# Patient Record
Sex: FEMALE | Race: OTHER | NOT HISPANIC OR LATINO | Employment: FULL TIME | URBAN - METROPOLITAN AREA
[De-identification: names, ages, dates, MRNs, and addresses within clinical notes are randomized per-mention and may not be internally consistent; named-entity substitution may affect disease eponyms.]

---

## 2023-03-03 LAB
ALLERGEN FOOD: ALMOND (AMYGDALUS COMMUNIS) IGE (KU/L): <0.1 KU/L
ALLERGEN FOOD: CASHEW NUT (ANACARDIUM OCCIDENTALE) IGE (KU/L): <0.1 KU/L
ALLERGEN FOOD: HAZELNUT IGE: 1.74 KU/L
ALLERGEN FOOD: PEANUT (ARACHIS HYPOGAEA) IGE (KU/L): <0.1 KU/L
ALLERGEN FOOD: PECAN NUT (CARYA ILLINOENSIS) IGE (KU/L): <0.1 KU/L
ALLERGEN FOOD: PISTACHIO (PISTACIA VERA) IGE (KU/L): <0.1 KU/L
ALLERGEN FOOD: WALNUT (JUGLANS SPP.) IGE (KU/L): <0.1 KU/L
IMMUNOCAP INTERPRETATION: NORMAL

## 2023-03-08 LAB
CLASS ARA H1, VIRC: 0
CLASS ARA H2, VIRC: 0
CLASS ARA H3, VIRC: 0
CLASS ARA H8, VIRC: 2
CLASS ARA H9, VIRC: 0
CLASS HAZELNUT RCORA1, VIRC: 2
CLASS HAZELNUT RCORA14, VIRC: 0
CLASS HAZELNUT RCORA8, VIRC: 0
CLASS HAZELNUT RCORA9, VIRC: 0
HAZELNUT COMP. RCORA1, VIRC: 2.62 KU/L
HAZELNUT COMP. RCORA14, VIRC: <0.1 KU/L
HAZELNUT COMP. RCORA8, VIRC: <0.1 KU/L
HAZELNUT COMP. RCORA9, VIRC: <0.1 KU/L
PEANUT COMP. ARA H1, VIRC: <0.1 KU/L
PEANUT COMP. ARA H2, VIRC: <0.1 KU/L
PEANUT COMP. ARA H3, VIRC: <0.1 KU/L
PEANUT COMP. ARA H8, VIRC: 0.97 KU/L
PEANUT COMP. ARA H9, VIRC: <0.1 KU/L

## 2023-03-17 LAB
ALANINE AMINOTRANSFERASE (SGPT) (U/L) IN SER/PLAS: 17 U/L (ref 7–45)
ALBUMIN (G/DL) IN SER/PLAS: 4.3 G/DL (ref 3.4–5)
ALKALINE PHOSPHATASE (U/L) IN SER/PLAS: 56 U/L (ref 33–110)
ANION GAP IN SER/PLAS: 13 MMOL/L (ref 10–20)
ASPARTATE AMINOTRANSFERASE (SGOT) (U/L) IN SER/PLAS: 15 U/L (ref 9–39)
BILIRUBIN TOTAL (MG/DL) IN SER/PLAS: 0.4 MG/DL (ref 0–1.2)
CALCIUM (MG/DL) IN SER/PLAS: 9.8 MG/DL (ref 8.6–10.6)
CARBON DIOXIDE, TOTAL (MMOL/L) IN SER/PLAS: 28 MMOL/L (ref 21–32)
CHLORIDE (MMOL/L) IN SER/PLAS: 102 MMOL/L (ref 98–107)
CREATININE (MG/DL) IN SER/PLAS: 0.67 MG/DL (ref 0.5–1.05)
ERYTHROCYTE DISTRIBUTION WIDTH (RATIO) BY AUTOMATED COUNT: 14.9 % (ref 11.5–14.5)
ERYTHROCYTE MEAN CORPUSCULAR HEMOGLOBIN CONCENTRATION (G/DL) BY AUTOMATED: 31.8 G/DL (ref 32–36)
ERYTHROCYTE MEAN CORPUSCULAR VOLUME (FL) BY AUTOMATED COUNT: 86 FL (ref 80–100)
ERYTHROCYTES (10*6/UL) IN BLOOD BY AUTOMATED COUNT: 4.73 X10E12/L (ref 4–5.2)
FERRITIN (UG/LL) IN SER/PLAS: 27 UG/L (ref 8–150)
GFR FEMALE: >90 ML/MIN/1.73M2
GLUCOSE (MG/DL) IN SER/PLAS: 107 MG/DL (ref 74–99)
HEMATOCRIT (%) IN BLOOD BY AUTOMATED COUNT: 40.6 % (ref 36–46)
HEMOGLOBIN (G/DL) IN BLOOD: 12.9 G/DL (ref 12–16)
IRON (UG/DL) IN SER/PLAS: 57 UG/DL (ref 35–150)
IRON BINDING CAPACITY (UG/DL) IN SER/PLAS: 385 UG/DL (ref 240–445)
IRON SATURATION (%) IN SER/PLAS: 15 % (ref 25–45)
LEUKOCYTES (10*3/UL) IN BLOOD BY AUTOMATED COUNT: 7.2 X10E9/L (ref 4.4–11.3)
LIPASE (U/L) IN SER/PLAS: 31 U/L (ref 9–82)
NRBC (PER 100 WBCS) BY AUTOMATED COUNT: 0 /100 WBC (ref 0–0)
PLATELETS (10*3/UL) IN BLOOD AUTOMATED COUNT: 341 X10E9/L (ref 150–450)
POTASSIUM (MMOL/L) IN SER/PLAS: 4.2 MMOL/L (ref 3.5–5.3)
PROTEIN TOTAL: 7.1 G/DL (ref 6.4–8.2)
SODIUM (MMOL/L) IN SER/PLAS: 139 MMOL/L (ref 136–145)
UREA NITROGEN (MG/DL) IN SER/PLAS: 13 MG/DL (ref 6–23)

## 2023-03-22 PROBLEM — R06.00 DYSPNEA: Status: ACTIVE | Noted: 2023-03-22

## 2023-03-22 PROBLEM — R00.2 PALPITATIONS: Status: ACTIVE | Noted: 2023-03-22

## 2023-03-22 PROBLEM — G47.10 HYPERSOMNOLENCE: Status: ACTIVE | Noted: 2023-03-22

## 2023-03-22 PROBLEM — K58.9 IBS (IRRITABLE BOWEL SYNDROME): Status: ACTIVE | Noted: 2023-03-22

## 2023-03-22 PROBLEM — I82.532 CHRONIC DEEP VEIN THROMBOSIS (DVT) OF POPLITEAL VEIN OF LEFT LOWER EXTREMITY (MULTI): Status: ACTIVE | Noted: 2023-03-22

## 2023-03-22 PROBLEM — Z86.718 HISTORY OF DVT (DEEP VEIN THROMBOSIS): Status: ACTIVE | Noted: 2023-03-22

## 2023-03-22 PROBLEM — I10 HYPERTENSION: Status: ACTIVE | Noted: 2023-03-22

## 2023-03-22 PROBLEM — N73.9 PID (PELVIC INFLAMMATORY DISEASE): Status: ACTIVE | Noted: 2023-03-22

## 2023-03-22 PROBLEM — N94.3 PMS (PREMENSTRUAL SYNDROME): Status: ACTIVE | Noted: 2023-03-22

## 2023-03-22 PROBLEM — E11.9: Status: ACTIVE | Noted: 2023-03-22

## 2023-03-22 PROBLEM — M54.9 UPPER BACK PAIN ON RIGHT SIDE: Status: ACTIVE | Noted: 2023-03-22

## 2023-03-22 PROBLEM — R10.2 PELVIC PAIN IN FEMALE: Status: ACTIVE | Noted: 2023-03-22

## 2023-03-22 PROBLEM — F43.12 NIGHTMARES ASSOCIATED WITH CHRONIC POST-TRAUMATIC STRESS DISORDER: Status: ACTIVE | Noted: 2023-03-22

## 2023-03-22 PROBLEM — E03.9 HYPOTHYROIDISM: Status: ACTIVE | Noted: 2023-03-22

## 2023-03-22 PROBLEM — R79.89 LOW VITAMIN D LEVEL: Status: ACTIVE | Noted: 2023-03-22

## 2023-03-22 PROBLEM — R53.83 FATIGUE: Status: ACTIVE | Noted: 2023-03-22

## 2023-03-22 PROBLEM — F51.5 NIGHTMARES ASSOCIATED WITH CHRONIC POST-TRAUMATIC STRESS DISORDER: Status: ACTIVE | Noted: 2023-03-22

## 2023-03-22 PROBLEM — J30.1 SEASONAL ALLERGIC RHINITIS DUE TO POLLEN: Status: ACTIVE | Noted: 2023-03-22

## 2023-03-22 PROBLEM — R53.81 MALAISE AND FATIGUE: Status: ACTIVE | Noted: 2023-03-22

## 2023-03-22 PROBLEM — E55.9 VITAMIN D INSUFFICIENCY: Status: ACTIVE | Noted: 2023-03-22

## 2023-03-22 PROBLEM — N20.0 NEPHROLITHIASIS: Status: ACTIVE | Noted: 2023-03-22

## 2023-03-22 PROBLEM — F32.A ANXIETY AND DEPRESSION: Status: ACTIVE | Noted: 2023-03-22

## 2023-03-22 PROBLEM — Z91.010 PEANUT ALLERGY: Status: ACTIVE | Noted: 2023-03-22

## 2023-03-22 PROBLEM — N81.9 PROLAPSE OF FEMALE PELVIC ORGANS: Status: ACTIVE | Noted: 2023-03-22

## 2023-03-22 PROBLEM — F41.9 ANXIETY AND DEPRESSION: Status: ACTIVE | Noted: 2023-03-22

## 2023-03-22 PROBLEM — N92.6 IRREGULAR PERIODS: Status: ACTIVE | Noted: 2023-03-22

## 2023-03-22 PROBLEM — D50.9 IRON DEFICIENCY ANEMIA: Status: ACTIVE | Noted: 2023-03-22

## 2023-03-22 PROBLEM — E78.5 DYSLIPIDEMIA: Status: ACTIVE | Noted: 2023-03-22

## 2023-03-22 PROBLEM — R51.9 FACIAL PAIN: Status: ACTIVE | Noted: 2023-03-22

## 2023-03-22 PROBLEM — J30.81 ALLERGIC RHINITIS DUE TO ANIMAL HAIR AND DANDER: Status: ACTIVE | Noted: 2023-03-22

## 2023-03-22 PROBLEM — M79.605 LEFT LEG PAIN: Status: ACTIVE | Noted: 2023-03-22

## 2023-03-22 PROBLEM — R31.29 OTHER MICROSCOPIC HEMATURIA: Status: ACTIVE | Noted: 2023-03-22

## 2023-03-22 PROBLEM — E11.9 TYPE 2 DIABETES MELLITUS WITHOUT COMPLICATIONS (MULTI): Status: ACTIVE | Noted: 2023-03-22

## 2023-03-22 PROBLEM — R06.02 SHORTNESS OF BREATH: Status: ACTIVE | Noted: 2023-03-22

## 2023-03-22 PROBLEM — N83.02 FOLLICULAR CYST OF LEFT OVARY: Status: ACTIVE | Noted: 2023-03-22

## 2023-03-22 PROBLEM — N93.9 ABNORMAL UTERINE BLEEDING (AUB): Status: ACTIVE | Noted: 2023-03-22

## 2023-03-22 PROBLEM — I26.99 PULMONARY EMBOLUS (MULTI): Status: ACTIVE | Noted: 2023-03-22

## 2023-03-22 PROBLEM — J30.9 ALLERGIC RHINITIS: Status: ACTIVE | Noted: 2023-03-22

## 2023-03-22 PROBLEM — Z91.018 TREE NUT ALLERGY: Status: ACTIVE | Noted: 2023-03-22

## 2023-03-22 PROBLEM — R44.8 FACIAL PRESSURE: Status: ACTIVE | Noted: 2023-03-22

## 2023-03-22 PROBLEM — N92.0 MENORRHAGIA WITH REGULAR CYCLE: Status: ACTIVE | Noted: 2023-03-22

## 2023-03-22 PROBLEM — R07.89 ATYPICAL CHEST PAIN: Status: ACTIVE | Noted: 2023-03-22

## 2023-03-22 PROBLEM — M62.89 PELVIC FLOOR DYSFUNCTION: Status: ACTIVE | Noted: 2023-03-22

## 2023-03-22 PROBLEM — R52 PAIN OF RIGHT SIDE OF BODY: Status: ACTIVE | Noted: 2023-03-22

## 2023-03-22 PROBLEM — R10.9 ABDOMINAL PAIN: Status: ACTIVE | Noted: 2023-03-22

## 2023-03-22 PROBLEM — G43.809 VESTIBULAR MIGRAINE: Status: ACTIVE | Noted: 2023-03-22

## 2023-03-22 PROBLEM — R53.83 MALAISE AND FATIGUE: Status: ACTIVE | Noted: 2023-03-22

## 2023-03-22 RX ORDER — GUAIFENESIN 1200 MG
650 TABLET, EXTENDED RELEASE 12 HR ORAL 3 TIMES DAILY PRN
COMMUNITY
Start: 2020-11-20 | End: 2023-11-06 | Stop reason: ALTCHOICE

## 2023-03-22 RX ORDER — AZELASTINE 1 MG/ML
2 SPRAY, METERED NASAL 2 TIMES DAILY
COMMUNITY
Start: 2023-03-15 | End: 2023-03-23 | Stop reason: ALTCHOICE

## 2023-03-22 RX ORDER — FLUTICASONE PROPIONATE 50 MCG
2 SPRAY, SUSPENSION (ML) NASAL DAILY
COMMUNITY
Start: 2023-03-02 | End: 2023-03-23 | Stop reason: ALTCHOICE

## 2023-03-22 RX ORDER — CHOLECALCIFEROL (VITAMIN D3) 50 MCG
1 TABLET ORAL DAILY
COMMUNITY
Start: 2020-11-30 | End: 2023-10-13

## 2023-03-22 RX ORDER — LEVOTHYROXINE SODIUM 112 UG/1
1 TABLET ORAL DAILY
COMMUNITY
Start: 2015-07-07

## 2023-03-22 RX ORDER — EPINEPHRINE 0.3 MG/.3ML
1 INJECTION SUBCUTANEOUS ONCE
COMMUNITY
Start: 2023-03-02

## 2023-03-23 ENCOUNTER — OFFICE VISIT (OUTPATIENT)
Dept: PRIMARY CARE | Facility: CLINIC | Age: 43
End: 2023-03-23
Payer: COMMERCIAL

## 2023-03-23 VITALS
TEMPERATURE: 95.4 F | DIASTOLIC BLOOD PRESSURE: 90 MMHG | WEIGHT: 195 LBS | SYSTOLIC BLOOD PRESSURE: 130 MMHG | BODY MASS INDEX: 34.55 KG/M2 | HEIGHT: 63 IN

## 2023-03-23 DIAGNOSIS — S06.0X0D CONCUSSION WITHOUT LOSS OF CONSCIOUSNESS, SUBSEQUENT ENCOUNTER: ICD-10-CM

## 2023-03-23 DIAGNOSIS — E66.9 OBESITY (BMI 30.0-34.9): Primary | ICD-10-CM

## 2023-03-23 DIAGNOSIS — E11.9 TYPE 2 DIABETES MELLITUS IN REMISSION (MULTI): ICD-10-CM

## 2023-03-23 PROBLEM — R10.9 ABDOMINAL PAIN: Status: RESOLVED | Noted: 2023-03-22 | Resolved: 2023-03-23

## 2023-03-23 PROBLEM — E66.811 OBESITY (BMI 30.0-34.9): Status: ACTIVE | Noted: 2023-03-23

## 2023-03-23 PROBLEM — R51.9 FACIAL PAIN: Status: RESOLVED | Noted: 2023-03-22 | Resolved: 2023-03-23

## 2023-03-23 PROCEDURE — 3080F DIAST BP >= 90 MM HG: CPT

## 2023-03-23 PROCEDURE — 3075F SYST BP GE 130 - 139MM HG: CPT

## 2023-03-23 PROCEDURE — 1036F TOBACCO NON-USER: CPT

## 2023-03-23 PROCEDURE — 99213 OFFICE O/P EST LOW 20 MIN: CPT

## 2023-03-23 RX ORDER — MINERAL OIL
180 ENEMA (ML) RECTAL DAILY
COMMUNITY
Start: 2023-03-02 | End: 2023-03-23 | Stop reason: ALTCHOICE

## 2023-03-23 RX ORDER — FERROUS GLUCONATE 240(27)MG
27 TABLET ORAL
COMMUNITY
Start: 2023-01-04 | End: 2023-10-04 | Stop reason: ALTCHOICE

## 2023-03-23 RX ORDER — ALBUTEROL SULFATE 90 UG/1
2 AEROSOL, METERED RESPIRATORY (INHALATION) EVERY 4 HOURS PRN
COMMUNITY
Start: 2022-07-26 | End: 2023-03-23 | Stop reason: ALTCHOICE

## 2023-03-23 RX ORDER — FLUOXETINE HYDROCHLORIDE 20 MG/1
20 CAPSULE ORAL DAILY
COMMUNITY
Start: 2023-01-18 | End: 2023-03-23 | Stop reason: ALTCHOICE

## 2023-03-23 RX ORDER — GABAPENTIN 100 MG/1
100 CAPSULE ORAL NIGHTLY
COMMUNITY
Start: 2023-02-17 | End: 2023-03-23
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
## 2023-03-24 ENCOUNTER — APPOINTMENT (OUTPATIENT)
Dept: PRIMARY CARE | Facility: CLINIC | Age: 43
End: 2023-03-24
Payer: COMMERCIAL

## 2023-05-15 LAB
CLUE CELLS: NORMAL
NUGENT SCORE: 1
YEAST: NORMAL

## 2023-05-16 ENCOUNTER — OFFICE VISIT (OUTPATIENT)
Dept: PRIMARY CARE | Facility: CLINIC | Age: 43
End: 2023-05-16
Payer: COMMERCIAL

## 2023-05-16 VITALS
BODY MASS INDEX: 34.91 KG/M2 | DIASTOLIC BLOOD PRESSURE: 80 MMHG | SYSTOLIC BLOOD PRESSURE: 130 MMHG | HEIGHT: 63 IN | WEIGHT: 197 LBS

## 2023-05-16 DIAGNOSIS — M77.52 LEFT ANKLE TENDINITIS: Primary | ICD-10-CM

## 2023-05-16 PROBLEM — J30.1 SEASONAL ALLERGIC RHINITIS DUE TO POLLEN: Status: RESOLVED | Noted: 2023-03-22 | Resolved: 2023-05-16

## 2023-05-16 PROBLEM — R44.8 FACIAL PRESSURE: Status: RESOLVED | Noted: 2023-03-22 | Resolved: 2023-05-16

## 2023-05-16 PROBLEM — R10.2 PELVIC PAIN IN FEMALE: Status: RESOLVED | Noted: 2023-03-22 | Resolved: 2023-05-16

## 2023-05-16 PROBLEM — R31.29 OTHER MICROSCOPIC HEMATURIA: Status: RESOLVED | Noted: 2023-03-22 | Resolved: 2023-05-16

## 2023-05-16 PROBLEM — R53.83 FATIGUE: Status: RESOLVED | Noted: 2023-03-22 | Resolved: 2023-05-16

## 2023-05-16 PROBLEM — M79.605 LEFT LEG PAIN: Status: RESOLVED | Noted: 2023-03-22 | Resolved: 2023-05-16

## 2023-05-16 PROBLEM — N20.0 NEPHROLITHIASIS: Status: RESOLVED | Noted: 2023-03-22 | Resolved: 2023-05-16

## 2023-05-16 PROBLEM — N94.3 PMS (PREMENSTRUAL SYNDROME): Status: RESOLVED | Noted: 2023-03-22 | Resolved: 2023-05-16

## 2023-05-16 PROBLEM — R52 PAIN OF RIGHT SIDE OF BODY: Status: RESOLVED | Noted: 2023-03-22 | Resolved: 2023-05-16

## 2023-05-16 PROBLEM — N81.9 PROLAPSE OF FEMALE PELVIC ORGANS: Status: RESOLVED | Noted: 2023-03-22 | Resolved: 2023-05-16

## 2023-05-16 PROBLEM — M62.89 PELVIC FLOOR DYSFUNCTION: Status: RESOLVED | Noted: 2023-03-22 | Resolved: 2023-05-16

## 2023-05-16 PROBLEM — R06.00 DYSPNEA: Status: RESOLVED | Noted: 2023-03-22 | Resolved: 2023-05-16

## 2023-05-16 PROBLEM — I26.99 PULMONARY EMBOLUS (MULTI): Status: RESOLVED | Noted: 2023-03-22 | Resolved: 2023-05-16

## 2023-05-16 PROBLEM — N93.9 ABNORMAL UTERINE BLEEDING (AUB): Status: RESOLVED | Noted: 2023-03-22 | Resolved: 2023-05-16

## 2023-05-16 PROBLEM — R00.2 PALPITATIONS: Status: RESOLVED | Noted: 2023-03-22 | Resolved: 2023-05-16

## 2023-05-16 PROBLEM — R53.83 MALAISE AND FATIGUE: Status: RESOLVED | Noted: 2023-03-22 | Resolved: 2023-05-16

## 2023-05-16 PROBLEM — N92.0 MENORRHAGIA WITH REGULAR CYCLE: Status: RESOLVED | Noted: 2023-03-22 | Resolved: 2023-05-16

## 2023-05-16 PROBLEM — M54.9 UPPER BACK PAIN ON RIGHT SIDE: Status: RESOLVED | Noted: 2023-03-22 | Resolved: 2023-05-16

## 2023-05-16 PROBLEM — R53.81 MALAISE AND FATIGUE: Status: RESOLVED | Noted: 2023-03-22 | Resolved: 2023-05-16

## 2023-05-16 PROBLEM — J30.81 ALLERGIC RHINITIS DUE TO ANIMAL HAIR AND DANDER: Status: RESOLVED | Noted: 2023-03-22 | Resolved: 2023-05-16

## 2023-05-16 PROBLEM — Z91.010 PEANUT ALLERGY: Status: RESOLVED | Noted: 2023-03-22 | Resolved: 2023-05-16

## 2023-05-16 PROBLEM — R06.02 SHORTNESS OF BREATH: Status: RESOLVED | Noted: 2023-03-22 | Resolved: 2023-05-16

## 2023-05-16 PROBLEM — N92.6 IRREGULAR PERIODS: Status: RESOLVED | Noted: 2023-03-22 | Resolved: 2023-05-16

## 2023-05-16 PROBLEM — N73.9 PID (PELVIC INFLAMMATORY DISEASE): Status: RESOLVED | Noted: 2023-03-22 | Resolved: 2023-05-16

## 2023-05-16 PROBLEM — R07.89 ATYPICAL CHEST PAIN: Status: RESOLVED | Noted: 2023-03-22 | Resolved: 2023-05-16

## 2023-05-16 LAB — URINE CULTURE: NO GROWTH

## 2023-05-16 PROCEDURE — 3079F DIAST BP 80-89 MM HG: CPT

## 2023-05-16 PROCEDURE — 3075F SYST BP GE 130 - 139MM HG: CPT

## 2023-05-16 PROCEDURE — 99213 OFFICE O/P EST LOW 20 MIN: CPT

## 2023-05-16 PROCEDURE — 1036F TOBACCO NON-USER: CPT

## 2023-05-16 RX ORDER — NAPROXEN 500 MG/1
500 TABLET ORAL 2 TIMES DAILY PRN
Qty: 30 TABLET | Refills: 0 | Status: SHIPPED | OUTPATIENT
Start: 2023-05-16 | End: 2023-08-14

## 2023-05-16 RX ORDER — PAROXETINE 10 MG/1
10 TABLET, FILM COATED ORAL EVERY MORNING
COMMUNITY
Start: 2023-05-15 | End: 2023-08-23

## 2023-05-16 ASSESSMENT — PATIENT HEALTH QUESTIONNAIRE - PHQ9
1. LITTLE INTEREST OR PLEASURE IN DOING THINGS: SEVERAL DAYS
2. FEELING DOWN, DEPRESSED OR HOPELESS: SEVERAL DAYS
SUM OF ALL RESPONSES TO PHQ9 QUESTIONS 1 & 2: 2
1. LITTLE INTEREST OR PLEASURE IN DOING THINGS: SEVERAL DAYS
2. FEELING DOWN, DEPRESSED OR HOPELESS: SEVERAL DAYS
SUM OF ALL RESPONSES TO PHQ9 QUESTIONS 1 & 2: 2

## 2023-05-16 ASSESSMENT — LIFESTYLE VARIABLES
SKIP TO QUESTIONS 9-10: 1
HOW OFTEN DO YOU HAVE A DRINK CONTAINING ALCOHOL: NEVER
HOW MANY STANDARD DRINKS CONTAINING ALCOHOL DO YOU HAVE ON A TYPICAL DAY: PATIENT DOES NOT DRINK
HOW OFTEN DO YOU HAVE SIX OR MORE DRINKS ON ONE OCCASION: NEVER
AUDIT-C TOTAL SCORE: 0

## 2023-05-16 NOTE — PROGRESS NOTES
"Subjective   Patient ID: Tsering Carranza is a 42 y.o. female who presents for L anlkle pain.  HPI  40 yo female Pmhx prior DVT, Hypothyroidism (sees endo), COVID x 2, Vestibular migraine, Pre-DM/DM, Hypothyroidism, Vitamin D Deficiency and Iron Deficiency, Nephrolithiasis presents for ankle pain and cramping.     Left ankle pain x1 week. Initially started hurting, then improved, then worsened. No known injury. Did start walking for exercise just prior to pain starting, has been walking in shoes that she feels are old and not as supportive as they could be. Describes it as a 3/10 constant aching on the medial aspect of ankle. Worse with sitting and resting. No warmth, swelling, or erythema. Does have pain to palpation over tibialis anterior.   Has not taken any medication for it.     Hypothyroidism: 112mcg levothyroxine  MERYL: Iron supplement daily    All systems have been reviewed and are negative for complaint other than those mentioned in the HPI.     Objective   /80 (BP Location: Left arm, Patient Position: Sitting, BP Cuff Size: Large adult)   Ht 1.6 m (5' 3\")   Wt 89.4 kg (197 lb)   BMI 34.90 kg/m²    Physical Exam  Constitutional:       General: She is awake.      Appearance: Normal appearance.   HENT:      Head: Normocephalic and atraumatic.   Eyes:      Extraocular Movements: Extraocular movements intact.      Pupils: Pupils are equal, round, and reactive to light.   Cardiovascular:      Rate and Rhythm: Normal rate and regular rhythm.      Heart sounds: S1 normal and S2 normal. No murmur heard.  Pulmonary:      Effort: Pulmonary effort is normal.      Breath sounds: Normal breath sounds.   Musculoskeletal:      Cervical back: Normal range of motion and neck supple.      Right lower leg: No edema.      Left lower leg: No edema.      Right ankle: No swelling, deformity or ecchymosis. No tenderness. Normal range of motion. Normal pulse.      Left ankle: No swelling, deformity or ecchymosis. " Tenderness present over the posterior TF ligament. Normal range of motion. Normal pulse.      Right foot: Normal.      Left foot: Normal.   Skin:     General: Skin is warm and dry.   Neurological:      General: No focal deficit present.      Mental Status: She is alert and oriented to person, place, and time.   Psychiatric:         Mood and Affect: Mood and affect normal.         Behavior: Behavior normal. Behavior is cooperative.         Thought Content: Thought content normal.         Judgment: Judgment normal.     Tsering was seen today for l anlkle pain.  Diagnoses and all orders for this visit:  Left ankle tendinitis (Primary)  -     Patient recently began walking for exercise in shoes that she states are old and not supporting  - Tender to palpation over posterior TFL, likely tendonitis  - Rest, NSAID, compression, ice recommended to patient. Recommend using supportive shoes when she resumes exercise and starting an exercise routine slowly.   - naproxen (Naprosyn) 500 mg tablet; Take 1 tablet (500 mg) by mouth 2 times a day as needed for mild pain (1 - 3) (pain).  - Patient does have remote history of provoked DVT (post surgery and while pregnant), no longer on anticoagulation, but pulses are intact, no discoloration, no swelling or temperature changes. No recent long periods of immobility or prolonged sitting.     Follow up in 2 weeks if symptoms are not improved.

## 2023-07-03 LAB
ALANINE AMINOTRANSFERASE (SGPT) (U/L) IN SER/PLAS: 15 U/L (ref 7–45)
ALBUMIN (G/DL) IN SER/PLAS: 4.2 G/DL (ref 3.4–5)
ALBUMIN (MG/L) IN URINE: <7 MG/L
ALBUMIN/CREATININE (UG/MG) IN URINE: NORMAL UG/MG CRT (ref 0–30)
ALKALINE PHOSPHATASE (U/L) IN SER/PLAS: 60 U/L (ref 33–110)
ANION GAP IN SER/PLAS: 10 MMOL/L (ref 10–20)
ASPARTATE AMINOTRANSFERASE (SGOT) (U/L) IN SER/PLAS: 14 U/L (ref 9–39)
BASOPHILS (10*3/UL) IN BLOOD BY AUTOMATED COUNT: 0.05 X10E9/L (ref 0–0.1)
BASOPHILS/100 LEUKOCYTES IN BLOOD BY AUTOMATED COUNT: 0.8 % (ref 0–2)
BILIRUBIN TOTAL (MG/DL) IN SER/PLAS: 0.3 MG/DL (ref 0–1.2)
CALCIDIOL (25 OH VITAMIN D3) (NG/ML) IN SER/PLAS: 26 NG/ML
CALCIUM (MG/DL) IN SER/PLAS: 9.3 MG/DL (ref 8.6–10.6)
CARBON DIOXIDE, TOTAL (MMOL/L) IN SER/PLAS: 28 MMOL/L (ref 21–32)
CHLORIDE (MMOL/L) IN SER/PLAS: 106 MMOL/L (ref 98–107)
CHOLESTEROL (MG/DL) IN SER/PLAS: 200 MG/DL (ref 0–199)
CHOLESTEROL IN HDL (MG/DL) IN SER/PLAS: 57.7 MG/DL
CHOLESTEROL/HDL RATIO: 3.5
CORTISOL (UG/DL) IN SERUM - AM: 22 UG/DL (ref 5–20)
CREATININE (MG/DL) IN SER/PLAS: 0.75 MG/DL (ref 0.5–1.05)
CREATININE (MG/DL) IN URINE: 145 MG/DL (ref 20–320)
EOSINOPHILS (10*3/UL) IN BLOOD BY AUTOMATED COUNT: 0.12 X10E9/L (ref 0–0.7)
EOSINOPHILS/100 LEUKOCYTES IN BLOOD BY AUTOMATED COUNT: 1.9 % (ref 0–6)
ERYTHROCYTE DISTRIBUTION WIDTH (RATIO) BY AUTOMATED COUNT: 13.1 % (ref 11.5–14.5)
ERYTHROCYTE MEAN CORPUSCULAR HEMOGLOBIN CONCENTRATION (G/DL) BY AUTOMATED: 31.7 G/DL (ref 32–36)
ERYTHROCYTE MEAN CORPUSCULAR VOLUME (FL) BY AUTOMATED COUNT: 90 FL (ref 80–100)
ERYTHROCYTES (10*6/UL) IN BLOOD BY AUTOMATED COUNT: 4.49 X10E12/L (ref 4–5.2)
ESTIMATED AVERAGE GLUCOSE FOR HBA1C: 108 MG/DL
FERRITIN (UG/LL) IN SER/PLAS: 26 UG/L (ref 8–150)
GFR FEMALE: >90 ML/MIN/1.73M2
GLUCOSE (MG/DL) IN SER/PLAS: 106 MG/DL (ref 74–99)
HEMATOCRIT (%) IN BLOOD BY AUTOMATED COUNT: 40.4 % (ref 36–46)
HEMOGLOBIN (G/DL) IN BLOOD: 12.8 G/DL (ref 12–16)
HEMOGLOBIN A1C/HEMOGLOBIN TOTAL IN BLOOD: 5.4 %
IMMATURE GRANULOCYTES/100 LEUKOCYTES IN BLOOD BY AUTOMATED COUNT: 0.2 % (ref 0–0.9)
INSULIN: 16 UIU/ML (ref 3–25)
IRON (UG/DL) IN SER/PLAS: 62 UG/DL (ref 35–150)
IRON BINDING CAPACITY (UG/DL) IN SER/PLAS: 388 UG/DL (ref 240–445)
IRON SATURATION (%) IN SER/PLAS: 16 % (ref 25–45)
LDL: 125 MG/DL (ref 0–99)
LEUKOCYTES (10*3/UL) IN BLOOD BY AUTOMATED COUNT: 6.5 X10E9/L (ref 4.4–11.3)
LYMPHOCYTES (10*3/UL) IN BLOOD BY AUTOMATED COUNT: 2.65 X10E9/L (ref 1.2–4.8)
LYMPHOCYTES/100 LEUKOCYTES IN BLOOD BY AUTOMATED COUNT: 40.9 % (ref 13–44)
MONOCYTES (10*3/UL) IN BLOOD BY AUTOMATED COUNT: 0.32 X10E9/L (ref 0.1–1)
MONOCYTES/100 LEUKOCYTES IN BLOOD BY AUTOMATED COUNT: 4.9 % (ref 2–10)
NEUTROPHILS (10*3/UL) IN BLOOD BY AUTOMATED COUNT: 3.33 X10E9/L (ref 1.2–7.7)
NEUTROPHILS/100 LEUKOCYTES IN BLOOD BY AUTOMATED COUNT: 51.3 % (ref 40–80)
NRBC (PER 100 WBCS) BY AUTOMATED COUNT: 0 /100 WBC (ref 0–0)
PLATELETS (10*3/UL) IN BLOOD AUTOMATED COUNT: 342 X10E9/L (ref 150–450)
POTASSIUM (MMOL/L) IN SER/PLAS: 4.6 MMOL/L (ref 3.5–5.3)
PROTEIN TOTAL: 6.9 G/DL (ref 6.4–8.2)
SODIUM (MMOL/L) IN SER/PLAS: 139 MMOL/L (ref 136–145)
THYROTROPIN (MIU/L) IN SER/PLAS BY DETECTION LIMIT <= 0.05 MIU/L: 2.58 MIU/L (ref 0.44–3.98)
TRIGLYCERIDE (MG/DL) IN SER/PLAS: 89 MG/DL (ref 0–149)
UREA NITROGEN (MG/DL) IN SER/PLAS: 13 MG/DL (ref 6–23)
VLDL: 18 MG/DL (ref 0–40)

## 2023-07-05 LAB — PROTEIN S FREE AG IN PPP BY IMMUNOLOGIC METHOD: 80 % (ref 55–124)

## 2023-07-06 LAB — FRUCTOSAMINE SER/PLAS: 233 UMOL/L (ref 205–285)

## 2023-07-07 LAB — ADRENOCORTICOTROPIC HORMONE: 38.4 PG/ML (ref 7.2–63.3)

## 2023-07-11 LAB
CLUE CELLS: NORMAL
NUGENT SCORE: 2
URINE CULTURE: NO GROWTH
YEAST: NORMAL

## 2023-08-23 ENCOUNTER — OFFICE VISIT (OUTPATIENT)
Dept: PRIMARY CARE | Facility: CLINIC | Age: 43
End: 2023-08-23
Payer: COMMERCIAL

## 2023-08-23 VITALS
HEIGHT: 63 IN | BODY MASS INDEX: 36.5 KG/M2 | WEIGHT: 206 LBS | DIASTOLIC BLOOD PRESSURE: 85 MMHG | SYSTOLIC BLOOD PRESSURE: 155 MMHG

## 2023-08-23 DIAGNOSIS — I10 PRIMARY HYPERTENSION: Primary | ICD-10-CM

## 2023-08-23 DIAGNOSIS — R11.0 NAUSEA: ICD-10-CM

## 2023-08-23 DIAGNOSIS — F41.9 ANXIETY AND DEPRESSION: ICD-10-CM

## 2023-08-23 DIAGNOSIS — F32.A ANXIETY AND DEPRESSION: ICD-10-CM

## 2023-08-23 DIAGNOSIS — F07.81 POST-CONCUSSION SYNDROME: ICD-10-CM

## 2023-08-23 PROBLEM — F41.1 ANXIETY STATE: Status: RESOLVED | Noted: 2020-11-05 | Resolved: 2023-08-23

## 2023-08-23 PROBLEM — E78.2 MIXED HYPERLIPIDEMIA: Status: ACTIVE | Noted: 2020-11-05

## 2023-08-23 PROBLEM — F41.1 ANXIETY STATE: Status: ACTIVE | Noted: 2020-11-05

## 2023-08-23 PROBLEM — E78.5 DYSLIPIDEMIA: Status: RESOLVED | Noted: 2023-03-22 | Resolved: 2023-08-23

## 2023-08-23 LAB
APPEARANCE, URINE: NORMAL
BILIRUBIN, URINE: NEGATIVE
BLOOD, URINE: NEGATIVE
COLOR, URINE: YELLOW
GLUCOSE, URINE: NEGATIVE MG/DL
KETONES, URINE: NEGATIVE MG/DL
LEUKOCYTE ESTERASE, URINE: NEGATIVE
NITRITE, URINE: NEGATIVE
PH, URINE: 7 (ref 5–8)
PROTEIN, URINE: NEGATIVE MG/DL
SPECIFIC GRAVITY, URINE: 1.02 (ref 1–1.03)
UROBILINOGEN, URINE: <2 MG/DL (ref 0–1.9)

## 2023-08-23 PROCEDURE — 99214 OFFICE O/P EST MOD 30 MIN: CPT

## 2023-08-23 PROCEDURE — 3044F HG A1C LEVEL LT 7.0%: CPT

## 2023-08-23 PROCEDURE — 3008F BODY MASS INDEX DOCD: CPT

## 2023-08-23 PROCEDURE — 3079F DIAST BP 80-89 MM HG: CPT

## 2023-08-23 PROCEDURE — 1036F TOBACCO NON-USER: CPT

## 2023-08-23 PROCEDURE — 3077F SYST BP >= 140 MM HG: CPT

## 2023-08-23 RX ORDER — ONDANSETRON 4 MG/1
4 TABLET, ORALLY DISINTEGRATING ORAL EVERY 8 HOURS PRN
Qty: 20 TABLET | Refills: 0 | Status: SHIPPED | OUTPATIENT
Start: 2023-08-23 | End: 2023-08-30

## 2023-08-23 RX ORDER — FLUOXETINE 10 MG/1
10 CAPSULE ORAL DAILY
COMMUNITY
End: 2023-10-04

## 2023-08-23 ASSESSMENT — LIFESTYLE VARIABLES
HOW OFTEN DO YOU HAVE A DRINK CONTAINING ALCOHOL: NEVER
SKIP TO QUESTIONS 9-10: 1
HOW OFTEN DO YOU HAVE SIX OR MORE DRINKS ON ONE OCCASION: NEVER
HOW MANY STANDARD DRINKS CONTAINING ALCOHOL DO YOU HAVE ON A TYPICAL DAY: PATIENT DOES NOT DRINK
AUDIT-C TOTAL SCORE: 0

## 2023-08-23 ASSESSMENT — PATIENT HEALTH QUESTIONNAIRE - PHQ9
SUM OF ALL RESPONSES TO PHQ9 QUESTIONS 1 AND 2: 0
2. FEELING DOWN, DEPRESSED OR HOPELESS: NOT AT ALL
1. LITTLE INTEREST OR PLEASURE IN DOING THINGS: NOT AT ALL
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
## 2023-08-24 LAB — URINE CULTURE: NO GROWTH

## 2023-09-01 ENCOUNTER — OFFICE VISIT (OUTPATIENT)
Dept: PRIMARY CARE | Facility: CLINIC | Age: 43
End: 2023-09-01
Payer: COMMERCIAL

## 2023-09-01 ENCOUNTER — APPOINTMENT (OUTPATIENT)
Dept: PRIMARY CARE | Facility: CLINIC | Age: 43
End: 2023-09-01
Payer: COMMERCIAL

## 2023-09-01 VITALS
SYSTOLIC BLOOD PRESSURE: 148 MMHG | WEIGHT: 205 LBS | BODY MASS INDEX: 36.32 KG/M2 | DIASTOLIC BLOOD PRESSURE: 99 MMHG | HEIGHT: 63 IN

## 2023-09-01 DIAGNOSIS — R10.2 PELVIC PAIN: Primary | ICD-10-CM

## 2023-09-01 DIAGNOSIS — I10 PRIMARY HYPERTENSION: ICD-10-CM

## 2023-09-01 PROCEDURE — 3008F BODY MASS INDEX DOCD: CPT

## 2023-09-01 PROCEDURE — 99213 OFFICE O/P EST LOW 20 MIN: CPT

## 2023-09-01 PROCEDURE — 3080F DIAST BP >= 90 MM HG: CPT

## 2023-09-01 PROCEDURE — 3044F HG A1C LEVEL LT 7.0%: CPT

## 2023-09-01 PROCEDURE — 3077F SYST BP >= 140 MM HG: CPT

## 2023-09-01 PROCEDURE — 1036F TOBACCO NON-USER: CPT

## 2023-09-01 RX ORDER — NAPROXEN 500 MG/1
500 TABLET ORAL 2 TIMES DAILY PRN
Qty: 60 TABLET | Refills: 0 | Status: SHIPPED | OUTPATIENT
Start: 2023-09-01 | End: 2023-10-04

## 2023-09-01 ASSESSMENT — PATIENT HEALTH QUESTIONNAIRE - PHQ9
10. IF YOU CHECKED OFF ANY PROBLEMS, HOW DIFFICULT HAVE THESE PROBLEMS MADE IT FOR YOU TO DO YOUR WORK, TAKE CARE OF THINGS AT HOME, OR GET ALONG WITH OTHER PEOPLE: NOT DIFFICULT AT ALL
1. LITTLE INTEREST OR PLEASURE IN DOING THINGS: NOT AT ALL
SUM OF ALL RESPONSES TO PHQ9 QUESTIONS 1 AND 2: 1
2. FEELING DOWN, DEPRESSED OR HOPELESS: SEVERAL DAYS
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
## 2023-09-30 DIAGNOSIS — R79.89 LOW VITAMIN D LEVEL: Primary | ICD-10-CM

## 2023-10-02 PROBLEM — S06.0X0D CONCUSSION WITH NO LOSS OF CONSCIOUSNESS, SUBSEQUENT ENCOUNTER: Status: ACTIVE | Noted: 2023-10-02

## 2023-10-02 PROBLEM — M54.2 CERVICALGIA: Status: ACTIVE | Noted: 2023-10-02

## 2023-10-02 PROBLEM — R42 DIZZINESS: Status: ACTIVE | Noted: 2023-10-02

## 2023-10-02 ASSESSMENT — ENCOUNTER SYMPTOMS
OCCASIONAL FEELINGS OF UNSTEADINESS: 0
DEPRESSION: 0
LOSS OF SENSATION IN FEET: 0

## 2023-10-02 NOTE — PROGRESS NOTES
Physical Therapy      Physical Therapy Treatment    Patient Name: Tsering Carranza  MRN: [id number]  Today's Date: 10/3/23  Visit: 3/8  Auth date: 9/7/23-11/26/23    *NOTE: Documentation may appear different than previous visits. [facility] of [facility] prior to transition to [facility]. Any omitted documentation may be located within previous documentation within [employer] [facility] system.    SUBJECTIVE:  Patient reports had a lot of head pressure and brain fog.  Better today somewhat.  Has been able to walk 15 min which increased sx somewhat.  Feels may be due to has to [place] the park.    OBJECTIVE:  PCS pre= 75    TREATMENT:  Therex:  Rosendo Treadmill Training:  -able to walk up to 1.9 mph  -stopped at 13 min, no increased sx    Manual Therapy:  Passive cervical stretching in SB, rotation and suboccipital release and manual work to UTs.      ASSESSMENT:  Tighter in R vs L UT.  Discussed gradual progression of exercises  PLAN:    Continue to address neck pain, activity tolerance.

## 2023-10-03 ENCOUNTER — TREATMENT (OUTPATIENT)
Dept: PHYSICAL THERAPY | Facility: CLINIC | Age: 43
End: 2023-10-03
Payer: COMMERCIAL

## 2023-10-03 DIAGNOSIS — S06.0X0D CONCUSSION WITH NO LOSS OF CONSCIOUSNESS, SUBSEQUENT ENCOUNTER: ICD-10-CM

## 2023-10-03 DIAGNOSIS — M54.2 CERVICALGIA: Primary | ICD-10-CM

## 2023-10-03 DIAGNOSIS — R39.9 UNSPECIFIED SYMPTOMS AND SIGNS INVOLVING THE GENITOURINARY SYSTEM: ICD-10-CM

## 2023-10-03 DIAGNOSIS — R42 DIZZINESS: ICD-10-CM

## 2023-10-03 PROCEDURE — 97140 MANUAL THERAPY 1/> REGIONS: CPT | Mod: 59,GP

## 2023-10-03 PROCEDURE — 97110 THERAPEUTIC EXERCISES: CPT | Mod: GP

## 2023-10-03 PROCEDURE — 97140 MANUAL THERAPY 1/> REGIONS: CPT | Mod: GP

## 2023-10-03 NOTE — PROGRESS NOTES
Physical Therapy/Discharge    PELVIC FLOOR    Name: Tsering Carranza  MRN: [id number]  : 1980  Today's Date: 10/03/23          Assessment:   Anticipate patient's presenting problem is not from pelvic floor etiology. Pt has no appreciable trigger points either vaginally or abdominally. Pain levels are reducing on their own.      Plan:   All patient's questions were answered and will discharge and follow up if needed.          Interventions:   Supine and figure 4 vaginal release  Supine and figure 4 abdominal and visceral mobilization     Current Problem:  1. Unspecified symptoms and signs involving the genitourinary system  Referral to Physical Therapy        Goals:  Pt will be independent in HEP to maximize PT POC - MET    Pt will improve NIH CPSI by >50% raw score     Pt will reduce pain levels to <1/10 at worst to indicate significant reduction in pain - MET     Pt will be able to resume all activities of daily living without pain     Pt will ultimately be able to tolerate medical exams and/or intercourse appropriately without discomfort - MET     Subjective   General  Pain has been getting better. Rated 1/10. She felt some pain in her leg and knee after vaginal exam. Pain and pressure in lower groin more on right than left.        Remedios Yeh, PT

## 2023-10-04 ENCOUNTER — OFFICE VISIT (OUTPATIENT)
Dept: PRIMARY CARE | Facility: CLINIC | Age: 43
End: 2023-10-04
Payer: COMMERCIAL

## 2023-10-04 VITALS — WEIGHT: 209.8 LBS | BODY MASS INDEX: 37.17 KG/M2 | HEIGHT: 63 IN

## 2023-10-04 DIAGNOSIS — S06.0X0D CONCUSSION WITH NO LOSS OF CONSCIOUSNESS, SUBSEQUENT ENCOUNTER: Primary | ICD-10-CM

## 2023-10-04 PROCEDURE — 1036F TOBACCO NON-USER: CPT | Performed by: FAMILY MEDICINE

## 2023-10-04 PROCEDURE — 3044F HG A1C LEVEL LT 7.0%: CPT | Performed by: FAMILY MEDICINE

## 2023-10-04 PROCEDURE — 3008F BODY MASS INDEX DOCD: CPT | Performed by: FAMILY MEDICINE

## 2023-10-04 PROCEDURE — 99214 OFFICE O/P EST MOD 30 MIN: CPT | Performed by: FAMILY MEDICINE

## 2023-10-04 NOTE — LETTER
October 4, 2023     Patient: Tsering Carranza   YOB: 1980   Date of Visit: 10/4/2023       To Whom It May Concern:    Tsering Cararnza was seen in my clinic on 10/4/2023 at 9:30 am. Please excuse Tsering for her absence from work on this day to make the appointment.  I recommend 2 days per week, 1 hour per day. She will follow up in 3-4 week.     If you have any questions or concerns, please don't hesitate to call.         Sincerely,         Fernando Carolina, DO        CC: No Recipients
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
## 2023-10-10 ENCOUNTER — TREATMENT (OUTPATIENT)
Dept: PHYSICAL THERAPY | Facility: CLINIC | Age: 43
End: 2023-10-10
Payer: COMMERCIAL

## 2023-10-10 DIAGNOSIS — S06.0X0D CONCUSSION WITH NO LOSS OF CONSCIOUSNESS, SUBSEQUENT ENCOUNTER: ICD-10-CM

## 2023-10-10 DIAGNOSIS — R42 DIZZINESS: ICD-10-CM

## 2023-10-10 DIAGNOSIS — M54.2 CERVICALGIA: Primary | ICD-10-CM

## 2023-10-10 PROCEDURE — 97035 APP MDLTY 1+ULTRASOUND EA 15: CPT | Mod: GP

## 2023-10-10 PROCEDURE — 97140 MANUAL THERAPY 1/> REGIONS: CPT | Mod: GP

## 2023-10-10 PROCEDURE — 97110 THERAPEUTIC EXERCISES: CPT | Mod: GP

## 2023-10-13 RX ORDER — CHOLECALCIFEROL (VITAMIN D3) 50 MCG
50 TABLET ORAL DAILY
Qty: 30 TABLET | Refills: 0 | Status: SHIPPED | OUTPATIENT
Start: 2023-10-13

## 2023-10-17 ENCOUNTER — OFFICE VISIT (OUTPATIENT)
Dept: OBSTETRICS AND GYNECOLOGY | Facility: CLINIC | Age: 43
End: 2023-10-17
Payer: COMMERCIAL

## 2023-10-17 VITALS
HEIGHT: 64 IN | SYSTOLIC BLOOD PRESSURE: 132 MMHG | DIASTOLIC BLOOD PRESSURE: 78 MMHG | BODY MASS INDEX: 35.34 KG/M2 | WEIGHT: 207 LBS

## 2023-10-17 DIAGNOSIS — N89.8 VAGINAL DISCHARGE: ICD-10-CM

## 2023-10-17 DIAGNOSIS — R10.2 PELVIC PRESSURE IN FEMALE: Primary | ICD-10-CM

## 2023-10-17 PROCEDURE — 3075F SYST BP GE 130 - 139MM HG: CPT | Performed by: NURSE PRACTITIONER

## 2023-10-17 PROCEDURE — 87205 SMEAR GRAM STAIN: CPT

## 2023-10-17 PROCEDURE — 3044F HG A1C LEVEL LT 7.0%: CPT | Performed by: NURSE PRACTITIONER

## 2023-10-17 PROCEDURE — 3008F BODY MASS INDEX DOCD: CPT | Performed by: NURSE PRACTITIONER

## 2023-10-17 PROCEDURE — 1036F TOBACCO NON-USER: CPT | Performed by: NURSE PRACTITIONER

## 2023-10-17 PROCEDURE — 3078F DIAST BP <80 MM HG: CPT | Performed by: NURSE PRACTITIONER

## 2023-10-17 PROCEDURE — 99214 OFFICE O/P EST MOD 30 MIN: CPT | Performed by: NURSE PRACTITIONER

## 2023-10-17 RX ORDER — CLINDAMYCIN PHOSPHATE 20 MG/G
CREAM VAGINAL
Qty: 40 G | Refills: 3 | Status: SHIPPED | OUTPATIENT
Start: 2023-10-17 | End: 2023-11-06 | Stop reason: HOSPADM

## 2023-10-17 ASSESSMENT — ENCOUNTER SYMPTOMS
CONSTITUTIONAL NEGATIVE: 0
SORE THROAT: 0
HEADACHES: 1
ANOREXIA: 0
NAUSEA: 1
HEMATOLOGIC/LYMPHATIC NEGATIVE: 0
FREQUENCY: 1
ABDOMINAL PAIN: 0
PSYCHIATRIC NEGATIVE: 0
DYSURIA: 0
HEMATURIA: 0
BACK PAIN: 0
ENDOCRINE NEGATIVE: 0
FLANK PAIN: 0
MUSCULOSKELETAL NEGATIVE: 0
CARDIOVASCULAR NEGATIVE: 0
DIARRHEA: 1
NEUROLOGICAL NEGATIVE: 0
CHILLS: 0
VOMITING: 0
GASTROINTESTINAL NEGATIVE: 0
EYES NEGATIVE: 0
CONSTIPATION: 0
ALLERGIC/IMMUNOLOGIC NEGATIVE: 0
RESPIRATORY NEGATIVE: 0
FEVER: 0

## 2023-10-17 ASSESSMENT — PAIN SCALES - GENERAL: PAINLEVEL: 1

## 2023-10-17 NOTE — PROGRESS NOTES
Subjective   Patient ID: Tsering Carranza is a 43 y.o. female who presents for Urinary Symptom, Vaginitis/Bacterial Vaginosis, and vaginal irritation .  Female  Problem  The patient's primary symptoms include pelvic pain and vaginal discharge. The patient's pertinent negatives include no genital itching, genital lesions, genital odor, genital rash, missed menses or vaginal bleeding. This is a chronic problem. The current episode started more than 1 month ago. The problem occurs every several days. The problem has been waxing and waning. The pain is mild. The problem affects both sides. She is not pregnant. Associated symptoms include diarrhea, frequency, headaches and nausea. Pertinent negatives include no abdominal pain, anorexia, back pain, chills, constipation, discolored urine, dysuria, fever, flank pain, hematuria, joint pain, joint swelling, painful intercourse, rash, sore throat, urgency or vomiting. The vaginal discharge was clear and watery. She has not been passing clots. She has not been passing tissue. Nothing aggravates the symptoms. She is not sexually active. No, her partner does not have an STD. She uses abstinence for contraception. Her menstrual history has been regular.     Several months ago symptoms started  Pelvic pressure and tight feeling; intermittne, no precipitating factor  Pelvic US showed a fibroid and a left sided ovarian cyst  PFPT cleared her, didn't feel her symptoms were related to the pelvic floor muscles  An increase in vaginal discharge  No change in odor  Vaginal hygiene: dove unscented soap  No concern for STI  Review of Systems   Constitutional:  Negative for chills and fever.   HENT:  Negative for sore throat.    Gastrointestinal:  Positive for diarrhea and nausea. Negative for abdominal pain, anorexia, constipation and vomiting.   Genitourinary:  Positive for frequency, pelvic pain and vaginal discharge. Negative for dysuria, flank pain, hematuria, missed menses and  urgency.   Musculoskeletal:  Negative for back pain and joint pain.   Skin:  Negative for rash.   Neurological:  Positive for headaches.       Objective   Physical Exam  Genitourinary:     Vagina: Vaginal discharge present. No tenderness or lesions.      Cervix: Discharge present.      Uterus: Enlarged and tender.           Comments: Erythema and irritation around the vestibule  White discharge, thick  I reviewed the specimen and these are my findings: wet mount: -yeast, -clue cells, -whiff test; an increase in WBC's   Vaginal ph: 5        Assessment/Plan   Diagnoses and all orders for this visit:  Pelvic pressure in female  Vaginal discharge    Vaginitis panel sent; but DIV suspected  Will also consider PID  Follow up in 5-6 weels

## 2023-10-18 LAB
CLUE CELLS VAG LPF-#/AREA: NORMAL /[LPF]
NUGENT SCORE: 1
YEAST VAG WET PREP-#/AREA: NORMAL

## 2023-10-20 ENCOUNTER — TREATMENT (OUTPATIENT)
Dept: PHYSICAL THERAPY | Facility: CLINIC | Age: 43
End: 2023-10-20
Payer: COMMERCIAL

## 2023-10-20 ENCOUNTER — TELEPHONE (OUTPATIENT)
Dept: OBSTETRICS AND GYNECOLOGY | Facility: CLINIC | Age: 43
End: 2023-10-20
Payer: COMMERCIAL

## 2023-10-20 DIAGNOSIS — R42 DIZZINESS: ICD-10-CM

## 2023-10-20 DIAGNOSIS — S06.0X0D CONCUSSION WITH NO LOSS OF CONSCIOUSNESS, SUBSEQUENT ENCOUNTER: ICD-10-CM

## 2023-10-20 DIAGNOSIS — M54.2 CERVICALGIA: Primary | ICD-10-CM

## 2023-10-20 PROCEDURE — 97140 MANUAL THERAPY 1/> REGIONS: CPT | Mod: GP

## 2023-10-20 PROCEDURE — 97035 APP MDLTY 1+ULTRASOUND EA 15: CPT | Mod: GP

## 2023-10-20 PROCEDURE — 97110 THERAPEUTIC EXERCISES: CPT | Mod: GP

## 2023-10-20 NOTE — PROGRESS NOTES
Physical Therapy      Physical Therapy Treatment    Patient Name: Tsering Carranza  MRN: [id number]  Today's Date: 10/10/23  Visit: 5/8  Auth date: 9/7/23-11/26/23      SUBJECTIVE:  Patient reports increased computer time (2 hours vs 1) caused increased brain fog.  Also, increased activity causes increased sx.    OBJECTIVE:  PCS pre= 66    TREATMENT:  Therex:  Rosendo Treadmill Training:  -able to walk up to 1.7 mph  -stopped at 10 min    Manual Therapy:  Ana Paula BERRY  Modalities:  US to R UT x 8 min at 3 MHz cont, 1.5 w/cm2    ASSESSMENT:  Patient making slow gains in sx resolution.  Main sx are brain fog, cognitive fatigue    PLAN:    Continue to address neck pain, activity tolerance.

## 2023-10-20 NOTE — TELEPHONE ENCOUNTER
Pt verified by name and .  Pt is aware of Presley Ku's message:  Presley Ku, APRN-CNP  Iraida Swain, RN  Can you please let the patient know that her vaginitis panel is negative, I suspect she has DIV which I treated at the time of her appointment with vaginal cleocin; can you verify that she picked it up  Pt states she did  the cleoscin.  Pt has no questions or concerns at this time.

## 2023-10-25 ENCOUNTER — OFFICE VISIT (OUTPATIENT)
Dept: PRIMARY CARE | Facility: CLINIC | Age: 43
End: 2023-10-25
Payer: COMMERCIAL

## 2023-10-25 ENCOUNTER — APPOINTMENT (OUTPATIENT)
Dept: OTOLARYNGOLOGY | Facility: CLINIC | Age: 43
End: 2023-10-25
Payer: COMMERCIAL

## 2023-10-25 VITALS — WEIGHT: 209.2 LBS | HEIGHT: 63 IN | BODY MASS INDEX: 37.07 KG/M2

## 2023-10-25 DIAGNOSIS — F07.81 POST-CONCUSSION SYNDROME: Primary | ICD-10-CM

## 2023-10-25 PROCEDURE — 99214 OFFICE O/P EST MOD 30 MIN: CPT | Performed by: FAMILY MEDICINE

## 2023-10-25 PROCEDURE — 3044F HG A1C LEVEL LT 7.0%: CPT | Performed by: FAMILY MEDICINE

## 2023-10-25 PROCEDURE — 3008F BODY MASS INDEX DOCD: CPT | Performed by: FAMILY MEDICINE

## 2023-10-25 PROCEDURE — 1036F TOBACCO NON-USER: CPT | Performed by: FAMILY MEDICINE

## 2023-10-25 NOTE — LETTER
October 25, 2023     Patient: Tsering Carranza   YOB: 1980   Date of Visit: 10/25/2023       To Whom It May Concern:    Tsering Carranza was seen in my clinic on 10/25/2023 at 9:30 am. Please excuse Tsering for her absence from work on this day to make the appointment.    If you have any questions or concerns, please don't hesitate to call.     Seen today in concussion clinic. Ok for 2 hours per day of work from home. She will follow up in about 2 weeks.     Sincerely,         Fernando Carolina, DO        CC: No Recipients
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
## 2023-10-27 ENCOUNTER — TREATMENT (OUTPATIENT)
Dept: PHYSICAL THERAPY | Facility: CLINIC | Age: 43
End: 2023-10-27
Payer: COMMERCIAL

## 2023-10-27 DIAGNOSIS — G43.809 VESTIBULAR MIGRAINE: ICD-10-CM

## 2023-10-27 DIAGNOSIS — F07.81 POST-CONCUSSION SYNDROME: ICD-10-CM

## 2023-10-27 DIAGNOSIS — M54.2 CERVICALGIA: Primary | ICD-10-CM

## 2023-10-27 PROCEDURE — 97035 APP MDLTY 1+ULTRASOUND EA 15: CPT | Mod: GP

## 2023-10-27 PROCEDURE — 97110 THERAPEUTIC EXERCISES: CPT | Mod: GP

## 2023-10-27 PROCEDURE — 97140 MANUAL THERAPY 1/> REGIONS: CPT | Mod: GP

## 2023-10-27 NOTE — PROGRESS NOTES
Physical Therapy      Physical Therapy Treatment    Patient Name: Tsering Carranza  MRN: [id number]  Today's Date: 10/10/23  Visit: 6/8  Auth date: 9/7/23-11/26/23      SUBJECTIVE:  Patient reports feels a little better, less brain fog.  Saw  yesterday who increased work hours to 2 hours at a time.  Lifted something yesterday which increased R UT pain.    OBJECTIVE:  PCS pre= 54    TREATMENT:  Therex:  Rosendo Treadmill Training:  -able to walk up to 1.7 mph  -stopped at 10 min    Manual Therapy:  Ana Paula R UT  Prone manual work to neck   PA glides to cervical and upper thoracic spine    Modalities:  US to R UT x 8 min at 3 MHz cont, 1.5 w/cm2    ASSESSMENT:  Patient with a bit less sx today.  Does have chronically tight R UT.  Educated on use of theracane to treat trigger points.    PLAN:    Continue to address neck pain, activity tolerance.

## 2023-11-03 ENCOUNTER — APPOINTMENT (OUTPATIENT)
Dept: PHYSICAL THERAPY | Facility: CLINIC | Age: 43
End: 2023-11-03
Payer: COMMERCIAL

## 2023-11-06 ENCOUNTER — OFFICE VISIT (OUTPATIENT)
Dept: OBSTETRICS AND GYNECOLOGY | Facility: CLINIC | Age: 43
End: 2023-11-06
Payer: COMMERCIAL

## 2023-11-06 VITALS — SYSTOLIC BLOOD PRESSURE: 110 MMHG | DIASTOLIC BLOOD PRESSURE: 70 MMHG

## 2023-11-06 DIAGNOSIS — N89.8 VAGINAL IRRITATION: ICD-10-CM

## 2023-11-06 DIAGNOSIS — R10.2 PELVIC PAIN: Primary | ICD-10-CM

## 2023-11-06 PROCEDURE — 3074F SYST BP LT 130 MM HG: CPT | Performed by: NURSE PRACTITIONER

## 2023-11-06 PROCEDURE — 87070 CULTURE OTHR SPECIMN AEROBIC: CPT

## 2023-11-06 PROCEDURE — 3044F HG A1C LEVEL LT 7.0%: CPT | Performed by: NURSE PRACTITIONER

## 2023-11-06 PROCEDURE — 3008F BODY MASS INDEX DOCD: CPT | Performed by: NURSE PRACTITIONER

## 2023-11-06 PROCEDURE — 99213 OFFICE O/P EST LOW 20 MIN: CPT | Performed by: NURSE PRACTITIONER

## 2023-11-06 PROCEDURE — 1036F TOBACCO NON-USER: CPT | Performed by: NURSE PRACTITIONER

## 2023-11-06 PROCEDURE — 3078F DIAST BP <80 MM HG: CPT | Performed by: NURSE PRACTITIONER

## 2023-11-06 RX ORDER — CLOTRIMAZOLE AND BETAMETHASONE DIPROPIONATE 10; .64 MG/G; MG/G
1 CREAM TOPICAL 2 TIMES DAILY
Qty: 6 G | Refills: 0 | Status: SHIPPED | OUTPATIENT
Start: 2023-11-06 | End: 2023-12-08

## 2023-11-06 ASSESSMENT — ENCOUNTER SYMPTOMS: FREQUENCY: 1
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
## 2023-11-07 ENCOUNTER — APPOINTMENT (OUTPATIENT)
Dept: RADIOLOGY | Facility: CLINIC | Age: 43
End: 2023-11-07
Payer: COMMERCIAL

## 2023-11-08 ENCOUNTER — HOSPITAL ENCOUNTER (OUTPATIENT)
Dept: VASCULAR MEDICINE | Facility: HOSPITAL | Age: 43
Discharge: HOME | End: 2023-11-08
Payer: COMMERCIAL

## 2023-11-08 ENCOUNTER — APPOINTMENT (OUTPATIENT)
Dept: PEDIATRIC GASTROENTEROLOGY | Facility: CLINIC | Age: 43
End: 2023-11-08
Payer: COMMERCIAL

## 2023-11-08 ENCOUNTER — NURSE TRIAGE (OUTPATIENT)
Dept: ADMISSION | Facility: HOSPITAL | Age: 43
End: 2023-11-08
Payer: COMMERCIAL

## 2023-11-08 DIAGNOSIS — M79.605 PAIN IN LEFT LEG: ICD-10-CM

## 2023-11-08 DIAGNOSIS — I82.532 CHRONIC DEEP VEIN THROMBOSIS (DVT) OF POPLITEAL VEIN OF LEFT LOWER EXTREMITY (MULTI): ICD-10-CM

## 2023-11-08 DIAGNOSIS — Z86.718 HISTORY OF DVT (DEEP VEIN THROMBOSIS): Primary | ICD-10-CM

## 2023-11-08 DIAGNOSIS — Z86.718 HISTORY OF DVT (DEEP VEIN THROMBOSIS): ICD-10-CM

## 2023-11-08 PROCEDURE — 93971 EXTREMITY STUDY: CPT

## 2023-11-08 PROCEDURE — 93971 EXTREMITY STUDY: CPT | Performed by: INTERNAL MEDICINE

## 2023-11-08 NOTE — TELEPHONE ENCOUNTER
I called pt and let her know her preliminary results per Mere Beckman's request. No DVT; just chronic insufficiency that she has had in the past. I let her know we would call her back if her final report came back any different.  Patient verbalized understanding and has no further questions or concerns at this time.

## 2023-11-08 NOTE — TELEPHONE ENCOUNTER
"Tsering called the office with c/o constant 3/10 pain in her left leg that started Monday evening. She has a history of a DVT in this leg; in the popliteal vein which is where her pain is located. She does not notice any redness, warmth or swelling to the leg.   She had a bilateral LE ultrasound in July and these were the results: \"No blood clot. Venous insufficiency in popliteal vein LLE which is likely from past blood clot\".  Asking if another ultrasound can be ordered for her.  She prefers to go to [facility]. Secure Chat sent to team.     Additional Information   Commented on: Where is the pain? Is there more than one place where you're having pain?     Left leg behind knee; pt has a history of a DVT in the popliteal vein from July 2022.   Commented on: How long have they been going on?     Monday evening   Commented on: What helps these problems?     Per pt she has not tried anything; resting does not relieve pain    Protocols used: Pain    "

## 2023-11-08 NOTE — TELEPHONE ENCOUNTER
STAT ultrasound ordered by Mere Beckman. I called [facility]; they are not open today to perform ultrasounds. Patient scheduled at [facility] at 1:00pm.   Will keep a look out for results and notify provider via Secure Chat when they are back.   No further questions or concerns at this time.

## 2023-11-09 ENCOUNTER — TREATMENT (OUTPATIENT)
Dept: PHYSICAL THERAPY | Facility: CLINIC | Age: 43
End: 2023-11-09
Payer: COMMERCIAL

## 2023-11-09 ENCOUNTER — OFFICE VISIT (OUTPATIENT)
Dept: PRIMARY CARE | Facility: CLINIC | Age: 43
End: 2023-11-09
Payer: COMMERCIAL

## 2023-11-09 VITALS
SYSTOLIC BLOOD PRESSURE: 163 MMHG | DIASTOLIC BLOOD PRESSURE: 102 MMHG | WEIGHT: 213 LBS | BODY MASS INDEX: 37.74 KG/M2 | HEIGHT: 63 IN | HEART RATE: 71 BPM

## 2023-11-09 DIAGNOSIS — F07.81 POST-CONCUSSION SYNDROME: ICD-10-CM

## 2023-11-09 DIAGNOSIS — F07.81 POST-CONCUSSION SYNDROME: Primary | ICD-10-CM

## 2023-11-09 DIAGNOSIS — M54.2 CERVICALGIA: Primary | ICD-10-CM

## 2023-11-09 DIAGNOSIS — G43.809 VESTIBULAR MIGRAINE: ICD-10-CM

## 2023-11-09 PROCEDURE — 3008F BODY MASS INDEX DOCD: CPT | Performed by: FAMILY MEDICINE

## 2023-11-09 PROCEDURE — 3080F DIAST BP >= 90 MM HG: CPT | Performed by: FAMILY MEDICINE

## 2023-11-09 PROCEDURE — 1036F TOBACCO NON-USER: CPT | Performed by: FAMILY MEDICINE

## 2023-11-09 PROCEDURE — 3044F HG A1C LEVEL LT 7.0%: CPT | Performed by: FAMILY MEDICINE

## 2023-11-09 PROCEDURE — 97140 MANUAL THERAPY 1/> REGIONS: CPT | Mod: GP

## 2023-11-09 PROCEDURE — 99213 OFFICE O/P EST LOW 20 MIN: CPT | Performed by: FAMILY MEDICINE

## 2023-11-09 PROCEDURE — 97110 THERAPEUTIC EXERCISES: CPT | Mod: GP

## 2023-11-09 PROCEDURE — 3077F SYST BP >= 140 MM HG: CPT | Performed by: FAMILY MEDICINE

## 2023-11-09 NOTE — PROGRESS NOTES
Physical Therapy      Physical Therapy Treatment    Patient Name: Tsreing Carranza  MRN: [id number]  Today's Date: 11/9/23  Visit: 7/8  Auth date: 9/7/23-11/26/23      SUBJECTIVE:  Patient reports main sx are still mild imbalance and brain fog.    Still struggles with work schedule (2 hrs a day) but feels may have to return to FT soon.  OBJECTIVE:  PCS pre= 57    TREATMENT:  Therex:  Rosendo Treadmill Training:  -able to walk up to 1.7 mph  -stopped at 10 min    Manual Therapy:  Ana Paula BERRY  Prone manual work to neck   PA glides to cervical and upper thoracic spine    ASSESSMENT:  Patient continues to have concussion sx, worst being brain fog.  Discussed alternative treatments, ie: acupuncture if desired.    PLAN:    Recheck next visit.
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
## 2023-11-10 ENCOUNTER — ANCILLARY PROCEDURE (OUTPATIENT)
Dept: RADIOLOGY | Facility: CLINIC | Age: 43
End: 2023-11-10
Payer: COMMERCIAL

## 2023-11-10 ENCOUNTER — TELEPHONE (OUTPATIENT)
Dept: OBSTETRICS AND GYNECOLOGY | Facility: CLINIC | Age: 43
End: 2023-11-10

## 2023-11-10 DIAGNOSIS — R10.2 PELVIC PAIN: ICD-10-CM

## 2023-11-10 PROCEDURE — 76856 US EXAM PELVIC COMPLETE: CPT | Performed by: RADIOLOGY

## 2023-11-10 PROCEDURE — 76830 TRANSVAGINAL US NON-OB: CPT

## 2023-11-10 PROCEDURE — 76830 TRANSVAGINAL US NON-OB: CPT | Performed by: RADIOLOGY

## 2023-11-10 NOTE — TELEPHONE ENCOUNTER
Patient had visit on Monday 11/06/23, she is asking if urine results and culture results were back yet? If not she is wondering how long it may take?

## 2023-11-10 NOTE — TELEPHONE ENCOUNTER
Left message on patient's voicemail- urine was not sent for any further testing. Genital culture is still pending. Office will call when completed. Patient is on Mychart and should also be able to see results when done.  Any question call to the office

## 2023-11-14 LAB — BACTERIA GENITAL AEROBE CULT: NORMAL

## 2023-11-17 ENCOUNTER — TREATMENT (OUTPATIENT)
Dept: PHYSICAL THERAPY | Facility: CLINIC | Age: 43
End: 2023-11-17
Payer: COMMERCIAL

## 2023-11-17 DIAGNOSIS — G43.809 VESTIBULAR MIGRAINE: ICD-10-CM

## 2023-11-17 DIAGNOSIS — M54.2 CERVICALGIA: Primary | ICD-10-CM

## 2023-11-17 DIAGNOSIS — F07.81 POST-CONCUSSION SYNDROME: ICD-10-CM

## 2023-11-17 PROCEDURE — 97110 THERAPEUTIC EXERCISES: CPT | Mod: GP

## 2023-11-17 NOTE — PROGRESS NOTES
Physical Therapy      Physical Therapy Treatment    Patient Name: Tsering Carranza  MRN: [id number]  Today's Date: 11/17/23  Visit: 8/8  Auth date: 9/7/23-11/26/23    SUBJECTIVE:  Patient reports has returned to work 8 hrs a day (from home).  Takes breaks prn.  Has been feeling a little better overall.  Struggling with fatigue mostly.  Headaches improving as well.    OBJECTIVE:  PCS pre= 34    VOMS:  -smooth pursuits= WNL  -saccades= mild dizziness  -VOR= mild dizziness    Cervical AROM (R/L):  -SB= 35/29  -rotation= 62/67  -flexion= WNL  -extension= WNL    TREATMENT:  Therex:  Rosendo Treadmill Training:  -able to walk up to 1.7 mph  -stopped at 13 min    Manual Therapy:  Ana Paula BERRY    ASSESSMENT:  Nice improvement seen in sx today vs last visit despite returning to work full time.  Would benefit from brief continuation of PT.    PLAN:   Will request further PT visits with decreased frequency.

## 2023-11-26 ENCOUNTER — HOSPITAL ENCOUNTER (EMERGENCY)
Facility: HOSPITAL | Age: 43
Discharge: HOME | End: 2023-11-26
Payer: COMMERCIAL

## 2023-11-26 ENCOUNTER — APPOINTMENT (OUTPATIENT)
Dept: RADIOLOGY | Facility: HOSPITAL | Age: 43
End: 2023-11-26
Payer: COMMERCIAL

## 2023-11-26 ENCOUNTER — HOSPITAL ENCOUNTER (EMERGENCY)
Facility: HOSPITAL | Age: 43
Discharge: HOME | End: 2023-11-27
Attending: STUDENT IN AN ORGANIZED HEALTH CARE EDUCATION/TRAINING PROGRAM
Payer: COMMERCIAL

## 2023-11-26 VITALS
OXYGEN SATURATION: 98 % | HEIGHT: 63 IN | WEIGHT: 214 LBS | HEART RATE: 93 BPM | RESPIRATION RATE: 18 BRPM | BODY MASS INDEX: 37.92 KG/M2 | TEMPERATURE: 98.2 F | DIASTOLIC BLOOD PRESSURE: 78 MMHG | SYSTOLIC BLOOD PRESSURE: 141 MMHG

## 2023-11-26 DIAGNOSIS — J18.9 PNEUMONIA OF RIGHT LOWER LOBE DUE TO INFECTIOUS ORGANISM: Primary | ICD-10-CM

## 2023-11-26 DIAGNOSIS — T78.40XA ALLERGIC REACTION, INITIAL ENCOUNTER: Primary | ICD-10-CM

## 2023-11-26 LAB
ANION GAP SERPL CALC-SCNC: 15 MMOL/L (ref 10–20)
BASOPHILS # BLD AUTO: 0.06 X10*3/UL (ref 0–0.1)
BASOPHILS NFR BLD AUTO: 0.5 %
BUN SERPL-MCNC: 9 MG/DL (ref 6–23)
CALCIUM SERPL-MCNC: 9 MG/DL (ref 8.6–10.3)
CHLORIDE SERPL-SCNC: 101 MMOL/L (ref 98–107)
CO2 SERPL-SCNC: 22 MMOL/L (ref 21–32)
CREAT SERPL-MCNC: 0.79 MG/DL (ref 0.5–1.05)
EOSINOPHIL # BLD AUTO: 0.16 X10*3/UL (ref 0–0.7)
EOSINOPHIL NFR BLD AUTO: 1.4 %
ERYTHROCYTE [DISTWIDTH] IN BLOOD BY AUTOMATED COUNT: 12.9 % (ref 11.5–14.5)
FLUAV RNA RESP QL NAA+PROBE: NOT DETECTED
FLUBV RNA RESP QL NAA+PROBE: NOT DETECTED
GFR SERPL CREATININE-BSD FRML MDRD: >90 ML/MIN/1.73M*2
GLUCOSE SERPL-MCNC: 146 MG/DL (ref 74–99)
HCT VFR BLD AUTO: 41.3 % (ref 36–46)
HGB BLD-MCNC: 13.8 G/DL (ref 12–16)
IMM GRANULOCYTES # BLD AUTO: 0.03 X10*3/UL (ref 0–0.7)
IMM GRANULOCYTES NFR BLD AUTO: 0.3 % (ref 0–0.9)
LACTATE SERPL-SCNC: 1.4 MMOL/L (ref 0.4–2)
LYMPHOCYTES # BLD AUTO: 1.59 X10*3/UL (ref 1.2–4.8)
LYMPHOCYTES NFR BLD AUTO: 13.9 %
MCH RBC QN AUTO: 28.6 PG (ref 26–34)
MCHC RBC AUTO-ENTMCNC: 33.4 G/DL (ref 32–36)
MCV RBC AUTO: 86 FL (ref 80–100)
MONOCYTES # BLD AUTO: 0.57 X10*3/UL (ref 0.1–1)
MONOCYTES NFR BLD AUTO: 5 %
NEUTROPHILS # BLD AUTO: 8.99 X10*3/UL (ref 1.2–7.7)
NEUTROPHILS NFR BLD AUTO: 78.9 %
NRBC BLD-RTO: 0 /100 WBCS (ref 0–0)
PLATELET # BLD AUTO: 346 X10*3/UL (ref 150–450)
POTASSIUM SERPL-SCNC: 3.9 MMOL/L (ref 3.5–5.3)
RBC # BLD AUTO: 4.82 X10*6/UL (ref 4–5.2)
SARS-COV-2 RNA RESP QL NAA+PROBE: NOT DETECTED
SODIUM SERPL-SCNC: 134 MMOL/L (ref 136–145)
WBC # BLD AUTO: 11.4 X10*3/UL (ref 4.4–11.3)

## 2023-11-26 PROCEDURE — 2500000004 HC RX 250 GENERAL PHARMACY W/ HCPCS (ALT 636 FOR OP/ED): Performed by: PHYSICIAN ASSISTANT

## 2023-11-26 PROCEDURE — 36415 COLL VENOUS BLD VENIPUNCTURE: CPT | Performed by: PHYSICIAN ASSISTANT

## 2023-11-26 PROCEDURE — 71046 X-RAY EXAM CHEST 2 VIEWS: CPT | Performed by: RADIOLOGY

## 2023-11-26 PROCEDURE — 94760 N-INVAS EAR/PLS OXIMETRY 1: CPT | Mod: CCI | Performed by: PHYSICIAN ASSISTANT

## 2023-11-26 PROCEDURE — 83605 ASSAY OF LACTIC ACID: CPT | Performed by: PHYSICIAN ASSISTANT

## 2023-11-26 PROCEDURE — 71046 X-RAY EXAM CHEST 2 VIEWS: CPT

## 2023-11-26 PROCEDURE — 87636 SARSCOV2 & INF A&B AMP PRB: CPT | Performed by: EMERGENCY MEDICINE

## 2023-11-26 PROCEDURE — 99281 EMR DPT VST MAYX REQ PHY/QHP: CPT | Performed by: STUDENT IN AN ORGANIZED HEALTH CARE EDUCATION/TRAINING PROGRAM

## 2023-11-26 PROCEDURE — 85025 COMPLETE CBC W/AUTO DIFF WBC: CPT | Performed by: PHYSICIAN ASSISTANT

## 2023-11-26 PROCEDURE — 80048 BASIC METABOLIC PNL TOTAL CA: CPT | Performed by: PHYSICIAN ASSISTANT

## 2023-11-26 PROCEDURE — 2500000001 HC RX 250 WO HCPCS SELF ADMINISTERED DRUGS (ALT 637 FOR MEDICARE OP): Performed by: PHYSICIAN ASSISTANT

## 2023-11-26 PROCEDURE — 99283 EMERGENCY DEPT VISIT LOW MDM: CPT

## 2023-11-26 PROCEDURE — 99284 EMERGENCY DEPT VISIT MOD MDM: CPT

## 2023-11-26 RX ORDER — IBUPROFEN 600 MG/1
600 TABLET ORAL EVERY 8 HOURS PRN
Qty: 20 TABLET | Refills: 0 | Status: SHIPPED | OUTPATIENT
Start: 2023-11-26 | End: 2023-12-08 | Stop reason: ALTCHOICE

## 2023-11-26 RX ORDER — ONDANSETRON 4 MG/1
4 TABLET, ORALLY DISINTEGRATING ORAL EVERY 8 HOURS PRN
Qty: 10 TABLET | Refills: 0 | Status: SHIPPED | OUTPATIENT
Start: 2023-11-26 | End: 2023-12-08 | Stop reason: ALTCHOICE

## 2023-11-26 RX ORDER — DOXYCYCLINE HYCLATE 100 MG
100 TABLET ORAL ONCE
Status: COMPLETED | OUTPATIENT
Start: 2023-11-26 | End: 2023-11-26

## 2023-11-26 RX ORDER — DOXYCYCLINE 100 MG/1
100 TABLET ORAL 2 TIMES DAILY
Qty: 10 TABLET | Refills: 0 | Status: SHIPPED | OUTPATIENT
Start: 2023-11-26 | End: 2023-11-28 | Stop reason: SINTOL

## 2023-11-26 RX ORDER — AMOXICILLIN AND CLAVULANATE POTASSIUM 875; 125 MG/1; MG/1
1 TABLET, FILM COATED ORAL EVERY 12 HOURS
Qty: 10 TABLET | Refills: 0 | Status: SHIPPED | OUTPATIENT
Start: 2023-11-26 | End: 2023-12-08 | Stop reason: ALTCHOICE

## 2023-11-26 RX ORDER — AMOXICILLIN AND CLAVULANATE POTASSIUM 875; 125 MG/1; MG/1
1 TABLET, FILM COATED ORAL ONCE
Status: COMPLETED | OUTPATIENT
Start: 2023-11-26 | End: 2023-11-26

## 2023-11-26 RX ORDER — ACETAMINOPHEN 325 MG/1
975 TABLET ORAL ONCE
Status: COMPLETED | OUTPATIENT
Start: 2023-11-26 | End: 2023-11-26

## 2023-11-26 RX ADMIN — ACETAMINOPHEN 975 MG: 325 TABLET ORAL at 01:35

## 2023-11-26 RX ADMIN — AMOXICILLIN AND CLAVULANATE POTASSIUM 875 MG: 875; 125 TABLET, FILM COATED ORAL at 03:39

## 2023-11-26 RX ADMIN — SODIUM CHLORIDE 1000 ML: 9 INJECTION, SOLUTION INTRAVENOUS at 01:01

## 2023-11-26 RX ADMIN — DOXYCYCLINE HYCLATE 100 MG: 100 TABLET, COATED ORAL at 03:39

## 2023-11-26 ASSESSMENT — PAIN - FUNCTIONAL ASSESSMENT
PAIN_FUNCTIONAL_ASSESSMENT: 0-10
PAIN_FUNCTIONAL_ASSESSMENT: 0-10

## 2023-11-26 ASSESSMENT — COLUMBIA-SUICIDE SEVERITY RATING SCALE - C-SSRS

## 2023-11-26 ASSESSMENT — PAIN DESCRIPTION - PAIN TYPE: TYPE: ACUTE PAIN

## 2023-11-26 ASSESSMENT — PAIN SCALES - GENERAL
PAINLEVEL_OUTOF10: 2
PAINLEVEL_OUTOF10: 7
PAINLEVEL_OUTOF10: 0 - NO PAIN

## 2023-11-26 ASSESSMENT — PAIN DESCRIPTION - LOCATION: LOCATION: HEAD

## 2023-11-26 NOTE — DISCHARGE INSTRUCTIONS
Please begin taking Augmentin and doxycycline for pneumonia.  Complete full course of antibiotics even if symptoms improve to ensure resolution of infection and prevent developing resistance to medication in the future.  If GI upset occurs may take with food or add probiotics.  Continue Tylenol and/or ibuprofen as needed for pain and fever.  May take Zofran as needed for nausea.  Follow up with primary care provider within the next week.  Return to nearest ER for any new or worsening symptoms or if symptoms do not improve after 48 hours of being on antibiotics.

## 2023-11-26 NOTE — Clinical Note
Tsering Carranza was seen and treated in our emergency department on 11/26/2023.  She may return to work on 11/28/2023.       If you have any questions or concerns, please don't hesitate to call.      Joanna Madden PA-C

## 2023-11-26 NOTE — ED PROVIDER NOTES
Chief Complaint   Patient presents with    Flu Symptoms     Pt c/o sinus pressure, HA, cough, chills, and dizziness that started Wednesday. Pt has fever in triage which she was unaware of. Pt has not taken any OTC medications for sx. Denies CP, SOB, n/v/d. States her home covid test was negative.      HPI:   Tsering Carranza is an 43 y.o. female with complicated medical history including YVETTE, MDD, DVT (not on anticoagulation), IBS, anemia, vitamin D deficiency, migraine disorder, T2DM, HLD, HTN presents to the ED for evaluation of multiple symptoms including sinus pressure, headache, cough, chills and lightheadedness x 4 days.  Patient says she took home COVID test which was negative.  She endorses 7/10 headache that she localizes to her bilateral temples.  Symptoms are worse with light.  She has not taken any medication for her symptoms.  She says at times she feels lightheaded but at times she feels off balance and like the room is spinning.  She endorses heart palpitations and says she feels like her heart is beating faster than normal.  She denies any numbness, tingling, focal weakness, vision changes, double vision, speech changes, word finding difficulty, chest pain, shortness of breath.  She says she had a concussion 3 months ago and she is not sure if that is contributing to her dizziness.  Currently started her menstrual today.  Denies sick contacts.    Medications: Synthroid, vitamin D, iron Gummies  Soc HX: Denies substance use  Allergies   Allergen Reactions    Azithromycin Dizziness and Other    Bee Pollen Itching    Nut - Unspecified Itching    Pollen Extracts Itching    Prednisone Other    Strawberry Itching    Duloxetine Other and Unknown     change in mental heart racing elevated BP    Fluoxetine Hcl Anxiety and Unknown     Panic, high anxiety, fatigue    Grass Pollen Itching and Rash    House Dust Itching and Rash   :  Past Medical History:   Diagnosis Date    Acute atopic conjunctivitis,  bilateral 09/17/2016    Allergic conjunctivitis of both eyes    Acute vaginitis 07/26/2021    Bacterial vaginitis    Acute vaginitis 07/28/2021    Bacterial vaginosis    Allergic rhinitis due to animal (cat) (dog) hair and dander 09/17/2016    Allergic rhinitis due to cats    Allergic rhinitis due to pollen 09/17/2016    Allergic rhinitis due to pollen    Allergy status to unspecified drugs, medicaments and biological substances     History of seasonal allergies    Body mass index (BMI) 38.0-38.9, adult 03/22/2022    BMI 38.0-38.9,adult    Calculus of kidney 06/16/2022    Kidney stone on left side    Elevated blood-pressure reading, without diagnosis of hypertension 11/11/2020    Elevated blood pressure reading    Hypothyroidism, unspecified 01/31/2014    Primary hypothyroidism    Other allergic rhinitis 09/17/2016    Allergic rhinitis due to dust mite    Other symptoms and signs involving the nervous system 02/06/2020    Suspected sleep apnea    Personal history of other diseases of the circulatory system 08/25/2016    History of cardiac murmur    Personal history of other diseases of the nervous system and sense organs 02/06/2020    History of restless legs syndrome    Personal history of other endocrine, nutritional and metabolic disease 07/21/2022    History of diabetes mellitus    Personal history of other specified conditions 02/22/2018    History of urinary frequency    Personal history of other specified conditions 12/11/2020    History of lump of left breast    Personal history of other specified conditions 09/14/2022    History of snoring    Personal history of other specified conditions 02/08/2014    History of fatigue    Personal history of urinary calculi 03/11/2019    History of renal calculi    Personal history of urinary calculi 05/10/2022    History of renal calculi    Polycystic ovarian syndrome 06/15/2020    PCOS (polycystic ovarian syndrome)    Type 2 diabetes mellitus without complications  (CMS/[facility]) 08/26/2022    Diet-controlled diabetes mellitus    Urinary tract infection, site not specified 04/01/2022    Acute UTI     Past Surgical History:   Procedure Laterality Date    OTHER SURGICAL HISTORY  05/16/2022    Tonsillectomy    OTHER SURGICAL HISTORY  09/14/2022    Cholecystectomy laparoscopic    OTHER SURGICAL HISTORY  11/20/2020    Lithotripsy     Family History   Problem Relation Name Age of Onset    Cholelithiasis Mother      Hyperlipidemia Mother      Other (PREDIABETES) Mother      Depression Father      Hypertension Father      Asthma Sister      Asthma Daughter      Other (CARDIAC DISORDER) Other GRANDFATHER       Physical Exam  Vitals and nursing note reviewed.   Constitutional:       General: She is not in acute distress.     Appearance: Normal appearance. She is not ill-appearing or toxic-appearing.   HENT:      Head: Normocephalic and atraumatic.      Right Ear: Tympanic membrane, ear canal and external ear normal.      Left Ear: Tympanic membrane, ear canal and external ear normal.      Mouth/Throat:      Mouth: Mucous membranes are moist.      Pharynx: Posterior oropharyngeal erythema present. No oropharyngeal exudate.   Eyes:      Extraocular Movements: Extraocular movements intact.      Pupils: Pupils are equal, round, and reactive to light.      Comments: No nystagmus, no pain/dizziness w/mvmt, normal test of skew   Neck:      Comments: Negative Brudzinski  Cardiovascular:      Rate and Rhythm: Regular rhythm. Tachycardia present.      Pulses: Normal pulses.      Heart sounds: No murmur heard.  Pulmonary:      Effort: Pulmonary effort is normal. No respiratory distress.      Breath sounds: Normal breath sounds.   Abdominal:      General: Bowel sounds are normal.      Palpations: Abdomen is soft.      Tenderness: There is no abdominal tenderness. There is no guarding or rebound.   Musculoskeletal:         General: No deformity or signs of injury. Normal range of motion.      Cervical  back: Normal range of motion and neck supple.   Lymphadenopathy:      Cervical: No cervical adenopathy.   Skin:     General: Skin is warm and dry.      Capillary Refill: Capillary refill takes less than 2 seconds.      Findings: No bruising or rash.   Neurological:      General: No focal deficit present.      Mental Status: She is alert.      Cranial Nerves: No cranial nerve deficit.   Psychiatric:         Mood and Affect: Mood normal.         Behavior: Behavior normal.     VS: As documented in the triage note and EMR flowsheet from this visit were reviewed.    EKG:  EKG INTERPRETATION:      Personally Reviewed      Rhythm: Sinus tachycardia      Rate: 109 bpm      Axis: Normal axis      Intervals:  Normal [place] interval 174     QRS Complex:  Normal      ST Segment:  Normal ST-T segments      QT Interval: QTc 425 ms     Compared with Prior: No MUSE      Medical Decision Making:   ED Course as of 11/26/23 0258   Sun Nov 26, 2023   0039 Vitals Reviewed: Hypertensive.  Temp 101.7.  Tachycardic.  Not tachypneic. No hypoxia.   [KA]   0131 Patient is 43-year-old female who presents to the ED for evaluation of multiple viral type symptoms including fever, chills, nausea, vomiting, dizziness, headache, palpitations.  On exam she is tachycardic, febrile.  Oxygenating at 100% on room air.  She has no nystagmus and normal test of skew.  She is not dizzy at rest.  No focal deficits.  No concern for posterior CVA.  Do not feel she necessitates CT imaging at this time.  Abdomen is soft, nontender, nondistended.  Low suspicion for acute intra-abdominal pathology.  Suspect viral etiology.  Patient will undergo COVID, flu.  Will obtain labs including CBC, BMP and lactate.  Technically meets SIRS criteria with tachycardia and fever.  I had ordered fluids and Tylenol for patient.  RN notified me that patient was declining medications.  I went in to talk with her and she agrees to Tylenol and fluids.  She did not want meclizine or Zofran.  [KA]   0142 Personally viewed labs.  Lactate normal.  CBC shows mild leukocytosis otherwise normal.  BMP shows blood glucose 146 and mild hyponatremia 134.  BUN to creatinine ratio 11.  Electrolytes and renal function normal.  COVID and flu negative. [KA]   0252 Discussed lab and imaging findings with patient.  According to up-to-date pathway, treatment for community-acquired pneumonia recommends Augmentin and either azithromycin or doxycycline.  Because patient has allergy to azithromycin will initiate on Doxy and Augmentin.  Encouraged completing full course of antibiotics even if symptoms improve.  [facility].  Will place referral for internal medicine as patient says she has no primary care provider for follow-up.  Advised patient to return to nearest ER for any new or worsening symptoms or if symptoms do not improve 48 hours after on antibiotics.  She is agreeable. [KA]      ED Course User Index  [KA] Joanna Madden PA-C         Diagnoses as of 11/26/23 0258   Pneumonia of right lower lobe due to infectious organism     Procedures     Chronic Medical Conditions Significantly Affecting Care:      Escalation of Care: Appropriate for outpatient management     Counseling: Spoke with the patient and discussed today´s findings, in addition to providing specific details for the plan of care and expected course.  Patient was given the opportunity to ask questions.    Discussed return precautions and importance of follow-up.  Advised to follow-up with primary care provider.  Advised to return to the ED for changing or worsening symptoms, new symptoms, complaint specific precautions, and precautions listed on the discharge paperwork.  Educated on the common potential side effects of medications prescribed.    I advised the patient that the emergency evaluation and treatment provided today doesn't end their need for medical care. It is very important that they follow-up with  their primary care provider or other specialist as instructed.    The plan of care was mutually agreed upon with the patient. The patient and/or family were given the opportunity to ask questions. All questions asked today in the ED were answered to the best of my ability with today's information.    I specifically advised the patient to return to the ED for changing or worsening symptoms, worrisome new symptoms, or for any complaint specific precautions listed on the discharge paperwork.    This patient was cared for in the setting of nationwide stress on resources and staffing.    This report was transcribed using voice recognition software.  Every effort was made to ensure accuracy, however, inadvertently computerized transcription errors may be present.       Joanna Madden PA-C  11/26/23 0258

## 2023-11-27 VITALS
TEMPERATURE: 98.6 F | SYSTOLIC BLOOD PRESSURE: 139 MMHG | OXYGEN SATURATION: 97 % | DIASTOLIC BLOOD PRESSURE: 34 MMHG | HEART RATE: 79 BPM | RESPIRATION RATE: 18 BRPM | WEIGHT: 215.83 LBS | HEIGHT: 63 IN | BODY MASS INDEX: 38.24 KG/M2

## 2023-11-27 NOTE — ED PROVIDER NOTES
HPI:    History provided by: Patient    43-year-old female with history of hypothyroidism, recent ED visit for pneumonia presents to the emergency department for allergic reaction.  She states that she was recently seen in the emergency department, diagnosed with pneumonia.  Was given Augmentin, doxycycline.  After she took the doxycycline, she began develop hives, itching of her chest, and burning of her extremities.  States that the Augmentin has not caused any symptoms, she has had this before with no issues.  Denies any shortness of breath, nausea, vomiting, feelings of throat swelling.  Took her last dose of doxycycline a few hours ago, has not taken anything for this allergic reaction as of yet.      ROS: All pertinent positives and negatives reviewed in HPI    PMHx: As above  PSHx: As above  Social: no Etoh, no tobacco, no social drugs  Social Determinants of Health impacting care: NA  Allergies: Reviewed in EMR  FMHx: Noncontributory to patient's chief complaint  Medications: Reviewed        Vital signs and triage note reviewed per nursing documentation    Physical Exam:     GEN: Sitting in no acute distress. Well-appearing, appears stated age  HEAD: Atraumatic, normocephalic  EYES: EOMI. Pupils equal and reactive.   HEENT: MMM. No oropharyngeal erythema, exudates, or uvular deviation.   Neck: Supple, full ROM  CVS: Regular rate and rhythm, no murmurs, gallops, or rubs  PULM: Clear to auscultation bilaterally. No wheezes, rales, rhonchi.   ABD: Soft, nontender to palpation. No rigidity, guarding, or tympany  BACK: No step offs or deformities, no CVA tenderness to palpation  EXT: +2 radial and DP pulses bilaterally. No pitting edema noted. No varicose veins  NEURO: Alert, keenly responsive. Moving all 4 extremities spontaneously with normal strength. Normal sensation in all 4 extremities   SKIN: No rashes, urticaria    Emergency Department Course    Medications Ordered: NA    EKG: NA    MDM        43-year-old  female who presents to the emergency department for an allergic reaction.  My assessment reveals a hemodynamically stable, well-appearing female in no acute distress.  Did have some hives, burning rash on her arms earlier after taking doxycycline which has abated prior to arrival.  I offered this patient symptomatic control with Benadryl, Solu-Medrol, Pepcid and she declined due to having numerous medication reactions in the past and not wanting to take extra medications.  Given that she is hemodynamically stable with no signs or symptoms of anaphylaxis I do believe this is reasonable.  She does not have any worsening shortness of breath, cough, fever, or tachycardia.  Therefore, I am not concerned for sepsis or worsening patient's pneumonia at this time.  She was encouraged to discontinue her doxycycline so she does not have subsequent allergic reactions, and was encouraged to continue taking her Augmentin for her pneumonia, and monitor her symptoms.  She was monitored in the emergency department for 2 hours with no clinical decompensation and was discharged in stable condition.    Consultations:    NA    Medications Prescribed:    NA    Disposition:    Discharged         Marcus Mahmood MD  11/27/23 0038

## 2023-11-28 ENCOUNTER — APPOINTMENT (OUTPATIENT)
Dept: RADIOLOGY | Facility: HOSPITAL | Age: 43
End: 2023-11-28
Payer: COMMERCIAL

## 2023-11-28 ENCOUNTER — HOSPITAL ENCOUNTER (EMERGENCY)
Facility: HOSPITAL | Age: 43
Discharge: HOME | End: 2023-11-28
Attending: EMERGENCY MEDICINE
Payer: COMMERCIAL

## 2023-11-28 ENCOUNTER — APPOINTMENT (OUTPATIENT)
Dept: PRIMARY CARE | Facility: CLINIC | Age: 43
End: 2023-11-28
Payer: COMMERCIAL

## 2023-11-28 ENCOUNTER — TELEMEDICINE (OUTPATIENT)
Dept: PRIMARY CARE | Facility: CLINIC | Age: 43
End: 2023-11-28
Payer: COMMERCIAL

## 2023-11-28 ENCOUNTER — APPOINTMENT (OUTPATIENT)
Dept: OBSTETRICS AND GYNECOLOGY | Facility: CLINIC | Age: 43
End: 2023-11-28
Payer: COMMERCIAL

## 2023-11-28 VITALS
DIASTOLIC BLOOD PRESSURE: 89 MMHG | HEIGHT: 63 IN | BODY MASS INDEX: 37.03 KG/M2 | HEART RATE: 72 BPM | RESPIRATION RATE: 16 BRPM | WEIGHT: 209 LBS | TEMPERATURE: 99.1 F | OXYGEN SATURATION: 98 % | SYSTOLIC BLOOD PRESSURE: 141 MMHG

## 2023-11-28 DIAGNOSIS — J18.9 PNEUMONIA DUE TO INFECTIOUS ORGANISM, UNSPECIFIED LATERALITY, UNSPECIFIED PART OF LUNG: Primary | ICD-10-CM

## 2023-11-28 DIAGNOSIS — R00.2 PALPITATIONS: ICD-10-CM

## 2023-11-28 PROBLEM — E11.9: Status: RESOLVED | Noted: 2023-03-22 | Resolved: 2023-11-28

## 2023-11-28 LAB
ALBUMIN SERPL BCP-MCNC: 4.3 G/DL (ref 3.4–5)
ALP SERPL-CCNC: 66 U/L (ref 33–110)
ALT SERPL W P-5'-P-CCNC: 21 U/L (ref 7–45)
ANION GAP SERPL CALC-SCNC: 12 MMOL/L (ref 10–20)
AST SERPL W P-5'-P-CCNC: 16 U/L (ref 9–39)
BASOPHILS # BLD AUTO: 0.06 X10*3/UL (ref 0–0.1)
BASOPHILS NFR BLD AUTO: 0.7 %
BILIRUB SERPL-MCNC: 0.3 MG/DL (ref 0–1.2)
BUN SERPL-MCNC: 12 MG/DL (ref 6–23)
CALCIUM SERPL-MCNC: 9 MG/DL (ref 8.6–10.3)
CHLORIDE SERPL-SCNC: 104 MMOL/L (ref 98–107)
CO2 SERPL-SCNC: 27 MMOL/L (ref 21–32)
CREAT SERPL-MCNC: 0.75 MG/DL (ref 0.5–1.05)
EOSINOPHIL # BLD AUTO: 0.16 X10*3/UL (ref 0–0.7)
EOSINOPHIL NFR BLD AUTO: 2 %
ERYTHROCYTE [DISTWIDTH] IN BLOOD BY AUTOMATED COUNT: 13 % (ref 11.5–14.5)
GFR SERPL CREATININE-BSD FRML MDRD: >90 ML/MIN/1.73M*2
GLUCOSE SERPL-MCNC: 137 MG/DL (ref 74–99)
HCT VFR BLD AUTO: 39.5 % (ref 36–46)
HGB BLD-MCNC: 13.1 G/DL (ref 12–16)
IMM GRANULOCYTES # BLD AUTO: 0.03 X10*3/UL (ref 0–0.7)
IMM GRANULOCYTES NFR BLD AUTO: 0.4 % (ref 0–0.9)
LYMPHOCYTES # BLD AUTO: 2.03 X10*3/UL (ref 1.2–4.8)
LYMPHOCYTES NFR BLD AUTO: 24.8 %
MCH RBC QN AUTO: 28.8 PG (ref 26–34)
MCHC RBC AUTO-ENTMCNC: 33.2 G/DL (ref 32–36)
MCV RBC AUTO: 87 FL (ref 80–100)
MONOCYTES # BLD AUTO: 0.37 X10*3/UL (ref 0.1–1)
MONOCYTES NFR BLD AUTO: 4.5 %
NEUTROPHILS # BLD AUTO: 5.52 X10*3/UL (ref 1.2–7.7)
NEUTROPHILS NFR BLD AUTO: 67.6 %
NRBC BLD-RTO: 0 /100 WBCS (ref 0–0)
PLATELET # BLD AUTO: 352 X10*3/UL (ref 150–450)
POTASSIUM SERPL-SCNC: 3.5 MMOL/L (ref 3.5–5.3)
PROT SERPL-MCNC: 7 G/DL (ref 6.4–8.2)
RBC # BLD AUTO: 4.55 X10*6/UL (ref 4–5.2)
SODIUM SERPL-SCNC: 139 MMOL/L (ref 136–145)
WBC # BLD AUTO: 8.2 X10*3/UL (ref 4.4–11.3)

## 2023-11-28 PROCEDURE — 71046 X-RAY EXAM CHEST 2 VIEWS: CPT

## 2023-11-28 PROCEDURE — 99214 OFFICE O/P EST MOD 30 MIN: CPT | Performed by: INTERNAL MEDICINE

## 2023-11-28 PROCEDURE — 99283 EMERGENCY DEPT VISIT LOW MDM: CPT | Mod: 25

## 2023-11-28 PROCEDURE — 85025 COMPLETE CBC W/AUTO DIFF WBC: CPT | Performed by: PHYSICIAN ASSISTANT

## 2023-11-28 PROCEDURE — 80053 COMPREHEN METABOLIC PANEL: CPT | Performed by: PHYSICIAN ASSISTANT

## 2023-11-28 PROCEDURE — 71046 X-RAY EXAM CHEST 2 VIEWS: CPT | Performed by: RADIOLOGY

## 2023-11-28 PROCEDURE — 99284 EMERGENCY DEPT VISIT MOD MDM: CPT | Performed by: EMERGENCY MEDICINE

## 2023-11-28 PROCEDURE — 36415 COLL VENOUS BLD VENIPUNCTURE: CPT | Performed by: PHYSICIAN ASSISTANT

## 2023-11-28 RX ORDER — CETIRIZINE HYDROCHLORIDE 10 MG/1
10 TABLET ORAL DAILY
Qty: 30 TABLET | Refills: 1 | Status: SHIPPED | OUTPATIENT
Start: 2023-11-28 | End: 2023-12-08 | Stop reason: ALTCHOICE

## 2023-11-28 RX ORDER — GUAIFENESIN AND DEXTROMETHORPHAN HYDROBROMIDE 1200; 60 MG/1; MG/1
1 TABLET, EXTENDED RELEASE ORAL EVERY 12 HOURS PRN
Qty: 28 EACH | Refills: 0 | Status: SHIPPED | OUTPATIENT
Start: 2023-11-28 | End: 2023-12-08 | Stop reason: ALTCHOICE

## 2023-11-28 RX ORDER — FLUTICASONE PROPIONATE 50 MCG
1 SPRAY, SUSPENSION (ML) NASAL DAILY
Qty: 16 G | Refills: 1 | Status: SHIPPED | OUTPATIENT
Start: 2023-11-28 | End: 2023-12-08 | Stop reason: ALTCHOICE

## 2023-11-28 ASSESSMENT — COLUMBIA-SUICIDE SEVERITY RATING SCALE - C-SSRS
2. HAVE YOU ACTUALLY HAD ANY THOUGHTS OF KILLING YOURSELF?: NO
1. IN THE PAST MONTH, HAVE YOU WISHED YOU WERE DEAD OR WISHED YOU COULD GO TO SLEEP AND NOT WAKE UP?: NO
6. HAVE YOU EVER DONE ANYTHING, STARTED TO DO ANYTHING, OR PREPARED TO DO ANYTHING TO END YOUR LIFE?: NO

## 2023-11-28 ASSESSMENT — PAIN - FUNCTIONAL ASSESSMENT: PAIN_FUNCTIONAL_ASSESSMENT: 0-10

## 2023-11-28 ASSESSMENT — PAIN SCALES - GENERAL: PAINLEVEL_OUTOF10: 1

## 2023-11-28 ASSESSMENT — PAIN DESCRIPTION - PAIN TYPE: TYPE: ACUTE PAIN

## 2023-11-28 ASSESSMENT — PAIN DESCRIPTION - LOCATION: LOCATION: HEAD

## 2023-11-28 NOTE — ED TRIAGE NOTES
Pt was diagnosed and treated for pneumonia with Augmentin. Pt states symptoms are not improving. Pt started taking antibiotics on Sunday.

## 2023-11-28 NOTE — ED TRIAGE NOTES
TRIAGE NOTE   I saw the patient as the Clinician in Triage and performed a brief history and physical exam, established acuity, and ordered appropriate tests to develop basic plan of care. Patient will be seen by an PATRICK, resident and/or physician who will independently evaluate the patient. Please see subsequent provider notes for further details and disposition.     Brief HPI: In brief, Tsering Carranza is a 43 y.o. female that presents for worsening shortness of breath palpitations dizziness.  3rd ED visits diagnosed pneumonia 2 days ago.  Subsequent second visit for allergic reaction doxycycline which was discontinued.  Patient is currently on Augmentin.  PCP prescribed inhaler Flonase which patient is not yet picked up from the pharmacy.  She is a non-smoker.  No chest pain or pleuritic pain afebrile in triage.    Focused Physical exam:   No distress nontoxic no respiratory difficulty or increased work of breathing.  No tachycardia tachypnea or hypoxia from triage.  Afebrile in triage.  Cardiovascular rate and rhythms.  Lungs auscultation.  Plan/MDM:   Reevaluate treatment for pneumonia.  Labs ordered.    Please see subsequent provider note for further details and disposition

## 2023-11-28 NOTE — PROGRESS NOTES
Subjective   Patient ID: Tsering Carranza is a 43 y.o. female who presents via virtual visit for ER Follow-up.  An interactive audio and video telecommunication system which permits real time communications between the patient (at the originating site) and provider (at the distant site) was utilized to provide this telehealth service.    Verbal consent was requested and obtained from the patient on the day of encounter.    ER Follow-up      Pt presents for an ER follow up for pneumonia. She is still feeling not well. She has headache, sinus congestion, sore throat.  Her first ER visit was for Pneumonia (CAP).  2nd ER visit was from reaction to doxycycline.   Currently on Augmentin (5 day course)  Not feeling much better  Some cough now  ER COVID and home COVID test was negative.    Review of Systems   All other systems reviewed and are negative.        Assessment/Plan     Problem List Items Addressed This Visit    None  Visit Diagnoses       Pneumonia due to infectious organism, unspecified laterality, unspecified part of lung    -  Primary    Relevant Medications    dextromethorphan-guaifenesin (Mucinex DM) 60-1,200 mg tablet extended release 12 hr    cetirizine (ZyrTEC) 10 mg tablet    fluticasone (Flonase) 50 mcg/actuation nasal spray

## 2023-11-29 NOTE — ED PROVIDER NOTES
HPI   Chief Complaint   Patient presents with    Shortness of Breath    Dizziness       This is a 43-year-old female who presents to the emergency department complaining of pneumonia and her heart racing.  The patient states that she was diagnosed with pneumonia 2 days ago.  She was started on Augmentin and doxycycline.  She returned to the emergency department yesterday with an allergic reaction to doxycycline.  This was stopped.  She had a virtual visit with her doctor today and other medications were ordered.  She, however, felt her heart racing today and came to the emergency department for evaluation.  She still feels congested.  She does not have fever.  She has a dry cough.                          No data recorded                Patient History   Past Medical History:   Diagnosis Date    Acute atopic conjunctivitis, bilateral 09/17/2016    Allergic conjunctivitis of both eyes    Acute vaginitis 07/26/2021    Bacterial vaginitis    Acute vaginitis 07/28/2021    Bacterial vaginosis    Allergic rhinitis due to animal (cat) (dog) hair and dander 09/17/2016    Allergic rhinitis due to cats    Allergic rhinitis due to pollen 09/17/2016    Allergic rhinitis due to pollen    Allergy status to unspecified drugs, medicaments and biological substances     History of seasonal allergies    Body mass index (BMI) 38.0-38.9, adult 03/22/2022    BMI 38.0-38.9,adult    Calculus of kidney 06/16/2022    Kidney stone on left side    Elevated blood-pressure reading, without diagnosis of hypertension 11/11/2020    Elevated blood pressure reading    Hypothyroidism, unspecified 01/31/2014    Primary hypothyroidism    Other allergic rhinitis 09/17/2016    Allergic rhinitis due to dust mite    Other symptoms and signs involving the nervous system 02/06/2020    Suspected sleep apnea    Personal history of other diseases of the circulatory system 08/25/2016    History of cardiac murmur    Personal history of other diseases of the  nervous system and sense organs 02/06/2020    History of restless legs syndrome    Personal history of other endocrine, nutritional and metabolic disease 07/21/2022    History of diabetes mellitus    Personal history of other specified conditions 02/22/2018    History of urinary frequency    Personal history of other specified conditions 12/11/2020    History of lump of left breast    Personal history of other specified conditions 09/14/2022    History of snoring    Personal history of other specified conditions 02/08/2014    History of fatigue    Personal history of urinary calculi 03/11/2019    History of renal calculi    Personal history of urinary calculi 05/10/2022    History of renal calculi    Polycystic ovarian syndrome 06/15/2020    PCOS (polycystic ovarian syndrome)    Type 2 diabetes mellitus without complications (CMS/Former[facility]) 08/26/2022    Diet-controlled diabetes mellitus    Urinary tract infection, site not specified 04/01/2022    Acute UTI     Past Surgical History:   Procedure Laterality Date    OTHER SURGICAL HISTORY  05/16/2022    Tonsillectomy    OTHER SURGICAL HISTORY  09/14/2022    Cholecystectomy laparoscopic    OTHER SURGICAL HISTORY  11/20/2020    Lithotripsy     Family History   Problem Relation Name Age of Onset    Cholelithiasis Mother      Hyperlipidemia Mother      Other (PREDIABETES) Mother      Depression Father      Hypertension Father      Asthma Sister      Asthma Daughter      Other (CARDIAC DISORDER) Other GRANDFATHER      Social History     Tobacco Use    Smoking status: Never    Smokeless tobacco: Never   Substance Use Topics    Alcohol use: Never    Drug use: Never       Physical Exam   ED Triage Vitals [11/28/23 1657]   Temp Heart Rate Resp BP   37.3 °C (99.1 °F) 87 18 (!) 171/99      SpO2 Temp Source Heart Rate Source Patient Position   98 % Temporal Monitor Sitting      BP Location FiO2 (%)     Left arm --       Physical Exam  Vitals and nursing note reviewed.   HENT:       Head: Normocephalic and atraumatic.      Nose: Mucosal edema and congestion present.   Eyes:      Conjunctiva/sclera: Conjunctivae normal.   Cardiovascular:      Rate and Rhythm: Normal rate and regular rhythm.      Pulses: Normal pulses.      Heart sounds: Normal heart sounds.   Pulmonary:      Effort: Pulmonary effort is normal.      Breath sounds: Normal breath sounds.   Abdominal:      General: Bowel sounds are normal.      Palpations: Abdomen is soft.   Musculoskeletal:         General: Normal range of motion.      Cervical back: Normal range of motion and neck supple.   Skin:     Findings: No rash.   Neurological:      General: No focal deficit present.      Mental Status: She is alert and oriented to person, place, and time.   Psychiatric:         Mood and Affect: Mood normal.         ED Course & MDM   Diagnoses as of 11/28/23 2023   Pneumonia due to infectious organism, unspecified laterality, unspecified part of lung   Palpitations       Medical Decision Making  Differential diagnosis considered: Arrhythmia, electrolyte abnormality, worsening pneumonia, dehydration    This is a 43-year-old female who presents to the emergency department complaining of palpitations.  This is in the setting of a recently diagnosed pneumonia.  The patient was evaluated with EKG, labs and chest x-ray.  Chest x-ray shows no acute cardiopulmonary process.  Labs showed a normal white blood count of 8.2.  CMP was only abnormal for a mildly elevated glucose of 137.  The patient's EKG was in a sinus rhythm at a heart rate of 91.  These findings were discussed with the patient.  She was instructed to continue medications as prescribed.  She will follow-up with her primary physician.    Amount and/or Complexity of Data Reviewed  ECG/medicine tests: independent interpretation performed.     Details: Normal sinus rhythm, heart rate 91, normal axis, heart rate improved but no other change compared to  11/26/2023.        Procedure  Procedures     Florin Nicole MD  11/28/23 2029

## 2023-11-30 ENCOUNTER — APPOINTMENT (OUTPATIENT)
Dept: OBSTETRICS AND GYNECOLOGY | Facility: CLINIC | Age: 43
End: 2023-11-30
Payer: COMMERCIAL

## 2023-12-01 NOTE — PROGRESS NOTES
Physical Therapy    Discharge Summary    Name: Tsering Carranza  MRN: [id number]  : 1980  Date: 23    Discharge Summary: PT    Discharge Information: Date of discharge 23, Date of last visit 10-3-23, Date of evaluation 23, Number of attended visits 2, Referred by Dr. Martinez, and Referred for muscle spasm.    Discharge Status: unknown     Rehab Discharge Reason: Achieved all and/or the most significant goals(s)-

## 2023-12-04 ENCOUNTER — OFFICE VISIT (OUTPATIENT)
Dept: OBSTETRICS AND GYNECOLOGY | Facility: CLINIC | Age: 43
End: 2023-12-04
Payer: COMMERCIAL

## 2023-12-04 VITALS
DIASTOLIC BLOOD PRESSURE: 82 MMHG | SYSTOLIC BLOOD PRESSURE: 120 MMHG | WEIGHT: 214 LBS | HEIGHT: 63 IN | BODY MASS INDEX: 37.92 KG/M2

## 2023-12-04 DIAGNOSIS — R10.2 PELVIC PAIN: Primary | ICD-10-CM

## 2023-12-04 PROCEDURE — 99213 OFFICE O/P EST LOW 20 MIN: CPT

## 2023-12-04 PROCEDURE — 1036F TOBACCO NON-USER: CPT

## 2023-12-04 PROCEDURE — 3079F DIAST BP 80-89 MM HG: CPT

## 2023-12-04 PROCEDURE — 3008F BODY MASS INDEX DOCD: CPT

## 2023-12-04 PROCEDURE — 3074F SYST BP LT 130 MM HG: CPT

## 2023-12-04 ASSESSMENT — PAIN SCALES - GENERAL: PAINLEVEL: 3

## 2023-12-04 NOTE — PROGRESS NOTES
"Assessment/Plan   Diagnoses and all orders for this visit:  Pelvic pain    Discussed reasoning for prescription of Lotrisone, patient verbalizes understanding.   Encouraged patient to follow up with treatment given by  NATALIE Izquierdo. Patient is agreeable to try treatment.   Discussed referral to physicians for ongoing pelvic pain and encouraged use of one provider.   Mari АЛЕКСАНДР Osman     Subjective   Tsering Carranza is a 43 y.o. female who is here for concerns about pelvic pain and vaginal discharge. Patient reports no itching, lesions, ordor, rash, or vaginal bleeding. She reports that the pain is mild, 3/10, and chronic.  Patient saw NATALIE Glez on 10/17/23 for the same concerns and was prescribed vaginal estrogen cream but did not feel that it helped her symptoms. Patient also saw Saba ESTRADA on 11/6/23 for the same symptoms and was prescribed clotrimazole-betamethasone (Lotrisone) cream which patient reports she did not take.      Review of Systems:   Positive for frequency, pelvic pain, and vaginal discharge.     Objective   /82   Ht 1.6 m (5' 3\")   Wt 97.1 kg (214 lb)   LMP 11/23/2023 (Approximate)   BMI 37.91 kg/m²     "

## 2023-12-05 ENCOUNTER — TELEPHONE (OUTPATIENT)
Dept: PHYSICAL THERAPY | Facility: CLINIC | Age: 43
End: 2023-12-05
Payer: COMMERCIAL

## 2023-12-06 ENCOUNTER — HOSPITAL ENCOUNTER (EMERGENCY)
Facility: HOSPITAL | Age: 43
Discharge: HOME | End: 2023-12-06
Attending: STUDENT IN AN ORGANIZED HEALTH CARE EDUCATION/TRAINING PROGRAM
Payer: COMMERCIAL

## 2023-12-06 ENCOUNTER — APPOINTMENT (OUTPATIENT)
Dept: OBSTETRICS AND GYNECOLOGY | Facility: CLINIC | Age: 43
End: 2023-12-06
Payer: COMMERCIAL

## 2023-12-06 ENCOUNTER — APPOINTMENT (OUTPATIENT)
Dept: RADIOLOGY | Facility: HOSPITAL | Age: 43
End: 2023-12-06
Payer: COMMERCIAL

## 2023-12-06 VITALS
TEMPERATURE: 99.1 F | HEART RATE: 76 BPM | BODY MASS INDEX: 37.21 KG/M2 | OXYGEN SATURATION: 98 % | DIASTOLIC BLOOD PRESSURE: 78 MMHG | WEIGHT: 210 LBS | SYSTOLIC BLOOD PRESSURE: 153 MMHG | RESPIRATION RATE: 14 BRPM | HEIGHT: 63 IN

## 2023-12-06 DIAGNOSIS — R10.2 PELVIC PAIN: Primary | ICD-10-CM

## 2023-12-06 DIAGNOSIS — N20.0 NEPHROLITHIASIS: ICD-10-CM

## 2023-12-06 LAB
ALBUMIN SERPL BCP-MCNC: 4 G/DL (ref 3.4–5)
ALP SERPL-CCNC: 63 U/L (ref 33–110)
ALT SERPL W P-5'-P-CCNC: 20 U/L (ref 7–45)
ANION GAP SERPL CALC-SCNC: 12 MMOL/L (ref 10–20)
APPEARANCE UR: CLEAR
AST SERPL W P-5'-P-CCNC: 21 U/L (ref 9–39)
B-HCG SERPL-ACNC: <2 MIU/ML
BASOPHILS # BLD AUTO: 0.05 X10*3/UL (ref 0–0.1)
BASOPHILS NFR BLD AUTO: 0.5 %
BILIRUB SERPL-MCNC: 0.3 MG/DL (ref 0–1.2)
BILIRUB UR STRIP.AUTO-MCNC: NEGATIVE MG/DL
BUN SERPL-MCNC: 11 MG/DL (ref 6–23)
CALCIUM SERPL-MCNC: 8.6 MG/DL (ref 8.6–10.3)
CAOX CRY #/AREA UR COMP ASSIST: ABNORMAL /HPF
CHLORIDE SERPL-SCNC: 105 MMOL/L (ref 98–107)
CO2 SERPL-SCNC: 26 MMOL/L (ref 21–32)
COLOR UR: YELLOW
CREAT SERPL-MCNC: 0.73 MG/DL (ref 0.5–1.05)
EOSINOPHIL # BLD AUTO: 0.12 X10*3/UL (ref 0–0.7)
EOSINOPHIL NFR BLD AUTO: 1.3 %
ERYTHROCYTE [DISTWIDTH] IN BLOOD BY AUTOMATED COUNT: 12.6 % (ref 11.5–14.5)
GFR SERPL CREATININE-BSD FRML MDRD: >90 ML/MIN/1.73M*2
GLUCOSE SERPL-MCNC: 126 MG/DL (ref 74–99)
GLUCOSE UR STRIP.AUTO-MCNC: NEGATIVE MG/DL
HCT VFR BLD AUTO: 37.2 % (ref 36–46)
HGB BLD-MCNC: 12.5 G/DL (ref 12–16)
IMM GRANULOCYTES # BLD AUTO: 0.03 X10*3/UL (ref 0–0.7)
IMM GRANULOCYTES NFR BLD AUTO: 0.3 % (ref 0–0.9)
KETONES UR STRIP.AUTO-MCNC: NEGATIVE MG/DL
LEUKOCYTE ESTERASE UR QL STRIP.AUTO: NEGATIVE
LYMPHOCYTES # BLD AUTO: 2.68 X10*3/UL (ref 1.2–4.8)
LYMPHOCYTES NFR BLD AUTO: 28.8 %
MCH RBC QN AUTO: 28.9 PG (ref 26–34)
MCHC RBC AUTO-ENTMCNC: 33.6 G/DL (ref 32–36)
MCV RBC AUTO: 86 FL (ref 80–100)
MONOCYTES # BLD AUTO: 0.28 X10*3/UL (ref 0.1–1)
MONOCYTES NFR BLD AUTO: 3 %
MUCOUS THREADS #/AREA URNS AUTO: ABNORMAL /LPF
NEUTROPHILS # BLD AUTO: 6.14 X10*3/UL (ref 1.2–7.7)
NEUTROPHILS NFR BLD AUTO: 66.1 %
NITRITE UR QL STRIP.AUTO: NEGATIVE
NRBC BLD-RTO: 0 /100 WBCS (ref 0–0)
PH UR STRIP.AUTO: 5 [PH]
PLATELET # BLD AUTO: 339 X10*3/UL (ref 150–450)
POTASSIUM SERPL-SCNC: 4.1 MMOL/L (ref 3.5–5.3)
PROT SERPL-MCNC: 7.1 G/DL (ref 6.4–8.2)
PROT UR STRIP.AUTO-MCNC: NEGATIVE MG/DL
RBC # BLD AUTO: 4.32 X10*6/UL (ref 4–5.2)
RBC # UR STRIP.AUTO: ABNORMAL /UL
RBC #/AREA URNS AUTO: ABNORMAL /HPF
SODIUM SERPL-SCNC: 139 MMOL/L (ref 136–145)
SP GR UR STRIP.AUTO: 1.02
UROBILINOGEN UR STRIP.AUTO-MCNC: <2 MG/DL
WBC # BLD AUTO: 9.3 X10*3/UL (ref 4.4–11.3)
WBC #/AREA URNS AUTO: ABNORMAL /HPF

## 2023-12-06 PROCEDURE — 80053 COMPREHEN METABOLIC PANEL: CPT | Performed by: EMERGENCY MEDICINE

## 2023-12-06 PROCEDURE — 2550000001 HC RX 255 CONTRASTS: Performed by: STUDENT IN AN ORGANIZED HEALTH CARE EDUCATION/TRAINING PROGRAM

## 2023-12-06 PROCEDURE — 85025 COMPLETE CBC W/AUTO DIFF WBC: CPT | Performed by: EMERGENCY MEDICINE

## 2023-12-06 PROCEDURE — 84702 CHORIONIC GONADOTROPIN TEST: CPT | Performed by: EMERGENCY MEDICINE

## 2023-12-06 PROCEDURE — 81001 URINALYSIS AUTO W/SCOPE: CPT | Performed by: EMERGENCY MEDICINE

## 2023-12-06 PROCEDURE — 36415 COLL VENOUS BLD VENIPUNCTURE: CPT | Performed by: EMERGENCY MEDICINE

## 2023-12-06 PROCEDURE — 74177 CT ABD & PELVIS W/CONTRAST: CPT

## 2023-12-06 PROCEDURE — 74177 CT ABD & PELVIS W/CONTRAST: CPT | Mod: FOREIGN READ | Performed by: RADIOLOGY

## 2023-12-06 PROCEDURE — 99284 EMERGENCY DEPT VISIT MOD MDM: CPT | Performed by: STUDENT IN AN ORGANIZED HEALTH CARE EDUCATION/TRAINING PROGRAM

## 2023-12-06 RX ADMIN — IOHEXOL 75 ML: 350 INJECTION, SOLUTION INTRAVENOUS at 16:55

## 2023-12-06 ASSESSMENT — PAIN DESCRIPTION - LOCATION: LOCATION: PELVIS

## 2023-12-06 ASSESSMENT — PAIN - FUNCTIONAL ASSESSMENT: PAIN_FUNCTIONAL_ASSESSMENT: 0-10

## 2023-12-06 ASSESSMENT — COLUMBIA-SUICIDE SEVERITY RATING SCALE - C-SSRS
6. HAVE YOU EVER DONE ANYTHING, STARTED TO DO ANYTHING, OR PREPARED TO DO ANYTHING TO END YOUR LIFE?: NO
2. HAVE YOU ACTUALLY HAD ANY THOUGHTS OF KILLING YOURSELF?: NO
1. IN THE PAST MONTH, HAVE YOU WISHED YOU WERE DEAD OR WISHED YOU COULD GO TO SLEEP AND NOT WAKE UP?: NO

## 2023-12-06 ASSESSMENT — PAIN SCALES - GENERAL
PAINLEVEL_OUTOF10: 6
PAINLEVEL_OUTOF10: 6
PAINLEVEL_OUTOF10: 3
PAINLEVEL_OUTOF10: 0 - NO PAIN
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
## 2023-12-06 NOTE — ED TRIAGE NOTES
Pt to triage d/t pelvic pain, sharp and ache/tight pressure.      HX Kidney stones, pneumonia, antibiotics ended last week, DM
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
## 2023-12-06 NOTE — DISCHARGE INSTRUCTIONS
You have been seen at a [facility].  Please follow-up with your primary care provider in the next 1 to 2 days for further evaluation and routine follow-up.  Please return to the emergency room if having any worsening symptoms.  Please follow-up with any specialists if discussed during your emergency room stay.

## 2023-12-08 ENCOUNTER — TELEPHONE (OUTPATIENT)
Dept: HEMATOLOGY/ONCOLOGY | Facility: CLINIC | Age: 43
End: 2023-12-08

## 2023-12-08 ENCOUNTER — NURSE TRIAGE (OUTPATIENT)
Dept: ADMISSION | Facility: HOSPITAL | Age: 43
End: 2023-12-08

## 2023-12-08 ENCOUNTER — TELEMEDICINE (OUTPATIENT)
Dept: OBSTETRICS AND GYNECOLOGY | Facility: CLINIC | Age: 43
End: 2023-12-08
Payer: COMMERCIAL

## 2023-12-08 DIAGNOSIS — R10.2 PELVIC PAIN: Primary | ICD-10-CM

## 2023-12-08 PROCEDURE — 99212 OFFICE O/P EST SF 10 MIN: CPT | Performed by: NURSE PRACTITIONER

## 2023-12-08 NOTE — TELEPHONE ENCOUNTER
Pt aware that per Mere Beckman, to monitor for now, call our office or go to ER if symptoms worsen or new symptoms develop, aware that she can take tylenol or NSAIDS for the pain. This was all left on her VM, also left that if she develops SOB, unbearable headache, irregular heart beat, to go straight to an ER. I encouraged her to call our office back with any further questions or concerns.

## 2023-12-08 NOTE — PROGRESS NOTES
Subjective   Patient ID: Tsering Carranza is a 43 y.o. female who presents for Follow-up (Virtual visit).  HPI  Here for follow-up for pelvic pain. She was recently seen in the ER. Was found to have kidney stones and was also diagnosed with pneumonia. She has an upcoming appointment with urology and additionally with a uro-gynecologist.   States mostly her pelvic pain is unchanged. She continues to have dull, crampy pain. Her vaginal discharge continues. She did not pickup the topical cream that was sent in for her. She plans to start this now.    Review of Systems   Genitourinary:  Positive for pelvic pain.   All other systems reviewed and are negative.      Objective   Physical Exam  Constitutional:       Appearance: Normal appearance.   Pulmonary:      Effort: Pulmonary effort is normal.   Neurological:      Mental Status: She is alert.   Psychiatric:         Mood and Affect: Mood normal.         Behavior: Behavior normal.         Assessment/Plan   Diagnoses and all orders for this visit:  Pelvic pain  Follow-up with urology and urology- gynecology.   Follow-up with this office as needed.        NATALIE Nelson 12/08/23 1:24 PM
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
## 2023-12-11 ENCOUNTER — APPOINTMENT (OUTPATIENT)
Dept: NUTRITION | Facility: CLINIC | Age: 43
End: 2023-12-11
Payer: COMMERCIAL
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
## 2023-12-13 ENCOUNTER — TELEMEDICINE CLINICAL SUPPORT (OUTPATIENT)
Dept: NUTRITION | Facility: CLINIC | Age: 43
End: 2023-12-13
Payer: COMMERCIAL

## 2023-12-13 VITALS — BODY MASS INDEX: 37.2 KG/M2 | HEIGHT: 63 IN

## 2023-12-13 DIAGNOSIS — E78.5 HYPERLIPIDEMIA, UNSPECIFIED: ICD-10-CM

## 2023-12-13 DIAGNOSIS — E66.9 OBESITY (BMI 30.0-34.9): ICD-10-CM

## 2023-12-13 DIAGNOSIS — R39.9 UNSPECIFIED SYMPTOMS AND SIGNS INVOLVING THE GENITOURINARY SYSTEM: ICD-10-CM

## 2023-12-13 DIAGNOSIS — E78.2 MIXED HYPERLIPIDEMIA: Primary | ICD-10-CM

## 2023-12-13 PROCEDURE — 97802 MEDICAL NUTRITION INDIV IN: CPT | Mod: GT | Performed by: INTERNAL MEDICINE

## 2023-12-13 PROCEDURE — 97802 MEDICAL NUTRITION INDIV IN: CPT | Performed by: INTERNAL MEDICINE

## 2023-12-22 ENCOUNTER — TREATMENT (OUTPATIENT)
Dept: PHYSICAL THERAPY | Facility: CLINIC | Age: 43
End: 2023-12-22
Payer: COMMERCIAL

## 2023-12-22 DIAGNOSIS — G43.809 VESTIBULAR MIGRAINE: ICD-10-CM

## 2023-12-22 DIAGNOSIS — F07.81 POST-CONCUSSION SYNDROME: ICD-10-CM

## 2023-12-22 DIAGNOSIS — M54.2 CERVICALGIA: Primary | ICD-10-CM

## 2023-12-22 PROCEDURE — 97542 WHEELCHAIR MNGMENT TRAINING: CPT | Mod: GP

## 2023-12-22 PROCEDURE — 97124 MASSAGE THERAPY: CPT | Mod: GP

## 2023-12-22 PROCEDURE — 97035 APP MDLTY 1+ULTRASOUND EA 15: CPT | Mod: GP

## 2023-12-22 PROCEDURE — 97140 MANUAL THERAPY 1/> REGIONS: CPT | Mod: GP

## 2023-12-22 PROCEDURE — 97110 THERAPEUTIC EXERCISES: CPT | Mod: GP

## 2023-12-22 NOTE — PROGRESS NOTES
Physical Therapy      Physical Therapy Treatment    Patient Name: Tsering Carranza  MRN: [id number]  Today's Date: 12/22/23  Visit: 9/9  Auth date: 11/28/23-12/31/23    SUBJECTIVE:  Patient reports more fatigue since last time.   Caught pneumonia around Thanksgiving.  Has had a little more dizziness in the last week.  Dizziness is somewhat constant.  Still feels mucous/congestion.  Still working full time but has been busier at work lately.    OBJECTIVE:  PCS pre= 68  Tenderness over proximal insertion of bilat Uts/suboccipitals    PO2= 97% on treadmill    TREATMENT:  Therex:  Rosendo Treadmill Training:  -able to walk up to 1.6 mph  -stopped at 10 min    Manual Therapy:  Gentle manual cervical stretching- rotation and SB  Suboccipital release and gentle manual traction    Modalities:  US to bilat UT x 8 min at 3 MHz cont, 1.5 w/cm2    ASSESSMENT:  Patient with increased sx lately, possibly due to recent illness and stress.  Would benefit from continued PT but unable due to insurance restrictions.    PLAN:   DC due to insurance restricitions.

## 2023-12-28 ENCOUNTER — APPOINTMENT (OUTPATIENT)
Dept: PULMONOLOGY | Facility: CLINIC | Age: 43
End: 2023-12-28
Payer: COMMERCIAL

## 2024-01-02 SDOH — ECONOMIC STABILITY: FOOD INSECURITY: WITHIN THE PAST 12 MONTHS, THE FOOD YOU BOUGHT JUST DIDN'T LAST AND YOU DIDN'T HAVE MONEY TO GET MORE.: NEVER TRUE

## 2024-01-02 SDOH — SOCIAL STABILITY: SOCIAL NETWORK: I HAVE TROUBLE DOING ALL OF THE ACTIVITIES WITH FRIENDS THAT I WANT TO DO: SOMETIMES

## 2024-01-02 SDOH — ECONOMIC STABILITY: FOOD INSECURITY: WITHIN THE PAST 12 MONTHS, YOU WORRIED THAT YOUR FOOD WOULD RUN OUT BEFORE YOU GOT MONEY TO BUY MORE.: NEVER TRUE

## 2024-01-02 SDOH — SOCIAL STABILITY: SOCIAL NETWORK

## 2024-01-02 SDOH — SOCIAL STABILITY: SOCIAL NETWORK: I HAVE TROUBLE DOING ALL OF MY REGULAR LEISURE ACTIVITIES WITH OTHERS: SOMETIMES

## 2024-01-02 SDOH — SOCIAL STABILITY: SOCIAL NETWORK: I HAVE TROUBLE DOING ALL OF THE FAMILY ACTIVITIES THAT I WANT TO DO: SOMETIMES

## 2024-01-02 SDOH — SOCIAL STABILITY: SOCIAL NETWORK: I HAVE TROUBLE DOING ALL OF MY USUAL WORK (INCLUDE WORK AT HOME): SOMETIMES

## 2024-01-02 SDOH — SOCIAL STABILITY: SOCIAL NETWORK: PROMIS ABILITY TO PARTICIPATE IN SOCIAL ROLES & ACTIVITIES T-SCORE: 45

## 2024-01-02 ASSESSMENT — ANXIETY QUESTIONNAIRES
PAST MONTH HOW OFTEN HAVE YOU FELT THAT YOU WERE UNABLE TO CONTROL THE IMPORTANT THINGS IN YOUR LIFE: 3 - FAIRLY OFTEN
PAST MONTH HOW OFTEN HAVE YOU FELT THAT YOU WERE UNABLE TO CONTROL THE IMPORTANT THINGS IN YOUR LIFE: 3 - FAIRLY OFTEN
PAST MONTH HOW OFTEN HAVE YOU FELT THAT THINGS WERE GOING YOUR WAY: 1 - ALMOST NEVER
PAST MONTH HOW OFTEN HAVE YOU FELT CONFIDENT ABOUT YOUR ABILITY TO HANDLE YOUR PROBLEMS: 2 - SOMETIMES
PAST MONTH HOW OFTEN HAVE YOU FELT CONFIDENT ABOUT YOUR ABILITY TO HANDLE YOUR PROBLEMS: 2 - SOMETIMES
PAST MONTH HOW OFTEN HAVE YOU FELT DIFFICULTIES WERE PILING UP SO HIGH THAT YOU COULD NOT OVERCOME THEM: 2 - SOMETIMES

## 2024-01-02 ASSESSMENT — PROMIS GLOBAL HEALTH SCALE
RATE_AVERAGE_FATIGUE: VERY SEVERE
CARRYOUT_PHYSICAL_ACTIVITIES: MODERATELY
RATE_QUALITY_OF_LIFE: FAIR
CARRYOUT_SOCIAL_ACTIVITIES: GOOD
RATE_GENERAL_HEALTH: FAIR
RATE_SOCIAL_SATISFACTION: POOR
RATE_AVERAGE_PAIN: 3
RATE_PHYSICAL_HEALTH: FAIR
EMOTIONAL_PROBLEMS: ALWAYS
RATE_MENTAL_HEALTH: POOR

## 2024-01-03 ENCOUNTER — APPOINTMENT (OUTPATIENT)
Dept: NUTRITION | Facility: CLINIC | Age: 44
End: 2024-01-03
Payer: COMMERCIAL

## 2024-01-04 ENCOUNTER — ALLIED HEALTH (OUTPATIENT)
Dept: INTEGRATIVE MEDICINE | Facility: CLINIC | Age: 44
End: 2024-01-04
Payer: COMMERCIAL

## 2024-01-04 DIAGNOSIS — Z72.820 POOR SLEEP: ICD-10-CM

## 2024-01-04 DIAGNOSIS — G89.29 OTHER CHRONIC PAIN: ICD-10-CM

## 2024-01-04 DIAGNOSIS — R09.81 SINUS CONGESTION: ICD-10-CM

## 2024-01-04 DIAGNOSIS — R79.89 LOW VITAMIN D LEVEL: Primary | ICD-10-CM

## 2024-01-04 DIAGNOSIS — E78.2 MIXED HYPERLIPIDEMIA: ICD-10-CM

## 2024-01-04 DIAGNOSIS — Z71.3 NUTRITIONAL COUNSELING: ICD-10-CM

## 2024-01-04 DIAGNOSIS — F32.A ANXIETY AND DEPRESSION: ICD-10-CM

## 2024-01-04 DIAGNOSIS — R53.83 FATIGUE, UNSPECIFIED TYPE: ICD-10-CM

## 2024-01-04 DIAGNOSIS — F41.9 ANXIETY AND DEPRESSION: ICD-10-CM

## 2024-01-04 PROCEDURE — 99417 PROLNG OP E/M EACH 15 MIN: CPT | Performed by: PHYSICIAN ASSISTANT

## 2024-01-04 PROCEDURE — 99215 OFFICE O/P EST HI 40 MIN: CPT | Performed by: PHYSICIAN ASSISTANT
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
## 2024-01-04 NOTE — PATIENT INSTRUCTIONS
Complete FASTING labs prior to next visit  Referral to acupuncture  Sinus congestion:  Add humidifier in your room  Use NeilMed Sinus rinse as instructed on the package   Work on sleep optimization:  Create a wind down routine:  1 hour before bed, turn off screens and reduce house lighting  Take a bath or shower  Read a book x 15 minutes  Avoid doing anything else in the bed space beside sleep  See Sleep Hygiene handout and try to follow all recommendations.  Nutrition:  Keep up the good work with hydration!  Goal of improving microbiome diversity and reducing inflammation  Add in 2-3 servings of vegetables per day (increase as tolerated)  Frozen vegetables- edamame, squash, vegetable medley  Roasted vegetables- beets, asparagus, sweet potatoes   Supplements: (sent via [employer])   Take vitamin D and iron daily as previously prescribed  Probiotic  Omega 3  Resources:  Sleep Hygiene  Gut Health to Whole Health (for review--will discuss further at next visit)

## 2024-01-17 ENCOUNTER — APPOINTMENT (OUTPATIENT)
Dept: NUTRITION | Facility: CLINIC | Age: 44
End: 2024-01-17
Payer: COMMERCIAL

## 2024-01-18 ENCOUNTER — LAB (OUTPATIENT)
Dept: LAB | Facility: LAB | Age: 44
End: 2024-01-18
Payer: COMMERCIAL

## 2024-01-18 ENCOUNTER — OFFICE VISIT (OUTPATIENT)
Dept: OBSTETRICS AND GYNECOLOGY | Facility: CLINIC | Age: 44
End: 2024-01-18
Payer: COMMERCIAL

## 2024-01-18 VITALS
WEIGHT: 220 LBS | DIASTOLIC BLOOD PRESSURE: 94 MMHG | HEART RATE: 73 BPM | HEIGHT: 63 IN | SYSTOLIC BLOOD PRESSURE: 151 MMHG | BODY MASS INDEX: 38.98 KG/M2

## 2024-01-18 DIAGNOSIS — N94.89 UTERINE PAIN: ICD-10-CM

## 2024-01-18 DIAGNOSIS — R10.2 PELVIC PAIN: Primary | ICD-10-CM

## 2024-01-18 LAB
BUN SERPL-MCNC: 12 MG/DL (ref 6–23)
CREAT SERPL-MCNC: 0.73 MG/DL (ref 0.5–1.05)
EGFRCR SERPLBLD CKD-EPI 2021: >90 ML/MIN/1.73M*2

## 2024-01-18 PROCEDURE — 1036F TOBACCO NON-USER: CPT | Performed by: OBSTETRICS & GYNECOLOGY

## 2024-01-18 PROCEDURE — 84520 ASSAY OF UREA NITROGEN: CPT

## 2024-01-18 PROCEDURE — 3008F BODY MASS INDEX DOCD: CPT | Performed by: OBSTETRICS & GYNECOLOGY

## 2024-01-18 PROCEDURE — 3080F DIAST BP >= 90 MM HG: CPT | Performed by: OBSTETRICS & GYNECOLOGY

## 2024-01-18 PROCEDURE — 36415 COLL VENOUS BLD VENIPUNCTURE: CPT

## 2024-01-18 PROCEDURE — 82565 ASSAY OF CREATININE: CPT

## 2024-01-18 PROCEDURE — 99214 OFFICE O/P EST MOD 30 MIN: CPT | Performed by: OBSTETRICS & GYNECOLOGY

## 2024-01-18 PROCEDURE — 3077F SYST BP >= 140 MM HG: CPT | Performed by: OBSTETRICS & GYNECOLOGY

## 2024-01-18 ASSESSMENT — PAIN SCALES - GENERAL: PAINLEVEL: 2
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
## 2024-01-19 ENCOUNTER — APPOINTMENT (OUTPATIENT)
Dept: OBSTETRICS AND GYNECOLOGY | Facility: CLINIC | Age: 44
End: 2024-01-19
Payer: COMMERCIAL

## 2024-01-30 ENCOUNTER — HOSPITAL ENCOUNTER (OUTPATIENT)
Dept: RADIOLOGY | Facility: CLINIC | Age: 44
Discharge: HOME | End: 2024-01-30
Payer: COMMERCIAL

## 2024-01-30 ENCOUNTER — OFFICE VISIT (OUTPATIENT)
Dept: ORTHOPEDIC SURGERY | Facility: CLINIC | Age: 44
End: 2024-01-30
Payer: COMMERCIAL

## 2024-01-30 DIAGNOSIS — M25.571 RIGHT ANKLE PAIN, UNSPECIFIED CHRONICITY: ICD-10-CM

## 2024-01-30 DIAGNOSIS — M76.821 POSTERIOR TIBIAL TENDON DYSFUNCTION, RIGHT: ICD-10-CM

## 2024-01-30 DIAGNOSIS — M76.821 POSTERIOR TIBIAL TENDINITIS, RIGHT: Primary | ICD-10-CM

## 2024-01-30 PROCEDURE — 73610 X-RAY EXAM OF ANKLE: CPT | Mod: RIGHT SIDE | Performed by: RADIOLOGY

## 2024-01-30 PROCEDURE — 99214 OFFICE O/P EST MOD 30 MIN: CPT | Performed by: ORTHOPAEDIC SURGERY

## 2024-01-30 PROCEDURE — 73610 X-RAY EXAM OF ANKLE: CPT | Mod: RT

## 2024-01-30 PROCEDURE — 1036F TOBACCO NON-USER: CPT | Performed by: ORTHOPAEDIC SURGERY

## 2024-01-30 PROCEDURE — 3008F BODY MASS INDEX DOCD: CPT | Performed by: ORTHOPAEDIC SURGERY

## 2024-01-30 NOTE — PROGRESS NOTES
HISTORY OF PRESENT ILLNESS  This is a pleasant 43 y.o. year old female  who presents on 01/30/2024 at the request of Mayela Torres DO for evaluation of  right ankle  pain that has been present over/since  few weeks .    How the condition occurred or started: started a new exercise program to get in shape on U tube that involved a lot of stepping  Location of pain (patient points to): posteromedial ankle  Quality of pain: Mild, was moderate but improving over the last few days so not bothersome now with walking  Associated Signs and symptoms: no N/T, swelling  Previous Treatment: rest, wearing shoes with good arch support    PHYSICAL EXAMINATION  Constitutional Exam: patient's height and weight reviewed, well-kempt  Psychiatric Exam: alert and oriented x 3, appropriate mood and behavior  Eye Exam: FLOR, EOMI  Pulmonary Exam: breathing non-labored, no apparent distress  Lymphatic exam: no appreciable lymphadenopathy in the lower extremities  Cardiovascular exam: DP pulses 2+ bilaterally, PT 2+ bilaterally, toes are pink with good capillary refill, no pitting edema  Skin exam: no open lesions, rashes, abrasions or ulcerations  Neurological exam: sensation to light touch intact in both lower extremities in peripheral and dermatomal distributions (except for any abnormalities noted in musculoskeletal exam)    Musculoskeletal exam: right foot and ankle: normal pes planus alignment, sore over posterior tibial tendon with some tenosynovitis, stable ligamentous exam, no plantar fascial pain, neg tinel's at tarsal tunnel, able to invert foot past midline, motor intact, mild achilles tightness    DATA/RESULTS REVIEW: I personally reviewed the patient's x-ray images and reports of the  right ankle shows no arthritis, normal aligment, no fractures or acute findings .     ASSESSMENT: right posterior tibial tendinitis, achilles tightness  PLAN: I discussed with the patient the differential diagnosis, complex overuse related  "nature of the condition(s) and available treatment option(s).  Discussed with her that not being on exercise routine and then abruptly starting a more high impact step type class likely caused tendon to have tendinitis due to overload.  She has normal arch alignment and so should do well with conservative treatment.  Discussed boot brace but she feels her pain is much better now, none with walking.  She will use compression stockings to help with swelling (has them from hx of left DVT).  PT protocol written out for the physical therapist and rx given to patient.  She was given information on spenco insert to use in her shoes for 6-12 weeks to help the tendon.  Discussed some ideas on how to start and progress her exercise program.  FUV prn or if she has any concerns or questions.  The patient's questions were answered in detail.      Note dictated with [employer] software, completed without full type editing to avoid delay.      Dear Referring Physician,  It was my pleasure to see your patient, in the office today.  Please refer to the detailed notes above for my findings and recommendations.  Thank you once again for your consultation request.  If you have any further questions or concerns, please feel free to contact me via email or at the phone number and address below.  Sincerely,    Elzbieta Alford MD   of Orthopedic Surgery, [employer] [facility] [employer]  Dual Fellowship-trained in Orthopedic Sports Medicine (Knee and Shoulder), and Foot and Ankle Surgery  [facility] [place]   [employer] Subspecialty Certificate in Orthopedic Sports Medicine  Head Team Physician Case \"[place]\" and [facility] \"[facility]\"  Team [employer] OP Physician [id number] Paralympic Games  Team [employer] US Figure Skating Medical Practitioner, including International Event Coverage  Director, Foot and Ankle Division, Department of Orthopedics    [facility] " [address], [place] [id number]  [email or web address]  Office [phone]

## 2024-01-31 ENCOUNTER — APPOINTMENT (OUTPATIENT)
Dept: INTEGRATIVE MEDICINE | Facility: CLINIC | Age: 44
End: 2024-01-31
Payer: COMMERCIAL

## 2024-02-01 ENCOUNTER — APPOINTMENT (OUTPATIENT)
Dept: OBSTETRICS AND GYNECOLOGY | Facility: CLINIC | Age: 44
End: 2024-02-01
Payer: COMMERCIAL

## 2024-02-01 ENCOUNTER — APPOINTMENT (OUTPATIENT)
Dept: INTEGRATIVE MEDICINE | Facility: CLINIC | Age: 44
End: 2024-02-01
Payer: COMMERCIAL

## 2024-02-05 ENCOUNTER — APPOINTMENT (OUTPATIENT)
Dept: INTEGRATIVE MEDICINE | Facility: CLINIC | Age: 44
End: 2024-02-05
Payer: COMMERCIAL

## 2024-02-08 ENCOUNTER — APPOINTMENT (OUTPATIENT)
Dept: INTEGRATIVE MEDICINE | Facility: CLINIC | Age: 44
End: 2024-02-08
Payer: COMMERCIAL

## 2024-02-09 ENCOUNTER — APPOINTMENT (OUTPATIENT)
Dept: RADIOLOGY | Facility: CLINIC | Age: 44
End: 2024-02-09
Payer: COMMERCIAL

## 2024-02-14 ENCOUNTER — TELEPHONE (OUTPATIENT)
Dept: OBSTETRICS AND GYNECOLOGY | Facility: CLINIC | Age: 44
End: 2024-02-14
Payer: COMMERCIAL

## 2024-02-14 DIAGNOSIS — N94.89 UTERINE PAIN: ICD-10-CM

## 2024-02-14 NOTE — TELEPHONE ENCOUNTER
MRI called and radiologist recommends adding MRI pelvis. Order pended to [facility].

## 2024-02-15 ENCOUNTER — APPOINTMENT (OUTPATIENT)
Dept: OBSTETRICS AND GYNECOLOGY | Facility: CLINIC | Age: 44
End: 2024-02-15
Payer: COMMERCIAL

## 2024-02-16 ENCOUNTER — HOSPITAL ENCOUNTER (OUTPATIENT)
Dept: RADIOLOGY | Facility: CLINIC | Age: 44
End: 2024-02-16
Payer: COMMERCIAL

## 2024-03-04 ENCOUNTER — APPOINTMENT (OUTPATIENT)
Dept: OBSTETRICS AND GYNECOLOGY | Facility: CLINIC | Age: 44
End: 2024-03-04
Payer: COMMERCIAL

## 2024-03-14 ENCOUNTER — APPOINTMENT (OUTPATIENT)
Dept: OPHTHALMOLOGY | Facility: CLINIC | Age: 44
End: 2024-03-14
Payer: COMMERCIAL

## 2024-03-26 ENCOUNTER — HOSPITAL ENCOUNTER (OUTPATIENT)
Dept: RADIOLOGY | Facility: CLINIC | Age: 44
Discharge: HOME | End: 2024-03-26
Payer: COMMERCIAL

## 2024-03-26 DIAGNOSIS — N94.89 UTERINE PAIN: ICD-10-CM

## 2024-03-26 PROCEDURE — 72197 MRI PELVIS W/O & W/DYE: CPT

## 2024-03-26 PROCEDURE — 72197 MRI PELVIS W/O & W/DYE: CPT | Performed by: RADIOLOGY

## 2024-03-26 PROCEDURE — A9575 INJ GADOTERATE MEGLUMI 0.1ML: HCPCS | Mod: SE | Performed by: OBSTETRICS & GYNECOLOGY

## 2024-03-26 PROCEDURE — 2550000001 HC RX 255 CONTRASTS: Mod: SE | Performed by: OBSTETRICS & GYNECOLOGY

## 2024-03-26 RX ORDER — GADOTERATE MEGLUMINE 376.9 MG/ML
0.2 INJECTION INTRAVENOUS
Status: COMPLETED | OUTPATIENT
Start: 2024-03-26 | End: 2024-03-26

## 2024-03-26 RX ADMIN — GADOTERATE MEGLUMINE 20 ML: 376.9 INJECTION INTRAVENOUS at 09:22

## 2024-03-28 ENCOUNTER — APPOINTMENT (OUTPATIENT)
Dept: OPHTHALMOLOGY | Facility: CLINIC | Age: 44
End: 2024-03-28
Payer: COMMERCIAL

## 2024-03-29 ENCOUNTER — HOSPITAL ENCOUNTER (EMERGENCY)
Facility: HOSPITAL | Age: 44
Discharge: HOME | End: 2024-03-29
Payer: COMMERCIAL

## 2024-03-29 VITALS
WEIGHT: 222 LBS | BODY MASS INDEX: 39.34 KG/M2 | SYSTOLIC BLOOD PRESSURE: 142 MMHG | DIASTOLIC BLOOD PRESSURE: 91 MMHG | HEART RATE: 76 BPM | TEMPERATURE: 97.9 F | HEIGHT: 63 IN | RESPIRATION RATE: 16 BRPM | OXYGEN SATURATION: 96 %

## 2024-03-29 DIAGNOSIS — T65.91XA ALLERGIC REACTION TO CHEMICAL SUBSTANCE, ACCIDENTAL OR UNINTENTIONAL, INITIAL ENCOUNTER: Primary | ICD-10-CM

## 2024-03-29 PROCEDURE — 99282 EMERGENCY DEPT VISIT SF MDM: CPT

## 2024-03-29 PROCEDURE — 2500000004 HC RX 250 GENERAL PHARMACY W/ HCPCS (ALT 636 FOR OP/ED): Performed by: SURGERY

## 2024-03-29 RX ORDER — LORATADINE 10 MG/1
10 TABLET ORAL ONCE
Status: COMPLETED | OUTPATIENT
Start: 2024-03-29 | End: 2024-03-29

## 2024-03-29 RX ORDER — KETOROLAC TROMETHAMINE 30 MG/ML
30 INJECTION, SOLUTION INTRAMUSCULAR; INTRAVENOUS ONCE
Status: DISCONTINUED | OUTPATIENT
Start: 2024-03-29 | End: 2024-03-29 | Stop reason: HOSPADM

## 2024-03-29 RX ADMIN — LORATADINE 10 MG: 10 TABLET ORAL at 04:45

## 2024-03-29 ASSESSMENT — PAIN DESCRIPTION - PROGRESSION: CLINICAL_PROGRESSION: NOT CHANGED

## 2024-03-29 ASSESSMENT — PAIN SCALES - GENERAL: PAINLEVEL_OUTOF10: 6

## 2024-03-29 ASSESSMENT — PAIN DESCRIPTION - DESCRIPTORS: DESCRIPTORS: BURNING

## 2024-03-29 ASSESSMENT — COLUMBIA-SUICIDE SEVERITY RATING SCALE - C-SSRS
1. IN THE PAST MONTH, HAVE YOU WISHED YOU WERE DEAD OR WISHED YOU COULD GO TO SLEEP AND NOT WAKE UP?: NO
6. HAVE YOU EVER DONE ANYTHING, STARTED TO DO ANYTHING, OR PREPARED TO DO ANYTHING TO END YOUR LIFE?: NO
2. HAVE YOU ACTUALLY HAD ANY THOUGHTS OF KILLING YOURSELF?: NO
1. IN THE PAST MONTH, HAVE YOU WISHED YOU WERE DEAD OR WISHED YOU COULD GO TO SLEEP AND NOT WAKE UP?: NO
6. HAVE YOU EVER DONE ANYTHING, STARTED TO DO ANYTHING, OR PREPARED TO DO ANYTHING TO END YOUR LIFE?: NO

## 2024-03-29 ASSESSMENT — PAIN DESCRIPTION - FREQUENCY: FREQUENCY: CONSTANT/CONTINUOUS

## 2024-03-29 ASSESSMENT — PAIN DESCRIPTION - PAIN TYPE: TYPE: ACUTE PAIN

## 2024-03-29 ASSESSMENT — PAIN DESCRIPTION - LOCATION: LOCATION: HEAD

## 2024-03-29 ASSESSMENT — PAIN - FUNCTIONAL ASSESSMENT: PAIN_FUNCTIONAL_ASSESSMENT: 0-10

## 2024-03-29 NOTE — ED TRIAGE NOTES
"Patient arrives from Home via POV with a complaint of a moderate \"burning pain\" on her scalp after getting her hair  at a salon yesterday.  "

## 2024-03-29 NOTE — ED PROVIDER NOTES
Chief Complaint   Patient presents with    Skin Problem     Burning pain on scalp     HPI:   Tsering Carranza is an 43 y.o. female with a history of anxiety, HTN, hypothyroidism that presents from home for burning pain in her scalp after getting her hair dyed at the salon yesterday.  She explains she has had burning sensation since the diet was placed.  She explains she went home and washed her hair with cool water 3 or 4 times and did not feel relief.  She is concerned for chemical burn.  States that she has a lot of anxiety.  The pain is constant and dull and diffuse throughout the scalp.  Denies headache, vision changes, and diplopia.  No pain, NVD.  Denies rashes anywhere else.     Allergies   Allergen Reactions    Azithromycin Dizziness and Other    Bee Pollen Itching    Nut - Unspecified Itching    Pollen Extracts Itching    Prednisone Other    Strawberry Itching    Doxycycline Hives    Duloxetine Other and Unknown     change in mental heart racing elevated BP    Fluoxetine Hcl Anxiety and Unknown     Panic, high anxiety, fatigue    Grass Pollen Itching and Rash    House Dust Itching and Rash   :  Past Medical History:   Diagnosis Date    Acute atopic conjunctivitis, bilateral 09/17/2016    Allergic conjunctivitis of both eyes    Acute vaginitis 07/26/2021    Bacterial vaginitis    Acute vaginitis 07/28/2021    Bacterial vaginosis    Allergic rhinitis due to animal (cat) (dog) hair and dander 09/17/2016    Allergic rhinitis due to cats    Allergic rhinitis due to pollen 09/17/2016    Allergic rhinitis due to pollen    Allergy status to unspecified drugs, medicaments and biological substances     History of seasonal allergies    Body mass index (BMI) 38.0-38.9, adult 03/22/2022    BMI 38.0-38.9,adult    Calculus of kidney 06/16/2022    Kidney stone on left side    Elevated blood-pressure reading, without diagnosis of hypertension 11/11/2020    Elevated blood pressure reading    Hypothyroidism, unspecified  01/31/2014    Primary hypothyroidism    Other allergic rhinitis 09/17/2016    Allergic rhinitis due to dust mite    Other symptoms and signs involving the nervous system 02/06/2020    Suspected sleep apnea    Personal history of other diseases of the circulatory system 08/25/2016    History of cardiac murmur    Personal history of other diseases of the nervous system and sense organs 02/06/2020    History of restless legs syndrome    Personal history of other endocrine, nutritional and metabolic disease 07/21/2022    History of diabetes mellitus    Personal history of other specified conditions 02/22/2018    History of urinary frequency    Personal history of other specified conditions 12/11/2020    History of lump of left breast    Personal history of other specified conditions 09/14/2022    History of snoring    Personal history of other specified conditions 02/08/2014    History of fatigue    Personal history of urinary calculi 03/11/2019    History of renal calculi    Personal history of urinary calculi 05/10/2022    History of renal calculi    Polycystic ovarian syndrome 06/15/2020    PCOS (polycystic ovarian syndrome)    Type 2 diabetes mellitus without complications (CMS/[facility]) 08/26/2022    Diet-controlled diabetes mellitus    Urinary tract infection, site not specified 04/01/2022    Acute UTI     Past Surgical History:   Procedure Laterality Date    OTHER SURGICAL HISTORY  05/16/2022    Tonsillectomy    OTHER SURGICAL HISTORY  09/14/2022    Cholecystectomy laparoscopic    OTHER SURGICAL HISTORY  11/20/2020    Lithotripsy     Family History   Problem Relation Name Age of Onset    Cholelithiasis Mother      Hyperlipidemia Mother      Other (PREDIABETES) Mother      Depression Father      Hypertension Father      Asthma Sister      Asthma Daughter      Other (CARDIAC DISORDER) Other GRANDFATHER         Physical Exam  Vitals and nursing note reviewed.   Constitutional:       General: She is not in acute  distress.     Appearance: She is well-developed.   HENT:      Head: Normocephalic and atraumatic.      Right Ear: Tympanic membrane normal.      Left Ear: Tympanic membrane normal.      Nose: Nose normal.      Mouth/Throat:      Mouth: Mucous membranes are moist.      Pharynx: Oropharynx is clear. No posterior oropharyngeal erythema.      Comments: Posterior oropharynx is open without swelling uvula is midline  Eyes:      Extraocular Movements: Extraocular movements intact.      Conjunctiva/sclera: Conjunctivae normal.      Pupils: Pupils are equal, round, and reactive to light.   Cardiovascular:      Rate and Rhythm: Normal rate and regular rhythm.      Heart sounds: No murmur heard.  Pulmonary:      Effort: Pulmonary effort is normal. No respiratory distress.      Breath sounds: Normal breath sounds.   Abdominal:      Palpations: Abdomen is soft.      Tenderness: There is no abdominal tenderness. There is no guarding or rebound.   Musculoskeletal:         General: No swelling. Normal range of motion.      Cervical back: Neck supple.   Skin:     General: Skin is warm and dry.      Capillary Refill: Capillary refill takes less than 2 seconds.      Comments: Small amount of residual dark dye along her hairline circumferentially of the scalp.  No swelling, erythema, rashes.  No mucous membrane involvement.  No inflammation of follicle   Neurological:      General: No focal deficit present.      Mental Status: She is alert and oriented to person, place, and time.   Psychiatric:         Mood and Affect: Mood normal.        VS: As documented in the triage note and EMR flowsheet from this visit were reviewed.    Medical Decision Making: This is a 43-year-old female presenting with concerns for allergic reaction.  She explains that she went to the salon and got her hair dyed yesterday and developed burning on per scalp since the application of the dye.  She has tried to wash her hair 3-4 times has not felt relief.  On exam  there was small amount of residual dye along her hairline of her scalp.  There was no sloughing of the skin swelling or erythema.  No inflammation at the follicles the hair was intact.  There is no rashes or mucous membrane involvement.  Explained I would like to treat the patient with a steroid in which she refused because she gets strong drug reactions to this medication.  Offered anti-inflammatory which patient also declined due to fear of reaction.  Patient was willing to take Claritin and patient did feel relief with this medication.  Explained that she will need to call the salon to address any allergens.  Recommended anti-inflammatories and antihistamines.  Recommended that she not wash the area too much to prevent drying of the skin.     Diagnoses as of 03/29/24 0517   Allergic reaction to chemical substance, accidental or unintentional, initial encounter       Procedures     Chronic Medical Conditions Significantly Affecting Care:      Escalation of Care: Appropriate for outpatient     Prescription Drug Consideration: Antihistamines and anti-inflammatories however patient refused anti-inflammatory for fear of drug reaction    Counseling: Spoke with the patient and discussed today´s findings, in addition to providing specific details for the plan of care and expected course.  Patient was given the opportunity to ask questions.    Discussed return precautions and importance of follow-up.  Advised to follow-up with PCP.  Advised to return to the ED for changing or worsening symptoms, new symptoms, complaint specific precautions, and precautions listed on the discharge paperwork.  Educated on the common potential side effects of medications prescribed.    I advised the patient that the emergency evaluation and treatment provided today doesn't end their need for medical care. It is very important that they follow-up with their primary care provider or other specialist as instructed.    The plan of care was  mutually agreed upon with the patient. The patient and/or family were given the opportunity to ask questions. All questions asked today in the ED were answered to the best of my ability with today's information.    I specifically advised the patient to return to the ED for changing or worsening symptoms, worrisome new symptoms, or for any complaint specific precautions listed on the discharge paperwork.    This patient was cared for in the setting of nationwide stress on resources and staffing.    This report was transcribed using voice recognition software.  Every effort was made to ensure accuracy, however, inadvertently computerized transcription errors may be present.       Melvin Suárez PA-C  03/29/24 0547

## 2024-04-01 ENCOUNTER — TELEMEDICINE (OUTPATIENT)
Dept: OBSTETRICS AND GYNECOLOGY | Facility: CLINIC | Age: 44
End: 2024-04-01
Payer: COMMERCIAL

## 2024-04-01 DIAGNOSIS — N80.03 ADENOMYOSIS: Primary | ICD-10-CM

## 2024-04-01 DIAGNOSIS — R10.2 CHRONIC PELVIC PAIN IN FEMALE: ICD-10-CM

## 2024-04-01 DIAGNOSIS — G89.29 CHRONIC PELVIC PAIN IN FEMALE: ICD-10-CM

## 2024-04-01 PROCEDURE — 99212 OFFICE O/P EST SF 10 MIN: CPT | Performed by: OBSTETRICS & GYNECOLOGY

## 2024-04-01 PROCEDURE — 3008F BODY MASS INDEX DOCD: CPT | Performed by: OBSTETRICS & GYNECOLOGY
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
## 2024-04-02 ASSESSMENT — PATIENT GLOBAL ASSESSMENT (PGA): WHAT IS THE PGA: PATIENT GLOBAL ASSESSMENT:  4 - SEVERE

## 2024-04-02 ASSESSMENT — DERMATOLOGY QUALITY OF LIFE (QOL) ASSESSMENT
RATE HOW EMOTIONALLY BOTHERED YOU ARE BY YOUR SKIN PROBLEM (FOR EXAMPLE, WORRY, EMBARRASSMENT, FRUSTRATION): 6 - ALWAYS BOTHERED
WHAT SINGLE SKIN CONDITION LISTED BELOW IS THE PATIENT ANSWERING THE QUALITY-OF-LIFE ASSESSMENT QUESTIONS ABOUT: NONE OF THE ABOVE
RATE HOW EMOTIONALLY BOTHERED YOU ARE BY YOUR SKIN PROBLEM (FOR EXAMPLE, WORRY, EMBARRASSMENT, FRUSTRATION): 6 - ALWAYS BOTHERED
RATE HOW BOTHERED YOU ARE BY EFFECTS OF YOUR SKIN PROBLEMS ON YOUR ACTIVITIES (EG, GOING OUT, ACCOMPLISHING WHAT YOU WANT, WORK ACTIVITIES OR YOUR RELATIONSHIPS WITH OTHERS): 6 - ALWAYS BOTHERED
RATE HOW BOTHERED YOU ARE BY SYMPTOMS OF YOUR SKIN PROBLEM (EG, ITCHING, STINGING BURNING, HURTING OR SKIN IRRITATION): 6 - ALWAYS BOTHERED
WHAT SINGLE SKIN CONDITION LISTED BELOW IS THE PATIENT ANSWERING THE QUALITY-OF-LIFE ASSESSMENT QUESTIONS ABOUT: NONE OF THE ABOVE
RATE HOW BOTHERED YOU ARE BY EFFECTS OF YOUR SKIN PROBLEMS ON YOUR ACTIVITIES (EG, GOING OUT, ACCOMPLISHING WHAT YOU WANT, WORK ACTIVITIES OR YOUR RELATIONSHIPS WITH OTHERS): 6 - ALWAYS BOTHERED
RATE HOW BOTHERED YOU ARE BY SYMPTOMS OF YOUR SKIN PROBLEM (EG, ITCHING, STINGING BURNING, HURTING OR SKIN IRRITATION): 6 - ALWAYS BOTHERED

## 2024-04-03 ENCOUNTER — OFFICE VISIT (OUTPATIENT)
Dept: DERMATOLOGY | Facility: CLINIC | Age: 44
End: 2024-04-03
Payer: COMMERCIAL

## 2024-04-03 DIAGNOSIS — R20.9 DISTURBANCE OF SKIN SENSATION: Primary | ICD-10-CM

## 2024-04-03 DIAGNOSIS — L60.3 NAIL DYSTROPHY: ICD-10-CM

## 2024-04-03 PROCEDURE — 87102 FUNGUS ISOLATION CULTURE: CPT | Performed by: DERMATOLOGY

## 2024-04-03 PROCEDURE — 3008F BODY MASS INDEX DOCD: CPT | Performed by: DERMATOLOGY

## 2024-04-03 PROCEDURE — 99214 OFFICE O/P EST MOD 30 MIN: CPT | Performed by: DERMATOLOGY

## 2024-04-03 PROCEDURE — 1036F TOBACCO NON-USER: CPT | Performed by: DERMATOLOGY

## 2024-04-03 ASSESSMENT — ITCH NUMERIC RATING SCALE: HOW SEVERE IS YOUR ITCHING?: 2
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
## 2024-04-10 ENCOUNTER — OFFICE VISIT (OUTPATIENT)
Dept: DERMATOLOGY | Facility: CLINIC | Age: 44
End: 2024-04-10
Payer: COMMERCIAL

## 2024-04-10 DIAGNOSIS — L21.9 SEBORRHEIC DERMATITIS: Primary | ICD-10-CM

## 2024-04-10 DIAGNOSIS — L85.3 XEROSIS CUTIS: ICD-10-CM

## 2024-04-10 PROCEDURE — 99213 OFFICE O/P EST LOW 20 MIN: CPT | Performed by: DERMATOLOGY

## 2024-04-10 PROCEDURE — 1036F TOBACCO NON-USER: CPT | Performed by: DERMATOLOGY

## 2024-04-10 PROCEDURE — 3008F BODY MASS INDEX DOCD: CPT | Performed by: DERMATOLOGY

## 2024-04-10 RX ORDER — FLUOCINOLONE ACETONIDE 0.1 MG/ML
SOLUTION TOPICAL 2 TIMES DAILY
Qty: 90 ML | Refills: 0 | Status: SHIPPED | OUTPATIENT
Start: 2024-04-10 | End: 2024-04-24

## 2024-04-10 RX ORDER — KETOCONAZOLE 20 MG/ML
SHAMPOO, SUSPENSION TOPICAL EVERY OTHER DAY
Qty: 120 ML | Refills: 2 | Status: SHIPPED | OUTPATIENT
Start: 2024-04-10

## 2024-04-10 ASSESSMENT — DERMATOLOGY QUALITY OF LIFE (QOL) ASSESSMENT
RATE HOW BOTHERED YOU ARE BY SYMPTOMS OF YOUR SKIN PROBLEM (EG, ITCHING, STINGING BURNING, HURTING OR SKIN IRRITATION): 6 - ALWAYS BOTHERED
RATE HOW BOTHERED YOU ARE BY EFFECTS OF YOUR SKIN PROBLEMS ON YOUR ACTIVITIES (EG, GOING OUT, ACCOMPLISHING WHAT YOU WANT, WORK ACTIVITIES OR YOUR RELATIONSHIPS WITH OTHERS): 6 - ALWAYS BOTHERED
ARE THERE EXCLUSIONS OR EXCEPTIONS FOR THE QUALITY OF LIFE ASSESSMENT: NO
RATE HOW BOTHERED YOU ARE BY SYMPTOMS OF YOUR SKIN PROBLEM (EG, ITCHING, STINGING BURNING, HURTING OR SKIN IRRITATION): 3
WHAT SINGLE SKIN CONDITION LISTED BELOW IS THE PATIENT ANSWERING THE QUALITY-OF-LIFE ASSESSMENT QUESTIONS ABOUT: DERMATITIS
RATE HOW EMOTIONALLY BOTHERED YOU ARE BY YOUR SKIN PROBLEM (FOR EXAMPLE, WORRY, EMBARRASSMENT, FRUSTRATION): 6 - ALWAYS BOTHERED
RATE HOW BOTHERED YOU ARE BY EFFECTS OF YOUR SKIN PROBLEMS ON YOUR ACTIVITIES (EG, GOING OUT, ACCOMPLISHING WHAT YOU WANT, WORK ACTIVITIES OR YOUR RELATIONSHIPS WITH OTHERS): 0 - NEVER BOTHERED
WHAT SINGLE SKIN CONDITION LISTED BELOW IS THE PATIENT ANSWERING THE QUALITY-OF-LIFE ASSESSMENT QUESTIONS ABOUT: NONE OF THE ABOVE
RATE HOW BOTHERED YOU ARE BY EFFECTS OF YOUR SKIN PROBLEMS ON YOUR ACTIVITIES (EG, GOING OUT, ACCOMPLISHING WHAT YOU WANT, WORK ACTIVITIES OR YOUR RELATIONSHIPS WITH OTHERS): 6 - ALWAYS BOTHERED
RATE HOW BOTHERED YOU ARE BY SYMPTOMS OF YOUR SKIN PROBLEM (EG, ITCHING, STINGING BURNING, HURTING OR SKIN IRRITATION): 6 - ALWAYS BOTHERED
WHAT SINGLE SKIN CONDITION LISTED BELOW IS THE PATIENT ANSWERING THE QUALITY-OF-LIFE ASSESSMENT QUESTIONS ABOUT: NONE OF THE ABOVE
RATE HOW EMOTIONALLY BOTHERED YOU ARE BY YOUR SKIN PROBLEM (FOR EXAMPLE, WORRY, EMBARRASSMENT, FRUSTRATION): 6 - ALWAYS BOTHERED
RATE HOW EMOTIONALLY BOTHERED YOU ARE BY YOUR SKIN PROBLEM (FOR EXAMPLE, WORRY, EMBARRASSMENT, FRUSTRATION): 3

## 2024-04-10 ASSESSMENT — DERMATOLOGY PATIENT ASSESSMENT
FOR PATIENTS COMING IN FOR A FOLLOW-UP VISIT - HAVE THERE BEEN ANY CHANGES IN YOUR HEALTH SINCE YOUR LAST VISIT: NO
ARE YOU TRYING TO GET PREGNANT: NO
DO YOU HAVE ANY NEW OR CHANGING LESIONS: NO
DO YOU USE SUNSCREEN: OCCASIONALLY
DO YOU USE A TANNING BED: NO
ARE YOU AN ORGAN TRANSPLANT RECIPIENT: NO

## 2024-04-10 ASSESSMENT — PATIENT GLOBAL ASSESSMENT (PGA)
WHAT IS THE PGA: PATIENT GLOBAL ASSESSMENT:  3 - MODERATE
PATIENT GLOBAL ASSESSMENT: PATIENT GLOBAL ASSESSMENT:  2 - MILD

## 2024-04-10 ASSESSMENT — ITCH NUMERIC RATING SCALE: HOW SEVERE IS YOUR ITCHING?: 3
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
## 2024-04-11 ENCOUNTER — OFFICE VISIT (OUTPATIENT)
Dept: ALLERGY | Facility: CLINIC | Age: 44
End: 2024-04-11
Payer: COMMERCIAL

## 2024-04-11 ENCOUNTER — TELEPHONE (OUTPATIENT)
Dept: ALLERGY | Facility: CLINIC | Age: 44
End: 2024-04-11

## 2024-04-11 VITALS
HEIGHT: 63 IN | BODY MASS INDEX: 41.21 KG/M2 | SYSTOLIC BLOOD PRESSURE: 159 MMHG | WEIGHT: 232.6 LBS | HEART RATE: 75 BPM | DIASTOLIC BLOOD PRESSURE: 93 MMHG

## 2024-04-11 DIAGNOSIS — J30.81 ALLERGIC RHINITIS DUE TO ANIMAL HAIR AND DANDER: ICD-10-CM

## 2024-04-11 DIAGNOSIS — T78.49XA ALLERGIC REACTION TO HAIR DYE: ICD-10-CM

## 2024-04-11 DIAGNOSIS — H10.13 ALLERGIC CONJUNCTIVITIS OF BOTH EYES: ICD-10-CM

## 2024-04-11 DIAGNOSIS — J30.1 SEASONAL ALLERGIC RHINITIS DUE TO POLLEN: Primary | ICD-10-CM

## 2024-04-11 DIAGNOSIS — J30.89 ALLERGIC RHINITIS DUE TO DUST MITE: ICD-10-CM

## 2024-04-11 DIAGNOSIS — Z91.018 TREE NUT ALLERGY: ICD-10-CM

## 2024-04-11 PROCEDURE — 99214 OFFICE O/P EST MOD 30 MIN: CPT | Performed by: ALLERGY & IMMUNOLOGY

## 2024-04-11 PROCEDURE — 3077F SYST BP >= 140 MM HG: CPT | Performed by: ALLERGY & IMMUNOLOGY

## 2024-04-11 PROCEDURE — 3008F BODY MASS INDEX DOCD: CPT | Performed by: ALLERGY & IMMUNOLOGY

## 2024-04-11 PROCEDURE — 3080F DIAST BP >= 90 MM HG: CPT | Performed by: ALLERGY & IMMUNOLOGY

## 2024-04-11 RX ORDER — EPINEPHRINE 0.3 MG/.3ML
INJECTION INTRAMUSCULAR
Qty: 2 EACH | Refills: 1 | Status: SHIPPED | OUTPATIENT
Start: 2024-04-11

## 2024-04-11 RX ORDER — MINERAL OIL
180 ENEMA (ML) RECTAL DAILY PRN
Qty: 30 TABLET | Refills: 11 | Status: SHIPPED | OUTPATIENT
Start: 2024-04-11 | End: 2025-04-11

## 2024-04-11 RX ORDER — OLOPATADINE HYDROCHLORIDE 2 MG/ML
1 SOLUTION/ DROPS OPHTHALMIC DAILY PRN
Qty: 2.5 ML | Refills: 5 | Status: SHIPPED | OUTPATIENT
Start: 2024-04-11 | End: 2025-04-11

## 2024-04-11 RX ORDER — FLUTICASONE PROPIONATE 50 MCG
2 SPRAY, SUSPENSION (ML) NASAL DAILY
Qty: 16 G | Refills: 11 | Status: SHIPPED | OUTPATIENT
Start: 2024-04-11 | End: 2025-04-11

## 2024-04-11 NOTE — PROGRESS NOTES
"Tsering Carranza presents for follow up evaluation today.      She states that she had a reaction to hair dye 2 weeks ago.  She notes that she had hair dye placed and within minutes developed a burning sensation on her scalp that felt itchy.  She did not have any peeling.  She felt as if her scalp felt like sandpaper.  She had no scalp pain prior to the higher dye exposure.  Since that time she has had continued scalp pain.  She saw dermatology yesterday who prescribed topical steroid scalp solution as well as ketoconazole shampoo.  She has not started these therapies.  She feels that her scalp is still dry itchy and painful.    Regarding environmental allergies, she notes that her allergies were significantly better last year.  She uses Flonase, fexofenadine and Pataday as needed.      Regarding food allergy, she has not reintroduced tree nuts to her diet, however has been eating [employer] peanut butter cups and has not had any issue.      Review of Systems   Constitutional: Negative.    HENT: Negative.     Eyes: Negative.    Respiratory: Negative.     Cardiovascular: Negative.    Gastrointestinal: Negative.    Musculoskeletal: Negative.    Skin: Negative.         Scalp pain   Allergic/Immunologic: Negative.    Hematological: Negative.        Vital signs:  BP (!) 159/93   Pulse 75   Ht 1.6 m (5' 3\")   Wt 106 kg (232 lb 9.6 oz)   BMI 41.20 kg/m²       GENERAL: Alert, oriented and in no acute distress.     HEENT: EYES: No conjunctival injection or cobblestoning. Nose: nasal turbinates mildly edematous and are not boggy.  There is no mucous stranding, polyps, or blood    noted. EARS: Tympanic membranes are clear. MOUTH: moist and pink with no exudates, ulcers, or thrush. NECK: is supple, without adenopathy.  No upper airway stridor noted.       HEART: regular rate and rhythm.       LUNGS: Clear to auscultation bilaterally. No wheezing, rhonchi or rales.        ABDOMEN: Positive bowel sounds, soft, nontender, " nondistended.       EXTREMITIES: No clubbing or edema.        NEURO:  Normal affect.  Gait normal.      SKIN: No rash, hives, or angioedema noted.  No scalp lesions noted.      Impression:   1. Seasonal allergic rhinitis due to pollen    2. Allergic rhinitis due to animal hair and dander    3. Allergic rhinitis due to dust mite    4. Tree nut allergy    5. Allergic conjunctivitis of both eyes    6. Allergic reaction to hair dye       Assessment and plan:  Allergic reaction to hair dye: Reports continued scalp tenderness after use of hair dye.  Encouraged her to start therapies prescribed by dermatology.  Continue to avoid hair dye.  No active rash at this time, however will touch base with dermatology about patch testing, as desires to know which chemical agent to avoid in the future.    Allergic rhinitis, seasonal and perennial: Environmental allergy testing positive to tree, grass, weed, cat, dog, dust mite.  May continue Flonase 2 sprays each nostril once daily and fexofenadine 180 mg once daily as needed. If symptoms are not well controlled, she will let us know.     Peanut and tree nut allergy: Allergy skin prick testing positive for peanut and hazelnut.  Peanut sIgE testing was negative and peanut component testing positive for araH8.  She is now tolerating peanut with no problem.  She has not reintroduced tree nuts by desire.      Follow-up yearly or sooner if needed

## 2024-04-11 NOTE — TELEPHONE ENCOUNTER
Left a voice mail to let her know insurance is not going to cover the fexofenadine since it is over the counter and they are ordering in the epi pens but not available until tomorrow

## 2024-04-11 NOTE — PATIENT INSTRUCTIONS
It was nice to see you today    Follow recommendations from Dermatology regarding scalp care    We will touch base with Dermatology about patch testing     You may use Allegra (Fexofenadine) 180 mg once daily      You may use Flonase 2 sprays each nostril once daily    Technique for nasal spray administration:  First, look slightly down, breathe normally, you do not need to sniff while you spray.  To use the nose spray, place the tip in your nose with the opposite hand (left hand, right side of nose, right hand, left side of nose), and aim or point toward the outside of the nose.  Do not sniff or snort medication afterwards as this can cause most of the medication to be swallowed.  Rather, dab your nose with a tissue if any runs out.    You may use olopatadine 0.2% eyedrops 1 drop each eye daily as needed    Follow up yearly or sooner if needed

## 2024-04-12 ASSESSMENT — ENCOUNTER SYMPTOMS
ROS SKIN COMMENTS: SCALP PAIN
ALLERGIC/IMMUNOLOGIC NEGATIVE: 1
RESPIRATORY NEGATIVE: 1
EYES NEGATIVE: 1
MUSCULOSKELETAL NEGATIVE: 1
CONSTITUTIONAL NEGATIVE: 1
HEMATOLOGIC/LYMPHATIC NEGATIVE: 1
GASTROINTESTINAL NEGATIVE: 1
CARDIOVASCULAR NEGATIVE: 1

## 2024-04-15 ENCOUNTER — TELEPHONE (OUTPATIENT)
Dept: DERMATOLOGY | Facility: CLINIC | Age: 44
End: 2024-04-15
Payer: COMMERCIAL

## 2024-04-15 LAB
CALCOFLUOR WHITE SPEC: ABNORMAL
FUNGUS SPEC CULT: ABNORMAL

## 2024-04-15 ASSESSMENT — DERMATOLOGY QUALITY OF LIFE (QOL) ASSESSMENT
RATE HOW BOTHERED YOU ARE BY EFFECTS OF YOUR SKIN PROBLEMS ON YOUR ACTIVITIES (EG, GOING OUT, ACCOMPLISHING WHAT YOU WANT, WORK ACTIVITIES OR YOUR RELATIONSHIPS WITH OTHERS): 0 - NEVER BOTHERED
WHAT SINGLE SKIN CONDITION LISTED BELOW IS THE PATIENT ANSWERING THE QUALITY-OF-LIFE ASSESSMENT QUESTIONS ABOUT: NONE OF THE ABOVE
RATE HOW BOTHERED YOU ARE BY EFFECTS OF YOUR SKIN PROBLEMS ON YOUR ACTIVITIES (EG, GOING OUT, ACCOMPLISHING WHAT YOU WANT, WORK ACTIVITIES OR YOUR RELATIONSHIPS WITH OTHERS): 0 - NEVER BOTHERED
WHAT SINGLE SKIN CONDITION LISTED BELOW IS THE PATIENT ANSWERING THE QUALITY-OF-LIFE ASSESSMENT QUESTIONS ABOUT: NONE OF THE ABOVE
RATE HOW BOTHERED YOU ARE BY SYMPTOMS OF YOUR SKIN PROBLEM (EG, ITCHING, STINGING BURNING, HURTING OR SKIN IRRITATION): 0 - NEVER BOTHERED
RATE HOW EMOTIONALLY BOTHERED YOU ARE BY YOUR SKIN PROBLEM (FOR EXAMPLE, WORRY, EMBARRASSMENT, FRUSTRATION): 6 - ALWAYS BOTHERED
RATE HOW EMOTIONALLY BOTHERED YOU ARE BY YOUR SKIN PROBLEM (FOR EXAMPLE, WORRY, EMBARRASSMENT, FRUSTRATION): 6 - ALWAYS BOTHERED
RATE HOW BOTHERED YOU ARE BY SYMPTOMS OF YOUR SKIN PROBLEM (EG, ITCHING, STINGING BURNING, HURTING OR SKIN IRRITATION): 0 - NEVER BOTHERED

## 2024-04-15 NOTE — RESULT ENCOUNTER NOTE
Pt was contacted and notified of positive fungal culture on toenails.  Pt wishes to make appointment with Dr. Augustin to discuss further treatment options.     Can someone please reach out to patient to schedule for follow up appointment for nail fungus?    Thank you!    Nathan Melgar LPN

## 2024-04-15 NOTE — TELEPHONE ENCOUNTER
Pt called inquiring about test results related to fungal culture. Pt was told that fungal culture can take a couple of weeks to result.  Pt was told that office will reach out to patient once results come in.  Pt verbalized understanding.    Nathan Melgar LPN

## 2024-04-16 ENCOUNTER — OFFICE VISIT (OUTPATIENT)
Dept: DERMATOLOGY | Facility: CLINIC | Age: 44
End: 2024-04-16
Payer: COMMERCIAL

## 2024-04-16 DIAGNOSIS — B35.1 ONYCHOMYCOSIS: Primary | ICD-10-CM

## 2024-04-16 DIAGNOSIS — B35.3 TINEA PEDIS OF BOTH FEET: ICD-10-CM

## 2024-04-16 PROCEDURE — 1036F TOBACCO NON-USER: CPT | Performed by: DERMATOLOGY

## 2024-04-16 PROCEDURE — 99214 OFFICE O/P EST MOD 30 MIN: CPT | Performed by: DERMATOLOGY

## 2024-04-16 PROCEDURE — 3008F BODY MASS INDEX DOCD: CPT | Performed by: DERMATOLOGY

## 2024-04-16 RX ORDER — EFINACONAZOLE 100 MG/ML
SOLUTION TOPICAL
Qty: 8 ML | Refills: 3 | Status: SHIPPED | OUTPATIENT
Start: 2024-04-16

## 2024-04-16 RX ORDER — KETOCONAZOLE 20 MG/G
CREAM TOPICAL
Qty: 60 G | Refills: 3 | Status: SHIPPED | OUTPATIENT
Start: 2024-04-16

## 2024-04-16 NOTE — PROGRESS NOTES
Subjective     Tsering Carrnaza is a 43 y.o. female who presents for the following: Nail Problem (Toenails- positive cultures- pt here to discuss treatment options. ).     Review of Systems:  No other skin or systemic complaints other than what is documented elsewhere in the note.    The following portions of the chart were reviewed this encounter and updated as appropriate:   Tobacco  Allergies  Meds  Problems  Med Hx  Surg Hx  Fam Hx         Skin Cancer History  No skin cancer on file.      Specialty Problems    None       Objective   Well appearing patient in no apparent distress; mood and affect are within normal limits.    A focused skin examination was performed. All findings within normal limits unless otherwise noted below.    Assessment/Plan   1. Onychomycosis  Right 3rd Toenail  Thickened, discolored nail plate with subungual debris     Culture positive for T rubrum  -Discussed nature of the condition  -This is a chronic issue that is often difficult to treat and will often recur once treated  -Reviewed treatment options including relative cure rates, laboratory monitoring and recurrence rates for topical and oral medications  -After discussion, patient wishes to proceed with topical Jublia; rx sent to to [facility] pharmacy for cash price. Patient to contact me if not able to obtain cash price  -Apply to the affected nail once daily at bedtime until  nail has grown out normally    Related Medications  efinaconazole (Jublia) 10 % solution with applicator  Apply to affected nails nightly.    2. Tinea pedis of both feet (2)  Left Foot - Posterior, Right Foot - Posterior  Erythema and scaling in a moccasin-distribution with interdigital web space maceration    -Discussed nature of the condition  -This is a chronic condition and recurrence is likely without maintenance use of therapy.  -Keep affected areas as cool and dry as possible.  -Apply medication (rx ketoconazole) to the soles of feet and in  between the toe web spaces until skin has returned to normal, then continue at least once weekly for maintenance        ketoconazole (NIZOral) 2 % cream - Left Foot - Posterior, Right Foot - Posterior  Apply to soles and in between the toes once daily for maintenance.        Follow up as needed  Discussed if there are any changes or development of concerning symptoms (lesion/skin condition is changing, bleeding, enlarging, or worsening) the patient is to contact my office. The patient verbalizes understanding.    Miranda Carson MD  4/16/2024

## 2024-04-23 ENCOUNTER — APPOINTMENT (OUTPATIENT)
Dept: RADIOLOGY | Facility: CLINIC | Age: 44
End: 2024-04-23
Payer: COMMERCIAL

## 2024-04-23 DIAGNOSIS — Z12.31 SCREENING MAMMOGRAM FOR BREAST CANCER: ICD-10-CM

## 2024-04-26 ENCOUNTER — HOSPITAL ENCOUNTER (OUTPATIENT)
Dept: RADIOLOGY | Facility: CLINIC | Age: 44
Discharge: HOME | End: 2024-04-26
Payer: COMMERCIAL

## 2024-04-26 VITALS — BODY MASS INDEX: 39.87 KG/M2 | WEIGHT: 225 LBS | HEIGHT: 63 IN

## 2024-04-26 DIAGNOSIS — Z12.31 SCREENING MAMMOGRAM FOR BREAST CANCER: ICD-10-CM

## 2024-04-26 PROCEDURE — 77067 SCR MAMMO BI INCL CAD: CPT

## 2024-04-26 PROCEDURE — 77063 BREAST TOMOSYNTHESIS BI: CPT | Performed by: RADIOLOGY

## 2024-04-26 PROCEDURE — 77067 SCR MAMMO BI INCL CAD: CPT | Performed by: RADIOLOGY

## 2024-04-30 ENCOUNTER — APPOINTMENT (OUTPATIENT)
Dept: ORTHOPEDIC SURGERY | Facility: CLINIC | Age: 44
End: 2024-04-30
Payer: COMMERCIAL

## 2024-05-09 ENCOUNTER — OFFICE VISIT (OUTPATIENT)
Dept: OPHTHALMOLOGY | Facility: CLINIC | Age: 44
End: 2024-05-09
Payer: COMMERCIAL

## 2024-05-09 DIAGNOSIS — H02.88B MEIBOMIAN GLAND DYSFUNCTION LEFT EYE, UPPER AND LOWER EYELIDS: ICD-10-CM

## 2024-05-09 DIAGNOSIS — H02.88A MEIBOMIAN GLAND DYSFUNCTION RIGHT EYE, UPPER AND LOWER EYELIDS: ICD-10-CM

## 2024-05-09 DIAGNOSIS — H52.13 MYOPIA OF BOTH EYES: Primary | ICD-10-CM

## 2024-05-09 PROCEDURE — 92015 DETERMINE REFRACTIVE STATE: CPT | Performed by: OPTOMETRIST

## 2024-05-09 PROCEDURE — FLVLG CONTACT LENS EVAL - LEVEL 2 (SP): Performed by: OPTOMETRIST

## 2024-05-09 PROCEDURE — 92014 COMPRE OPH EXAM EST PT 1/>: CPT | Performed by: OPTOMETRIST

## 2024-05-09 ASSESSMENT — VISUAL ACUITY
OD_CC: 20/20
OS_CC: 20/20
OD_CC: 20/25
METHOD: SNELLEN - LINEAR
OS_CC: 20/25

## 2024-05-09 ASSESSMENT — TONOMETRY
OS_IOP_MMHG: 18
OD_IOP_MMHG: 18
IOP_METHOD: GOLDMANN APPLANATION

## 2024-05-09 ASSESSMENT — REFRACTION_CURRENTRX
OD_BASECURVE: 8.5
OD_BASECURVE: 8.7
OD_SPHERE: -4.75
OS_BASECURVE: 8.6
OD_DIAMETER: 14.2
OD_SPHERE: -4.75
OD_BASECURVE: 8.6
OS_DIAMETER: 14.2
OS_DIAMETER: 14.2
OS_SPHERE: -4.75
OS_SPHERE: -4.75
OS_BRAND: PROCLEAR 1 DAY
OS_SPHERE: -4.75
OD_BRAND: ACUVUE 1 DAY MOIST
OD_DIAMETER: 14.2
OS_BRAND: BIOTRUE 1 DAY
OS_DIAMETER: 14.2
OD_DIAMETER: 14.2
OS_BRAND: ACUVUE 1 DAY MOIST
OD_BRAND: PROCLEAR 1 DAY
OS_BASECURVE: 8.5
OS_BASECURVE: 8.7
OD_BRAND: BIOTRUE 1 DAY
OD_SPHERE: -4.75

## 2024-05-09 ASSESSMENT — REFRACTION_WEARINGRX
OS_AXIS: 0.40
OS_SPHERE: -4.75
OD_CYLINDER: -0.50
OD_SPHERE: -5.75
OD_AXIS: 125
OS_CYLINDER: -0.50

## 2024-05-09 ASSESSMENT — ENCOUNTER SYMPTOMS
GASTROINTESTINAL NEGATIVE: 1
ALLERGIC/IMMUNOLOGIC NEGATIVE: 1
EYES NEGATIVE: 1
ALLERGIC/IMMUNOLOGIC COMMENTS: SEASONAL ALLERGIES
CARDIOVASCULAR NEGATIVE: 0
ENDOCRINE NEGATIVE: 1
PSYCHIATRIC NEGATIVE: 1
RESPIRATORY NEGATIVE: 0
HEMATOLOGIC/LYMPHATIC NEGATIVE: 0
NEUROLOGICAL NEGATIVE: 0
MUSCULOSKELETAL NEGATIVE: 0
ENDOCRINE COMMENTS: TYPE 2 DM

## 2024-05-09 ASSESSMENT — CONF VISUAL FIELD
OD_SUPERIOR_NASAL_RESTRICTION: 0
METHOD: COUNTING FINGERS
OS_SUPERIOR_NASAL_RESTRICTION: 0
OS_INFERIOR_NASAL_RESTRICTION: 0
OS_NORMAL: 1
OS_INFERIOR_TEMPORAL_RESTRICTION: 0
OD_INFERIOR_TEMPORAL_RESTRICTION: 0
OD_SUPERIOR_TEMPORAL_RESTRICTION: 0
OD_INFERIOR_NASAL_RESTRICTION: 0
OS_SUPERIOR_TEMPORAL_RESTRICTION: 0
OD_NORMAL: 1

## 2024-05-09 ASSESSMENT — CUP TO DISC RATIO
OD_RATIO: 0.4
OS_RATIO: 0.4

## 2024-05-09 ASSESSMENT — REFRACTION_MANIFEST
OS_SPHERE: -5.00
OD_SPHERE: -5.50

## 2024-05-09 ASSESSMENT — SLIT LAMP EXAM - LIDS
COMMENTS: NORMAL
COMMENTS: NORMAL

## 2024-05-09 ASSESSMENT — EXTERNAL EXAM - RIGHT EYE: OD_EXAM: NORMAL

## 2024-05-09 ASSESSMENT — EXTERNAL EXAM - LEFT EYE: OS_EXAM: NORMAL

## 2024-05-09 NOTE — PROGRESS NOTES
Assessment/Plan   Diagnoses and all orders for this visit:  Myopia of both eyes  New spec rx released today per patient request. Ocular health wnl for age OU. Monitor 1 year or sooner prn. Refraction billed today.   Discussed proper wear, care, replacement of contact lenses. Gave handout. D/c cl wear and RTC if eyes become red, painful, irritated. Monitor 1 year.   CL eval billed today.  $55  Trialing biotrue vs 1 day moist. Biotrue trials on order, mailed to patient. Can finalize whichever pt likes.     Meibomian gland dysfunction right eye, upper and lower eyelids  Meibomian gland dysfunction left eye, upper and lower eyelids  Recommend hot compresses daily OU. ATs bid-tid OU (sample of biotrue) Discussed that this is a chronic problem which requires chronic/consistent treatment.

## 2024-05-09 NOTE — Clinical Note
Both eyes (OU) Contact Lens Order  Quantity: 2 Package: TRIAL Appointment needed? No Medically necessary? No Ship To: Home Additional instructions: Please order 2 trial packs and mail to patient. Switching brands.

## 2024-05-31 ENCOUNTER — TELEPHONE (OUTPATIENT)
Dept: HEMATOLOGY/ONCOLOGY | Facility: CLINIC | Age: 44
End: 2024-05-31
Payer: COMMERCIAL

## 2024-05-31 NOTE — TELEPHONE ENCOUNTER
Tsering Carranza would like to cancel the following appointments:     Dakota Duncan in [facility] SHARON5 MEDON ([phone]), 7/9/2024 11:20 AM       Call placed to patient.  No answer.  Message left on identifiable voicemail with call back instructions.

## 2024-06-18 ENCOUNTER — DOCUMENTATION (OUTPATIENT)
Dept: OPHTHALMOLOGY | Facility: CLINIC | Age: 44
End: 2024-06-18
Payer: COMMERCIAL

## 2024-06-18 ASSESSMENT — REFRACTION_CURRENTRX
OD_BASECURVE: 8.6
OS_BRAND: INFUSE
OS_BASECURVE: 8.6
OS_SPHERE: -4.75
OD_SPHERE: -4.75
OS_DIAMETER: 14.2
OD_BRAND: INFUSE
OD_DIAMETER: 14.2
OD_BRAND: INFUSE
OS_BRAND: INFUSE

## 2024-06-18 NOTE — Clinical Note
Both eyes (OU) Contact Lens Order  Quantity: 4 Package: TRIAL Appointment needed? No Medically necessary? No Ship To: Home Additional instructions: Hello, can you please order 4 total trial packs and mail to the patient? She wants to try a different brand.

## 2024-07-09 ENCOUNTER — APPOINTMENT (OUTPATIENT)
Dept: HEMATOLOGY/ONCOLOGY | Facility: CLINIC | Age: 44
End: 2024-07-09
Payer: COMMERCIAL

## 2024-07-10 ENCOUNTER — APPOINTMENT (OUTPATIENT)
Dept: HEMATOLOGY/ONCOLOGY | Facility: CLINIC | Age: 44
End: 2024-07-10
Payer: COMMERCIAL

## 2024-07-12 ENCOUNTER — APPOINTMENT (OUTPATIENT)
Dept: HEMATOLOGY/ONCOLOGY | Facility: CLINIC | Age: 44
End: 2024-07-12
Payer: COMMERCIAL

## 2024-07-16 ENCOUNTER — APPOINTMENT (OUTPATIENT)
Dept: HEMATOLOGY/ONCOLOGY | Facility: CLINIC | Age: 44
End: 2024-07-16
Payer: COMMERCIAL

## 2024-07-17 ENCOUNTER — APPOINTMENT (OUTPATIENT)
Dept: OBSTETRICS AND GYNECOLOGY | Facility: CLINIC | Age: 44
End: 2024-07-17
Payer: COMMERCIAL

## 2024-07-22 ENCOUNTER — APPOINTMENT (OUTPATIENT)
Dept: OBSTETRICS AND GYNECOLOGY | Facility: CLINIC | Age: 44
End: 2024-07-22
Payer: COMMERCIAL

## 2024-07-31 ENCOUNTER — APPOINTMENT (OUTPATIENT)
Dept: OBSTETRICS AND GYNECOLOGY | Facility: CLINIC | Age: 44
End: 2024-07-31
Payer: COMMERCIAL

## 2024-08-08 ENCOUNTER — APPOINTMENT (OUTPATIENT)
Dept: PRIMARY CARE | Facility: CLINIC | Age: 44
End: 2024-08-08
Payer: COMMERCIAL

## 2024-08-08 VITALS — BODY MASS INDEX: 42.52 KG/M2 | DIASTOLIC BLOOD PRESSURE: 88 MMHG | WEIGHT: 240 LBS | SYSTOLIC BLOOD PRESSURE: 136 MMHG

## 2024-08-08 DIAGNOSIS — R79.89 LOW VITAMIN D LEVEL: ICD-10-CM

## 2024-08-08 DIAGNOSIS — E11.9 TYPE 2 DIABETES MELLITUS WITHOUT COMPLICATION, WITHOUT LONG-TERM CURRENT USE OF INSULIN (MULTI): ICD-10-CM

## 2024-08-08 DIAGNOSIS — I10 PRIMARY HYPERTENSION: ICD-10-CM

## 2024-08-08 DIAGNOSIS — D50.9 IRON DEFICIENCY ANEMIA, UNSPECIFIED IRON DEFICIENCY ANEMIA TYPE: ICD-10-CM

## 2024-08-08 DIAGNOSIS — Z00.00 ROUTINE MEDICAL EXAM: Primary | ICD-10-CM

## 2024-08-08 DIAGNOSIS — E03.9 HYPOTHYROIDISM, UNSPECIFIED TYPE: ICD-10-CM

## 2024-08-08 DIAGNOSIS — E78.2 MIXED HYPERLIPIDEMIA: ICD-10-CM

## 2024-08-08 DIAGNOSIS — E55.9 VITAMIN D INSUFFICIENCY: ICD-10-CM

## 2024-08-08 PROCEDURE — 3079F DIAST BP 80-89 MM HG: CPT | Performed by: INTERNAL MEDICINE

## 2024-08-08 PROCEDURE — 99396 PREV VISIT EST AGE 40-64: CPT | Performed by: INTERNAL MEDICINE

## 2024-08-08 PROCEDURE — 3044F HG A1C LEVEL LT 7.0%: CPT | Performed by: INTERNAL MEDICINE

## 2024-08-08 PROCEDURE — 3048F LDL-C <100 MG/DL: CPT | Performed by: INTERNAL MEDICINE

## 2024-08-08 PROCEDURE — 3075F SYST BP GE 130 - 139MM HG: CPT | Performed by: INTERNAL MEDICINE

## 2024-08-08 RX ORDER — LANCETS
EACH MISCELLANEOUS
Qty: 100 EACH | Refills: 1 | Status: SHIPPED | OUTPATIENT
Start: 2024-08-08

## 2024-08-08 RX ORDER — ISOPROPYL ALCOHOL 70 ML/100ML
SWAB TOPICAL
Qty: 100 EACH | Refills: 1 | Status: SHIPPED | OUTPATIENT
Start: 2024-08-08

## 2024-08-08 RX ORDER — CHOLECALCIFEROL (VITAMIN D3) 50 MCG
50 TABLET ORAL DAILY
Qty: 30 TABLET | Refills: 0 | Status: SHIPPED | OUTPATIENT
Start: 2024-08-08

## 2024-08-08 RX ORDER — LEVOTHYROXINE SODIUM 112 UG/1
112 TABLET ORAL DAILY
Qty: 90 TABLET | Refills: 1 | Status: SHIPPED | OUTPATIENT
Start: 2024-08-08

## 2024-08-08 RX ORDER — IBUPROFEN 200 MG
CAPSULE ORAL
Qty: 100 STRIP | Refills: 1 | Status: SHIPPED | OUTPATIENT
Start: 2024-08-08

## 2024-08-08 NOTE — PROGRESS NOTES
Primary care office Note      Name: Tsering Carranza, Age: 43 y.o., Gender: female, MRN: [id number]   Pharmacy:   [id number] - [place], [place] - [address]  [address] [address]  Phone: [phone] Fax: [phone]    [facility] - [place], [place] - [address]  [address]  [facility] [address]  Phone: [phone] Fax: [phone]    [facility] - [place], [place] - [address], [address]  [address]  [facility] [address]  Phone: [phone] Fax: [phone]     PCP: Mayela Torres        Subjective:   Chief Complaint   Patient presents with    Annual Exam        Tsering Carranza is a 43 y.o. female who presented to the clinic today for follow up     HPI:   Tsering Carranza is a 43 y.o. female  Pt presents for follow up. She is feeling well and no complaints.      The patient denies headaches, lightheadedness, changes in speech/vision, photophobia, dysphagia, diaphoresis, Chest pain, dyspnea, Abdominal pain, recent falls or trauma, and changes in bowel/urinary habits.     ROS:   12 points review of system is negative excepted as stated above in the HPI.    Medical History      PMH:    has a past medical history of Allergic rhinitis (09/17/2016), Calculus of kidney (06/16/2022), Depression, History of DVT (deep vein thrombosis), Hypothyroidism (01/31/2014), Polycystic ovarian syndrome (06/15/2020), Type 2 diabetes mellitus (Multi), and Type 2 diabetes mellitus without complications (Multi) (08/26/2022).   Allergies:   Allergies   Allergen Reactions    Azithromycin Dizziness and Other    Bee Pollen Itching    Nut - Unspecified Itching    Pollen Extracts Itching    Prednisone Other    Strawberry Itching    Doxycycline Hives    Duloxetine Other and Unknown     change in mental heart racing elevated BP    Fluoxetine Hcl Anxiety and Unknown     Panic, high anxiety, fatigue    Grass Pollen Itching and Rash    House Dust  Itching and Rash      Surgical Hx:   Past Surgical History:   Procedure Laterality Date    OTHER SURGICAL HISTORY  05/16/2022    Tonsillectomy    OTHER SURGICAL HISTORY  09/14/2022    Cholecystectomy laparoscopic    OTHER SURGICAL HISTORY  11/20/2020    Lithotripsy      Social HX:   Social History     Tobacco Use    Smoking status: Never    Smokeless tobacco: Never   Substance Use Topics    Alcohol use: Never    Drug use: Never        MEDS:   Current Outpatient Medications   Medication Instructions    alcohol swabs pads, medicated To test once daily    blood sugar diagnostic (Blood Glucose Test) strip To test once daily    cholecalciferol (VITAMIN D-3) 50 mcg, oral, Daily    cholecalciferol, vitamin D3, (VITAMIN D3 ORAL) oral    efinaconazole (Jublia) 10 % solution with applicator Apply to affected nails nightly.    EPINEPHrine (EpiPen 2-Wilfrido) 0.3 mg/0.3 mL injection syringe Inject into lateral thigh for anaphylaxis. Call 911 after use. May repeat after 5 minutes if not effective.    EPINEPHrine 0.3 mg/0.3 mL injection syringe 1 Syringe, injection, Once    ferrous fumarate-vitamin C (Oswaldo-Sequeles 65-25) 1 tablet, oral, 3 times daily (morning, midday, late afternoon), Do not crush, chew, or split.    ketoconazole (NIZOral) 2 % cream Apply to soles and in between the toes once daily for maintenance.    lancets misc To test once daily    levothyroxine (SYNTHROID, LEVOXYL) 112 mcg, oral, Daily    olopatadine (Pataday) 0.2 % ophthalmic solution 1 drop, ophthalmic (eye), Daily PRN    triamcinolone (Kenalog) 0.1 % ointment Apply daily to itchy area of vulva x 2 weeks or until itching resolved, then do maintenance dosing at twice weekly x 3 months.        Objective Data     Objective:   Visit Vitals  /88        Physical Examination:   GE; sitting comfortably in a chair, in NAD  HEENT; AT/NC, no conjunctival pallor, anicteric sclera  Neck; Supple neck, no LAD/JVD  Chest; CTAbl, no adventitious sounds  CVS; s1 and s2  only no MGR, RRR  Ext; no cyanosis/clubbing/edema, pulses intact  Neuro; Aox3  Psych; normal mood     Last Labs:   CBC:   WBC   Date Value Ref Range Status   08/12/2024 8.4 4.4 - 11.3 x10*3/uL Final   12/06/2023 9.3 4.4 - 11.3 x10*3/uL Final   11/28/2023 8.2 4.4 - 11.3 x10*3/uL Final     Hemoglobin   Date Value Ref Range Status   08/12/2024 12.8 12.0 - 16.0 g/dL Final   12/06/2023 12.5 12.0 - 16.0 g/dL Final   11/28/2023 13.1 12.0 - 16.0 g/dL Final     MCV   Date Value Ref Range Status   08/12/2024 88 80 - 100 fL Final   12/06/2023 86 80 - 100 fL Final   11/28/2023 87 80 - 100 fL Final     Platelets   Date Value Ref Range Status   08/12/2024 298 150 - 450 x10*3/uL Final   12/06/2023 339 150 - 450 x10*3/uL Final   11/28/2023 352 150 - 450 x10*3/uL Final      CMP:   Sodium   Date Value Ref Range Status   08/12/2024 139 136 - 145 mmol/L Final   12/06/2023 139 136 - 145 mmol/L Final   11/28/2023 139 136 - 145 mmol/L Final     Potassium   Date Value Ref Range Status   08/12/2024 4.3 3.5 - 5.3 mmol/L Final   12/06/2023 4.1 3.5 - 5.3 mmol/L Final     Comment:     MILD HEMOLYSIS DETECTED. The result may be falsely elevated due to hemolysis or other interferents. Clinical correlation is recommended. Repeat testing may be considered.   11/28/2023 3.5 3.5 - 5.3 mmol/L Final     Chloride   Date Value Ref Range Status   08/12/2024 103 98 - 107 mmol/L Final   12/06/2023 105 98 - 107 mmol/L Final   11/28/2023 104 98 - 107 mmol/L Final     Urea Nitrogen   Date Value Ref Range Status   08/12/2024 13 6 - 23 mg/dL Final   01/18/2024 12 6 - 23 mg/dL Final   12/06/2023 11 6 - 23 mg/dL Final     Creatinine   Date Value Ref Range Status   08/12/2024 0.70 0.50 - 1.05 mg/dL Final   01/18/2024 0.73 0.50 - 1.05 mg/dL Final   12/06/2023 0.73 0.50 - 1.05 mg/dL Final     eGFR   Date Value Ref Range Status   08/12/2024 >90 >60 mL/min/1.73m*2 Final     Comment:     Calculations of estimated GFR are performed using the 2021 CKD-EPI Study Refit  equation without the race variable for the IDMS-Traceable creatinine methods.  https://jasn.asnjournals.org/content/early/2021/09/22/ASN.[email or web address]   01/18/2024 >90 >60 mL/min/1.73m*2 Final     Comment:     Calculations of estimated GFR are performed using the 2021 CKD-EPI Study Refit equation without the race variable for the IDMS-Traceable creatinine methods.  https://jasn.asnjournals.org/content/early/2021/09/22/ASN.[email or web address]   12/06/2023 >90 >60 mL/min/1.73m*2 Final     Comment:     Calculations of estimated GFR are performed using the 2021 CKD-EPI Study Refit equation without the race variable for the IDMS-Traceable creatinine methods.  https://jasn.asnjournals.org/content/early/2021/09/22/ASN.[email or web address]      A1c:   Hemoglobin A1C   Date Value Ref Range Status   08/12/2024 6.7 (H) see below % Final   07/03/2023 5.4 % Final     Comment:          Diagnosis of Diabetes-Adults   Non-Diabetic: < or = 5.6%   Increased risk for developing diabetes: 5.7-6.4%   Diagnostic of diabetes: > or = 6.5%  .       Monitoring of Diabetes                Age (y)     Therapeutic Goal (%)   Adults:          >18           <7.0   Pediatrics:    13-18           <7.5                   7-12           <8.0                   0- 6            7.5-8.5   American Diabetes Association. Diabetes Care 33(S1), Jan 2010.     08/26/2022 5.2 % Final     Comment:          Diagnosis of Diabetes-Adults   Non-Diabetic: < or = 5.6%   Increased risk for developing diabetes: 5.7-6.4%   Diagnostic of diabetes: > or = 6.5%  .       Monitoring of Diabetes                Age (y)     Therapeutic Goal (%)   Adults:          >18           <7.0   Pediatrics:    13-18           <7.5                   7-12           <8.0                   0- 6            7.5-8.5   American Diabetes Association. Diabetes Care 33(S1), Jan 2010.          Other labs;   Vit D:   Vitamin D, 25-Hydroxy, Total   Date Value Ref Range Status   08/12/2024 30 30 - 100 ng/mL Final     Vitamin D,  25-Hydroxy   Date Value Ref Range Status   07/03/2023 26 (A) ng/mL Final     Comment:     .  DEFICIENCY:         < 20   NG/ML  INSUFFICIENCY:      20-29  NG/ML  SUFFICIENCY:         NG/ML    THIS ASSAY ACCURATELY QUANTIFIES THE SUM OF  VITAMIN D3, 25-HYDROXY AND VIT D2,25-HYDROXY.     02/17/2023 29 (A) ng/mL Final     Comment:     .  DEFICIENCY:         < 20   NG/ML  INSUFFICIENCY:      20-29  NG/ML  SUFFICIENCY:         NG/ML    THIS ASSAY ACCURATELY QUANTIFIES THE SUM OF  VITAMIN D3, 25-HYDROXY AND VIT D2,25-HYDROXY.        TSH:  Thyroid Stimulating Hormone   Date Value Ref Range Status   08/12/2024 5.51 (H) 0.44 - 3.98 mIU/L Final     TSH   Date Value Ref Range Status   07/03/2023 2.58 0.44 - 3.98 mIU/L Final     Comment:      TSH testing is performed using different testing    methodology at [facility] than at other    [facility]. Direct result comparisons should    only be made within the same method.     02/17/2023 1.41 0.44 - 3.98 mIU/L Final     Comment:      TSH testing is performed using different testing    methodology at [facility] than at other    [facility]. Direct result comparisons should    only be made within the same method.          Assessment and Plan     Problem List Items Addressed This Visit       Hypertension    Relevant Orders    CBC and Auto Differential (Completed)    Comprehensive Metabolic Panel (Completed)    Lipid Panel (Completed)    Hypothyroidism    Relevant Medications    levothyroxine (Synthroid, Levoxyl) 112 mcg tablet    Other Relevant Orders    TSH (Completed)    Iron deficiency anemia    Relevant Orders    Ferritin (Completed)    Iron and TIBC (Completed)    Vitamin B12 (Completed)    Folate (Completed)    Low vitamin D level - Primary    Relevant Medications    cholecalciferol (Vitamin D-3) 50 MCG (2000 UT) tablet    Other Relevant Orders    Vitamin D 25-Hydroxy,Total (for eval of Vitamin D levels) (Completed)    Vitamin D  insufficiency    Mixed hyperlipidemia    Relevant Orders    Lipid Panel (Completed)     Other Visit Diagnoses       Type 2 diabetes mellitus without complication, without long-term current use of insulin (Multi)        Relevant Medications    blood sugar diagnostic (Blood Glucose Test) strip    lancets misc    alcohol swabs pads, medicated    Other Relevant Orders    Hemoglobin A1C (Completed)    Referral to Nutrition Services            Mayela Torres DO

## 2024-08-09 ENCOUNTER — APPOINTMENT (OUTPATIENT)
Dept: RADIOLOGY | Facility: CLINIC | Age: 44
End: 2024-08-09
Payer: COMMERCIAL

## 2024-08-12 ENCOUNTER — LAB (OUTPATIENT)
Dept: LAB | Facility: LAB | Age: 44
End: 2024-08-12
Payer: COMMERCIAL

## 2024-08-12 ENCOUNTER — APPOINTMENT (OUTPATIENT)
Dept: RADIOLOGY | Facility: HOSPITAL | Age: 44
End: 2024-08-12
Payer: COMMERCIAL

## 2024-08-12 ENCOUNTER — HOSPITAL ENCOUNTER (EMERGENCY)
Facility: HOSPITAL | Age: 44
Discharge: HOME | End: 2024-08-12
Attending: EMERGENCY MEDICINE
Payer: COMMERCIAL

## 2024-08-12 ENCOUNTER — OFFICE VISIT (OUTPATIENT)
Dept: OBSTETRICS AND GYNECOLOGY | Facility: CLINIC | Age: 44
End: 2024-08-12
Payer: COMMERCIAL

## 2024-08-12 ENCOUNTER — HOSPITAL ENCOUNTER (OUTPATIENT)
Dept: RADIOLOGY | Facility: CLINIC | Age: 44
Discharge: HOME | End: 2024-08-12
Payer: COMMERCIAL

## 2024-08-12 ENCOUNTER — TELEPHONE (OUTPATIENT)
Dept: PRIMARY CARE | Facility: CLINIC | Age: 44
End: 2024-08-12

## 2024-08-12 VITALS
WEIGHT: 242 LBS | HEART RATE: 75 BPM | BODY MASS INDEX: 42.88 KG/M2 | DIASTOLIC BLOOD PRESSURE: 89 MMHG | SYSTOLIC BLOOD PRESSURE: 144 MMHG | HEIGHT: 63 IN

## 2024-08-12 VITALS
OXYGEN SATURATION: 97 % | HEART RATE: 76 BPM | SYSTOLIC BLOOD PRESSURE: 177 MMHG | HEIGHT: 63 IN | WEIGHT: 237 LBS | DIASTOLIC BLOOD PRESSURE: 93 MMHG | TEMPERATURE: 97.7 F | BODY MASS INDEX: 41.99 KG/M2 | RESPIRATION RATE: 18 BRPM

## 2024-08-12 DIAGNOSIS — E03.9 HYPOTHYROIDISM, UNSPECIFIED TYPE: ICD-10-CM

## 2024-08-12 DIAGNOSIS — E78.2 MIXED HYPERLIPIDEMIA: ICD-10-CM

## 2024-08-12 DIAGNOSIS — E11.9 TYPE 2 DIABETES MELLITUS WITHOUT COMPLICATION, WITHOUT LONG-TERM CURRENT USE OF INSULIN (MULTI): ICD-10-CM

## 2024-08-12 DIAGNOSIS — D50.9 IRON DEFICIENCY ANEMIA, UNSPECIFIED IRON DEFICIENCY ANEMIA TYPE: ICD-10-CM

## 2024-08-12 DIAGNOSIS — N89.8 VAGINAL DISCHARGE: ICD-10-CM

## 2024-08-12 DIAGNOSIS — R79.89 LOW VITAMIN D LEVEL: ICD-10-CM

## 2024-08-12 DIAGNOSIS — L28.0 LICHEN SIMPLEX CHRONICUS: ICD-10-CM

## 2024-08-12 DIAGNOSIS — M79.605 PAIN OF LEFT LOWER EXTREMITY: Primary | ICD-10-CM

## 2024-08-12 DIAGNOSIS — E03.8 OTHER SPECIFIED HYPOTHYROIDISM: ICD-10-CM

## 2024-08-12 DIAGNOSIS — I10 PRIMARY HYPERTENSION: ICD-10-CM

## 2024-08-12 DIAGNOSIS — N20.0 STONE IN KIDNEY: ICD-10-CM

## 2024-08-12 DIAGNOSIS — N76.1 CHRONIC VAGINITIS: ICD-10-CM

## 2024-08-12 DIAGNOSIS — Z01.411 ENCOUNTER FOR WELL WOMAN EXAM WITH ABNORMAL FINDINGS: Primary | ICD-10-CM

## 2024-08-12 LAB
25(OH)D3 SERPL-MCNC: 30 NG/ML (ref 30–100)
ALBUMIN SERPL BCP-MCNC: 4.1 G/DL (ref 3.4–5)
ALP SERPL-CCNC: 70 U/L (ref 33–110)
ALT SERPL W P-5'-P-CCNC: 20 U/L (ref 7–45)
ANION GAP SERPL CALC-SCNC: 12 MMOL/L (ref 10–20)
AST SERPL W P-5'-P-CCNC: 15 U/L (ref 9–39)
BASOPHILS # BLD AUTO: 0.04 X10*3/UL (ref 0–0.1)
BASOPHILS NFR BLD AUTO: 0.5 %
BILIRUB SERPL-MCNC: 0.4 MG/DL (ref 0–1.2)
BUN SERPL-MCNC: 13 MG/DL (ref 6–23)
CALCIUM SERPL-MCNC: 9 MG/DL (ref 8.6–10.6)
CHLORIDE SERPL-SCNC: 103 MMOL/L (ref 98–107)
CHOLEST SERPL-MCNC: 172 MG/DL (ref 0–199)
CHOLESTEROL/HDL RATIO: 3.6
CO2 SERPL-SCNC: 28 MMOL/L (ref 21–32)
CREAT SERPL-MCNC: 0.7 MG/DL (ref 0.5–1.05)
EGFRCR SERPLBLD CKD-EPI 2021: >90 ML/MIN/1.73M*2
EOSINOPHIL # BLD AUTO: 0.14 X10*3/UL (ref 0–0.7)
EOSINOPHIL NFR BLD AUTO: 1.7 %
ERYTHROCYTE [DISTWIDTH] IN BLOOD BY AUTOMATED COUNT: 12.7 % (ref 11.5–14.5)
EST. AVERAGE GLUCOSE BLD GHB EST-MCNC: 146 MG/DL
FERRITIN SERPL-MCNC: 42 NG/ML (ref 8–150)
FOLATE SERPL-MCNC: 11.8 NG/ML
GLUCOSE SERPL-MCNC: 150 MG/DL (ref 74–99)
HBA1C MFR BLD: 6.7 %
HCT VFR BLD AUTO: 39.4 % (ref 36–46)
HDLC SERPL-MCNC: 48.4 MG/DL
HGB BLD-MCNC: 12.8 G/DL (ref 12–16)
IMM GRANULOCYTES # BLD AUTO: 0.03 X10*3/UL (ref 0–0.7)
IMM GRANULOCYTES NFR BLD AUTO: 0.4 % (ref 0–0.9)
IRON SATN MFR SERPL: 35 % (ref 25–45)
IRON SERPL-MCNC: 119 UG/DL (ref 35–150)
LDLC SERPL CALC-MCNC: 93 MG/DL
LYMPHOCYTES # BLD AUTO: 2.75 X10*3/UL (ref 1.2–4.8)
LYMPHOCYTES NFR BLD AUTO: 32.8 %
MCH RBC QN AUTO: 28.6 PG (ref 26–34)
MCHC RBC AUTO-ENTMCNC: 32.5 G/DL (ref 32–36)
MCV RBC AUTO: 88 FL (ref 80–100)
MONOCYTES # BLD AUTO: 0.47 X10*3/UL (ref 0.1–1)
MONOCYTES NFR BLD AUTO: 5.6 %
NEUTROPHILS # BLD AUTO: 4.95 X10*3/UL (ref 1.2–7.7)
NEUTROPHILS NFR BLD AUTO: 59 %
NON HDL CHOLESTEROL: 124 MG/DL (ref 0–149)
NRBC BLD-RTO: 0 /100 WBCS (ref 0–0)
PLATELET # BLD AUTO: 298 X10*3/UL (ref 150–450)
POTASSIUM SERPL-SCNC: 4.3 MMOL/L (ref 3.5–5.3)
PROT SERPL-MCNC: 6.7 G/DL (ref 6.4–8.2)
RBC # BLD AUTO: 4.48 X10*6/UL (ref 4–5.2)
SODIUM SERPL-SCNC: 139 MMOL/L (ref 136–145)
T4 FREE SERPL-MCNC: 1.25 NG/DL (ref 0.78–1.48)
TIBC SERPL-MCNC: 339 UG/DL (ref 240–445)
TRIGL SERPL-MCNC: 154 MG/DL (ref 0–149)
TSH SERPL-ACNC: 5.51 MIU/L (ref 0.44–3.98)
UIBC SERPL-MCNC: 220 UG/DL (ref 110–370)
VIT B12 SERPL-MCNC: 359 PG/ML (ref 211–911)
VLDL: 31 MG/DL (ref 0–40)
WBC # BLD AUTO: 8.4 X10*3/UL (ref 4.4–11.3)

## 2024-08-12 PROCEDURE — 76770 US EXAM ABDO BACK WALL COMP: CPT

## 2024-08-12 PROCEDURE — 82306 VITAMIN D 25 HYDROXY: CPT

## 2024-08-12 PROCEDURE — 82728 ASSAY OF FERRITIN: CPT

## 2024-08-12 PROCEDURE — 83540 ASSAY OF IRON: CPT

## 2024-08-12 PROCEDURE — 80061 LIPID PANEL: CPT

## 2024-08-12 PROCEDURE — 99214 OFFICE O/P EST MOD 30 MIN: CPT | Performed by: OBSTETRICS & GYNECOLOGY

## 2024-08-12 PROCEDURE — 76770 US EXAM ABDO BACK WALL COMP: CPT | Performed by: STUDENT IN AN ORGANIZED HEALTH CARE EDUCATION/TRAINING PROGRAM

## 2024-08-12 PROCEDURE — 83036 HEMOGLOBIN GLYCOSYLATED A1C: CPT

## 2024-08-12 PROCEDURE — 99386 PREV VISIT NEW AGE 40-64: CPT | Performed by: OBSTETRICS & GYNECOLOGY

## 2024-08-12 PROCEDURE — 1036F TOBACCO NON-USER: CPT | Performed by: OBSTETRICS & GYNECOLOGY

## 2024-08-12 PROCEDURE — 82607 VITAMIN B-12: CPT

## 2024-08-12 PROCEDURE — 93971 EXTREMITY STUDY: CPT

## 2024-08-12 PROCEDURE — 82746 ASSAY OF FOLIC ACID SERUM: CPT

## 2024-08-12 PROCEDURE — 99284 EMERGENCY DEPT VISIT MOD MDM: CPT | Mod: 25

## 2024-08-12 PROCEDURE — 3077F SYST BP >= 140 MM HG: CPT | Performed by: OBSTETRICS & GYNECOLOGY

## 2024-08-12 PROCEDURE — 87109 MYCOPLASMA: CPT | Performed by: OBSTETRICS & GYNECOLOGY

## 2024-08-12 PROCEDURE — 93971 EXTREMITY STUDY: CPT | Performed by: RADIOLOGY

## 2024-08-12 PROCEDURE — 84443 ASSAY THYROID STIM HORMONE: CPT

## 2024-08-12 PROCEDURE — 3008F BODY MASS INDEX DOCD: CPT | Performed by: OBSTETRICS & GYNECOLOGY

## 2024-08-12 PROCEDURE — 84439 ASSAY OF FREE THYROXINE: CPT

## 2024-08-12 PROCEDURE — 85025 COMPLETE CBC W/AUTO DIFF WBC: CPT

## 2024-08-12 PROCEDURE — 36415 COLL VENOUS BLD VENIPUNCTURE: CPT

## 2024-08-12 PROCEDURE — 83550 IRON BINDING TEST: CPT

## 2024-08-12 PROCEDURE — 87205 SMEAR GRAM STAIN: CPT | Performed by: OBSTETRICS & GYNECOLOGY

## 2024-08-12 PROCEDURE — 80053 COMPREHEN METABOLIC PANEL: CPT

## 2024-08-12 PROCEDURE — 99396 PREV VISIT EST AGE 40-64: CPT | Performed by: OBSTETRICS & GYNECOLOGY

## 2024-08-12 PROCEDURE — 3079F DIAST BP 80-89 MM HG: CPT | Performed by: OBSTETRICS & GYNECOLOGY

## 2024-08-12 RX ORDER — TRIAMCINOLONE ACETONIDE 1 MG/G
OINTMENT TOPICAL
Qty: 60 G | Refills: 2 | Status: SHIPPED | OUTPATIENT
Start: 2024-08-12

## 2024-08-12 ASSESSMENT — PATIENT HEALTH QUESTIONNAIRE - PHQ9
SUM OF ALL RESPONSES TO PHQ9 QUESTIONS 1 AND 2: 2
2. FEELING DOWN, DEPRESSED OR HOPELESS: SEVERAL DAYS
10. IF YOU CHECKED OFF ANY PROBLEMS, HOW DIFFICULT HAVE THESE PROBLEMS MADE IT FOR YOU TO DO YOUR WORK, TAKE CARE OF THINGS AT HOME, OR GET ALONG WITH OTHER PEOPLE: NOT DIFFICULT AT ALL
1. LITTLE INTEREST OR PLEASURE IN DOING THINGS: SEVERAL DAYS

## 2024-08-12 ASSESSMENT — ENCOUNTER SYMPTOMS
ALLERGIC/IMMUNOLOGIC NEGATIVE: 0
MUSCULOSKELETAL NEGATIVE: 0
CONSTITUTIONAL NEGATIVE: 1
EYES NEGATIVE: 0
CARDIOVASCULAR NEGATIVE: 0
NEUROLOGICAL NEGATIVE: 1
GASTROINTESTINAL NEGATIVE: 0
PSYCHIATRIC NEGATIVE: 1
ENDOCRINE NEGATIVE: 0
HEMATOLOGIC/LYMPHATIC NEGATIVE: 0
RESPIRATORY NEGATIVE: 0

## 2024-08-12 ASSESSMENT — COLUMBIA-SUICIDE SEVERITY RATING SCALE - C-SSRS
6. HAVE YOU EVER DONE ANYTHING, STARTED TO DO ANYTHING, OR PREPARED TO DO ANYTHING TO END YOUR LIFE?: NO
1. IN THE PAST MONTH, HAVE YOU WISHED YOU WERE DEAD OR WISHED YOU COULD GO TO SLEEP AND NOT WAKE UP?: NO
2. HAVE YOU ACTUALLY HAD ANY THOUGHTS OF KILLING YOURSELF?: NO
1. IN THE PAST MONTH, HAVE YOU WISHED YOU WERE DEAD OR WISHED YOU COULD GO TO SLEEP AND NOT WAKE UP?: NO
2. HAVE YOU ACTUALLY HAD ANY THOUGHTS OF KILLING YOURSELF?: NO
6. HAVE YOU EVER DONE ANYTHING, STARTED TO DO ANYTHING, OR PREPARED TO DO ANYTHING TO END YOUR LIFE?: NO

## 2024-08-12 ASSESSMENT — PAIN - FUNCTIONAL ASSESSMENT: PAIN_FUNCTIONAL_ASSESSMENT: 0-10

## 2024-08-12 ASSESSMENT — PAIN DESCRIPTION - FREQUENCY: FREQUENCY: CONSTANT/CONTINUOUS

## 2024-08-12 ASSESSMENT — PAIN DESCRIPTION - PAIN TYPE: TYPE: ACUTE PAIN

## 2024-08-12 ASSESSMENT — PAIN SCALES - GENERAL
PAINLEVEL_OUTOF10: 4
PAINLEVEL: 0-NO PAIN

## 2024-08-12 ASSESSMENT — PAIN DESCRIPTION - DESCRIPTORS
DESCRIPTORS: ACHING
DESCRIPTORS: ACHING

## 2024-08-12 ASSESSMENT — PAIN DESCRIPTION - PROGRESSION: CLINICAL_PROGRESSION: GRADUALLY WORSENING

## 2024-08-12 ASSESSMENT — PAIN DESCRIPTION - LOCATION: LOCATION: LEG

## 2024-08-12 ASSESSMENT — PAIN DESCRIPTION - ONSET: ONSET: PROGRESSIVE

## 2024-08-12 ASSESSMENT — PAIN DESCRIPTION - ORIENTATION: ORIENTATION: LEFT
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
## 2024-08-12 NOTE — ED TRIAGE NOTES
Pt presents to ED from home for left leg pain with some swelling. Patient has a history of DVT and PE. Pt states she sits a lot at work, but does not smoke or take birth control.

## 2024-08-12 NOTE — TELEPHONE ENCOUNTER
Having leg pain.    Has a history of dvt.   Unsure if you want her to just have an ultrasound or if you need to see her.

## 2024-08-13 NOTE — DISCHARGE INSTRUCTIONS
You were seen in the ED for left leg pain. Your ultrasound did not show evidence of a blood clot but was a somewhat limited study. This may be a muscle strain. Monitor symptoms closely, elevate your leg, take tylenol and ibuprofen for pain. Follow up with your primary doctor. You may need a repeat ultrasound in a week if still having symptoms.

## 2024-08-13 NOTE — ED PROVIDER NOTES
Emergency Department Provider Note             History of Present Illness   CC: Leg Pain    History provided by: Patient  Limitations to History: None    HPI:  Tsering Carranza is a 43 y.o. female with history including provoked DVT (no longer on anticoagulation), nephrolithiasis, hypothyroidism, depression, PCOS presenting to the emergency department for left leg pain/swelling. States this started a few days ago without clear etiology. Notes she sits a lot at work. Denies fever, chills, chest pain, dyspnea, paresthesia, weakness, any other symptoms.     ---  Past Medical History:   Diagnosis Date    Allergic rhinitis 09/17/2016    Allergic rhinitis due to dust mite, animal dander, pollen    Calculus of kidney 06/16/2022    Kidney stone on left side    Depression     History of DVT (deep vein thrombosis)     2015 pregnancy    Hypothyroidism 01/31/2014    on Levo    Polycystic ovarian syndrome 06/15/2020    PCOS (polycystic ovarian syndrome)    Type 2 diabetes mellitus (Multi)     Type 2 diabetes mellitus without complications (Multi) 08/26/2022    Diet-controlled diabetes mellitus     Past Surgical History:   Procedure Laterality Date    OTHER SURGICAL HISTORY  05/16/2022    Tonsillectomy    OTHER SURGICAL HISTORY  09/14/2022    Cholecystectomy laparoscopic    OTHER SURGICAL HISTORY  11/20/2020    Lithotripsy       Allergies   Allergen Reactions    Azithromycin Dizziness and Other    Bee Pollen Itching    Nut - Unspecified Itching    Pollen Extracts Itching    Prednisone Other    Strawberry Itching    Doxycycline Hives    Duloxetine Other and Unknown     change in mental heart racing elevated BP    Fluoxetine Hcl Anxiety and Unknown     Panic, high anxiety, fatigue    Grass Pollen Itching and Rash    House Dust Itching and Rash       Physical Exam   Triage vitals:  T 36.5 °C (97.7 °F)  HR 76  BP (!) 177/93  RR 18  O2 97 % None (Room air)    General: awake, well-appearing, no distress  Head: normocephalic,  atraumatic  Eyes: pupils equal, extraocular movements grossly intact, no conjunctival injection or scleral icterus  ENT: nares patent, moist mucous membranes  Neck: supple, trachea midline, no masses  CV: regular rate and rhythm, well-perfused  Resp: breathing is non-labored, speaking in full sentences. Lungs are clear to auscultation bilaterally  GI: soft, non-distended, non-tender, no rebound or guarding  Extremities: trace LLE edema, no gross deformity, ROM major joints intact, no erythema, no open wounds or skin changes.   Neuro: alert, oriented, speech is fluent, face is symmetric, moving all extremities   Psych: Appropriate mood and affect    ED Course & Medical Decision Making   43 y.o. female with history including provoked DVT (no longer on anticoagulation), nephrolithiasis, hypothyroidism, depression, PCOS presenting to the emergency department for left leg pain/swelling. She's well-appearing, no distress. Neurovascularly intact. Trace edema without signs of SSTI. Duplex ultrasound shows no evidence of DVT. She remains stable and comfortable going home. Given return precautions. Advised follow up with his PCP and advised repeat US in 1 week if still having symptoms.     Social Determinants Limiting Care: None identified    Results: Independently reviewed and interpreted by me. Please see ED course and MDM for my full interpretation.     Chronic Medical Conditions Significantly Affecting Care: As documented above in MDM    Patient was discussed with the following consultants/services: None     Care Considerations: As documented above in [facility]    ED Course:  Diagnoses as of 08/15/24 1212   Pain of left lower extremity     Disposition   Discharge    Procedures   Procedures    MD Mayela Saunders MD  08/15/24 1216

## 2024-08-14 LAB
CLUE CELLS VAG LPF-#/AREA: NORMAL /[LPF]
NUGENT SCORE: 2
YEAST VAG WET PREP-#/AREA: NORMAL

## 2024-08-15 ENCOUNTER — TELEPHONE (OUTPATIENT)
Dept: OBSTETRICS AND GYNECOLOGY | Facility: CLINIC | Age: 44
End: 2024-08-15
Payer: COMMERCIAL

## 2024-08-16 ENCOUNTER — TELEPHONE (OUTPATIENT)
Dept: PRIMARY CARE | Facility: CLINIC | Age: 44
End: 2024-08-16
Payer: COMMERCIAL

## 2024-08-17 ENCOUNTER — DOCUMENTATION (OUTPATIENT)
Dept: OPHTHALMOLOGY | Facility: CLINIC | Age: 44
End: 2024-08-17
Payer: COMMERCIAL

## 2024-08-17 ASSESSMENT — REFRACTION_CURRENTRX
OD_DIAMETER: 14.2
OS_SPHERE: -4.75
OS_BRAND: PROCLEAR 1 DAY
OD_BRAND: PROCLEAR 1 DAY
OD_BASECURVE: 8.7
OS_BASECURVE: 8.7
OS_DIAMETER: 14.2
OD_SPHERE: -4.75

## 2024-08-18 LAB — UREAPLASMA/MYCOPLASMA CULTURE: NORMAL

## 2024-08-19 NOTE — TELEPHONE ENCOUNTER
Discussed results with patient. Would like to take antibiotic, but is allergic to doxycycline. Please send different antibiotic for her. Allergies listed were reviewed and are accurate.  Also, I just noticed that lab called last week. They did not receive HSV swab, and cancelled that test. Do you want to offer her to repeat it?

## 2024-08-20 NOTE — TELEPHONE ENCOUNTER
Spoke with patient, states both allergies are significant.  Patient agreeable to try boric acid suppositories.

## 2024-08-21 ENCOUNTER — HOSPITAL ENCOUNTER (OUTPATIENT)
Dept: RADIOLOGY | Facility: CLINIC | Age: 44
Discharge: HOME | End: 2024-08-21
Payer: COMMERCIAL

## 2024-08-21 ENCOUNTER — OFFICE VISIT (OUTPATIENT)
Dept: ORTHOPEDIC SURGERY | Facility: CLINIC | Age: 44
End: 2024-08-21
Payer: COMMERCIAL

## 2024-08-21 DIAGNOSIS — M54.50 LUMBAR PAIN: ICD-10-CM

## 2024-08-21 DIAGNOSIS — M79.605 PAIN AND SWELLING OF LEFT LOWER EXTREMITY: ICD-10-CM

## 2024-08-21 DIAGNOSIS — M54.16 LUMBAR RADICULOPATHY: Primary | ICD-10-CM

## 2024-08-21 DIAGNOSIS — M79.89 PAIN AND SWELLING OF LEFT LOWER EXTREMITY: ICD-10-CM

## 2024-08-21 PROCEDURE — 72100 X-RAY EXAM L-S SPINE 2/3 VWS: CPT | Performed by: RADIOLOGY

## 2024-08-21 PROCEDURE — 72100 X-RAY EXAM L-S SPINE 2/3 VWS: CPT

## 2024-08-21 PROCEDURE — 93971 EXTREMITY STUDY: CPT

## 2024-08-21 PROCEDURE — 1036F TOBACCO NON-USER: CPT | Performed by: ORTHOPAEDIC SURGERY

## 2024-08-21 PROCEDURE — 99203 OFFICE O/P NEW LOW 30 MIN: CPT | Performed by: ORTHOPAEDIC SURGERY

## 2024-08-21 PROCEDURE — 93971 EXTREMITY STUDY: CPT | Performed by: STUDENT IN AN ORGANIZED HEALTH CARE EDUCATION/TRAINING PROGRAM

## 2024-08-21 NOTE — PROGRESS NOTES
Subjective    Patient ID: Tsering Carranza is a 43 y.o. female.    Chief Complaint: Pain of the Left Lower Leg (Nki/Pain in knee and calf after lifting something heavy 8/8/24/+stiffness and weakness)       43-year-old female presents for evaluation of left lower extremity pain ongoing for the past 10+ days.  She does not recall any specific injury but she noted the discomfort a day after she was lifting a heavy dresser.  She has noted some degree of swelling down to include her left lower leg as well as foot and ankle.  She feels there is some degree of stiffness in her leg and weakness.  She does have a history of sciatica.  Of note she also has a history of a DVT back in 2014 when she was pregnant and then again in 2022.  The 2022 DVT ended up resulting in a PE as well.  She was on Lovenox for 6 months but no longer is on anticoagulant therapy.  Presently the patient denies any shortness of breath or chest pain    This patient's past medical, social, and family history were reviewed as well as a review of systems including updates on the patient's information encounter sheet    Physical Examination  Constitutional: Patient's height and weight reviewed, appears well kempt  Psychiatric: Alert and oriented ×3, appropriate mood and behavior  Pulmonary: Breathing appears nonlabored, no apparent distress  Lymphatic: No appreciable lymphadenopathy to both the upper and lower extremities  Skin: No open lesions, rashes, ulcerations  Neurologic: Gross motor and sensory exam appear intact (except for abnormalities noted in the below muscle skeletal exam)    Musculoskeletal: There is satisfactory range of motion left hip without groin or thigh pain elicited.  The left knee reveals no effusion.  There is full extension of the knee and flexion of 125 degrees.  No gross ligamentous instability.  Negative Don examination.  There is some swelling of both lower extremities from mid shin distally.  This is somewhat greater  on the left lower extremity than the right.  Slight pitting edema noted down at the level of the ankle.    X-rays of the lumbar spine demonstrate to spaces are reasonably well-maintained.  No evidence of spondylolisthesis    Assessment: Left lower extremity pain potentially related to lumbar radiculopathy although with the patient's history of a DVT I think it is important that we get an ultrasound to rule out a DVT today.   I have explained to her that if her ultrasound is positive the pain in her leg may be related to the DVT.  All questions were answered.  We are helping her arrange the ultrasound today.          Current Outpatient Medications:     alcohol swabs pads, medicated, To test once daily, Disp: 100 each, Rfl: 1    blood sugar diagnostic (Blood Glucose Test) strip, To test once daily, Disp: 100 strip, Rfl: 1    cholecalciferol (Vitamin D-3) 50 MCG (2000 UT) tablet, Take 1 tablet (50 mcg) by mouth once daily., Disp: 30 tablet, Rfl: 0    cholecalciferol, vitamin D3, (VITAMIN D3 ORAL), Take by mouth., Disp: , Rfl:     efinaconazole (Jublia) 10 % solution with applicator, Apply to affected nails nightly., Disp: 8 mL, Rfl: 3    EPINEPHrine (EpiPen 2-Wilfrido) 0.3 mg/0.3 mL injection syringe, Inject into lateral thigh for anaphylaxis. Call 911 after use. May repeat after 5 minutes if not effective., Disp: 2 each, Rfl: 1    EPINEPHrine 0.3 mg/0.3 mL injection syringe, Inject 0.3 mL (0.3 mg) as directed 1 time., Disp: , Rfl:     ferrous fumarate-vitamin C (Oswaldo-Sequeles 65-25), Take 1 tablet by mouth 3 times a day with meals. Do not crush, chew, or split., Disp: , Rfl:     ketoconazole (NIZOral) 2 % cream, Apply to soles and in between the toes once daily for maintenance., Disp: 60 g, Rfl: 3    lancets misc, To test once daily, Disp: 100 each, Rfl: 1    levothyroxine (Synthroid, Levoxyl) 112 mcg tablet, Take 1 tablet (112 mcg) by mouth once daily., Disp: 90 tablet, Rfl: 1    olopatadine (Pataday) 0.2 % ophthalmic  solution, Administer 1 drop into affected eye(s) once daily as needed for allergies (itchy eyes)., Disp: 2.5 mL, Rfl: 5    triamcinolone (Kenalog) 0.1 % ointment, Apply daily to itchy area of vulva x 2 weeks or until itching resolved, then do maintenance dosing at twice weekly x 3 months., Disp: 60 g, Rfl: 2

## 2024-08-29 ENCOUNTER — APPOINTMENT (OUTPATIENT)
Dept: PRIMARY CARE | Facility: CLINIC | Age: 44
End: 2024-08-29
Payer: COMMERCIAL

## 2024-08-29 ENCOUNTER — TELEMEDICINE (OUTPATIENT)
Dept: PRIMARY CARE | Facility: CLINIC | Age: 44
End: 2024-08-29
Payer: COMMERCIAL

## 2024-08-29 DIAGNOSIS — I82.4Z2 ACUTE DEEP VEIN THROMBOSIS (DVT) OF DISTAL VEIN OF LEFT LOWER EXTREMITY (MULTI): Primary | ICD-10-CM

## 2024-08-29 DIAGNOSIS — E03.9 HYPOTHYROIDISM, UNSPECIFIED TYPE: ICD-10-CM

## 2024-08-29 DIAGNOSIS — E11.9 TYPE 2 DIABETES MELLITUS WITHOUT COMPLICATION, WITHOUT LONG-TERM CURRENT USE OF INSULIN (MULTI): ICD-10-CM

## 2024-08-29 PROCEDURE — 3048F LDL-C <100 MG/DL: CPT | Performed by: INTERNAL MEDICINE

## 2024-08-29 PROCEDURE — 1036F TOBACCO NON-USER: CPT | Performed by: INTERNAL MEDICINE

## 2024-08-29 PROCEDURE — 99214 OFFICE O/P EST MOD 30 MIN: CPT | Performed by: INTERNAL MEDICINE

## 2024-08-29 PROCEDURE — 3044F HG A1C LEVEL LT 7.0%: CPT | Performed by: INTERNAL MEDICINE

## 2024-08-29 RX ORDER — IBUPROFEN 200 MG
CAPSULE ORAL
Qty: 100 STRIP | Refills: 1 | Status: SHIPPED | OUTPATIENT
Start: 2024-08-29

## 2024-08-29 RX ORDER — LEVOTHYROXINE SODIUM 125 UG/1
125 TABLET ORAL DAILY
Qty: 30 TABLET | Refills: 1 | Status: SHIPPED | OUTPATIENT
Start: 2024-08-29

## 2024-08-29 RX ORDER — ENOXAPARIN SODIUM 100 MG/ML
100 INJECTION SUBCUTANEOUS EVERY 12 HOURS
Start: 2024-08-29

## 2024-08-29 NOTE — PROGRESS NOTES
Subjective   Patient ID: Tsering Carranza is a 43 y.o. female who presents via virtual visit for Follow-up.  An interactive audio and video telecommunication system which permits real time communications between the patient (at the originating site) and provider (at the distant site) was utilized to provide this telehealth service.    Verbal consent was requested and obtained from the patient on the day of encounter.  HPI  Pt presents for follow up.  She was seen in the ER last night for leg pain. She was found to have another DVT.  Pt was off treatment bc it was provoked. This one likely unprovoked. Started on lovenox 100 mg BID.  Eliquis in the past and pt has AE.  Heme/onc workup was negative. Would like to stay on lovenox for now bc she knows she is able to tolerate it.    Review of Systems   All other systems reviewed and are negative.    Assessment/Plan     Problem List Items Addressed This Visit       Hypothyroidism    Relevant Medications    levothyroxine (Synthroid, Levoxyl) 125 mcg tablet    Other Relevant Orders    TSH     Other Visit Diagnoses       Acute deep vein thrombosis (DVT) of distal vein of left lower extremity (Multi)    -  Primary    Relevant Medications    enoxaparin (Lovenox) 100 mg/mL syringe    Other Relevant Orders    Referral to Hematology and Oncology    Type 2 diabetes mellitus without complication, without long-term current use of insulin (Multi)        Relevant Medications    blood sugar diagnostic (Blood Glucose Test) strip

## 2024-08-30 ENCOUNTER — APPOINTMENT (OUTPATIENT)
Dept: CARDIOLOGY | Facility: HOSPITAL | Age: 44
End: 2024-08-30
Payer: COMMERCIAL

## 2024-08-30 PROCEDURE — 93005 ELECTROCARDIOGRAM TRACING: CPT

## 2024-08-30 PROCEDURE — 99283 EMERGENCY DEPT VISIT LOW MDM: CPT

## 2024-08-30 ASSESSMENT — PAIN SCALES - GENERAL: PAINLEVEL_OUTOF10: 0 - NO PAIN

## 2024-08-30 ASSESSMENT — COLUMBIA-SUICIDE SEVERITY RATING SCALE - C-SSRS
1. IN THE PAST MONTH, HAVE YOU WISHED YOU WERE DEAD OR WISHED YOU COULD GO TO SLEEP AND NOT WAKE UP?: NO
2. HAVE YOU ACTUALLY HAD ANY THOUGHTS OF KILLING YOURSELF?: NO
6. HAVE YOU EVER DONE ANYTHING, STARTED TO DO ANYTHING, OR PREPARED TO DO ANYTHING TO END YOUR LIFE?: NO

## 2024-08-30 ASSESSMENT — PAIN - FUNCTIONAL ASSESSMENT: PAIN_FUNCTIONAL_ASSESSMENT: 0-10

## 2024-08-31 ENCOUNTER — HOSPITAL ENCOUNTER (EMERGENCY)
Facility: HOSPITAL | Age: 44
Discharge: HOME | End: 2024-08-31
Payer: COMMERCIAL

## 2024-08-31 ENCOUNTER — APPOINTMENT (OUTPATIENT)
Dept: RADIOLOGY | Facility: HOSPITAL | Age: 44
End: 2024-08-31
Payer: COMMERCIAL

## 2024-08-31 VITALS
OXYGEN SATURATION: 97 % | HEIGHT: 67 IN | BODY MASS INDEX: 37.2 KG/M2 | HEART RATE: 78 BPM | DIASTOLIC BLOOD PRESSURE: 100 MMHG | SYSTOLIC BLOOD PRESSURE: 156 MMHG | RESPIRATION RATE: 18 BRPM | WEIGHT: 237 LBS | TEMPERATURE: 98 F

## 2024-08-31 DIAGNOSIS — R00.2 PALPITATIONS: Primary | ICD-10-CM

## 2024-08-31 LAB
ALBUMIN SERPL BCP-MCNC: 4 G/DL (ref 3.4–5)
ALP SERPL-CCNC: 73 U/L (ref 33–110)
ALT SERPL W P-5'-P-CCNC: 21 U/L (ref 7–45)
ANION GAP SERPL CALC-SCNC: 12 MMOL/L (ref 10–20)
AST SERPL W P-5'-P-CCNC: 18 U/L (ref 9–39)
BASOPHILS # BLD AUTO: 0.05 X10*3/UL (ref 0–0.1)
BASOPHILS NFR BLD AUTO: 0.5 %
BILIRUB SERPL-MCNC: 0.3 MG/DL (ref 0–1.2)
BUN SERPL-MCNC: 11 MG/DL (ref 6–23)
CALCIUM SERPL-MCNC: 9.2 MG/DL (ref 8.6–10.3)
CARDIAC TROPONIN I PNL SERPL HS: 4 NG/L (ref 0–13)
CARDIAC TROPONIN I PNL SERPL HS: 4 NG/L (ref 0–13)
CHLORIDE SERPL-SCNC: 103 MMOL/L (ref 98–107)
CO2 SERPL-SCNC: 26 MMOL/L (ref 21–32)
CREAT SERPL-MCNC: 0.67 MG/DL (ref 0.5–1.05)
EGFRCR SERPLBLD CKD-EPI 2021: >90 ML/MIN/1.73M*2
EOSINOPHIL # BLD AUTO: 0.25 X10*3/UL (ref 0–0.7)
EOSINOPHIL NFR BLD AUTO: 2.5 %
ERYTHROCYTE [DISTWIDTH] IN BLOOD BY AUTOMATED COUNT: 12.6 % (ref 11.5–14.5)
GLUCOSE SERPL-MCNC: 164 MG/DL (ref 74–99)
HCT VFR BLD AUTO: 39.1 % (ref 36–46)
HGB BLD-MCNC: 13.3 G/DL (ref 12–16)
IMM GRANULOCYTES # BLD AUTO: 0.02 X10*3/UL (ref 0–0.7)
IMM GRANULOCYTES NFR BLD AUTO: 0.2 % (ref 0–0.9)
LYMPHOCYTES # BLD AUTO: 3.64 X10*3/UL (ref 1.2–4.8)
LYMPHOCYTES NFR BLD AUTO: 37 %
MCH RBC QN AUTO: 29 PG (ref 26–34)
MCHC RBC AUTO-ENTMCNC: 34 G/DL (ref 32–36)
MCV RBC AUTO: 85 FL (ref 80–100)
MONOCYTES # BLD AUTO: 0.55 X10*3/UL (ref 0.1–1)
MONOCYTES NFR BLD AUTO: 5.6 %
NEUTROPHILS # BLD AUTO: 5.34 X10*3/UL (ref 1.2–7.7)
NEUTROPHILS NFR BLD AUTO: 54.2 %
NRBC BLD-RTO: 0 /100 WBCS (ref 0–0)
PLATELET # BLD AUTO: 333 X10*3/UL (ref 150–450)
POTASSIUM SERPL-SCNC: 3.7 MMOL/L (ref 3.5–5.3)
PROT SERPL-MCNC: 6.9 G/DL (ref 6.4–8.2)
RBC # BLD AUTO: 4.59 X10*6/UL (ref 4–5.2)
SODIUM SERPL-SCNC: 137 MMOL/L (ref 136–145)
TSH SERPL-ACNC: 3.05 MIU/L (ref 0.44–3.98)
WBC # BLD AUTO: 9.9 X10*3/UL (ref 4.4–11.3)

## 2024-08-31 PROCEDURE — 36415 COLL VENOUS BLD VENIPUNCTURE: CPT | Performed by: EMERGENCY MEDICINE

## 2024-08-31 PROCEDURE — 85025 COMPLETE CBC W/AUTO DIFF WBC: CPT | Performed by: EMERGENCY MEDICINE

## 2024-08-31 PROCEDURE — 80053 COMPREHEN METABOLIC PANEL: CPT | Performed by: EMERGENCY MEDICINE

## 2024-08-31 PROCEDURE — 84484 ASSAY OF TROPONIN QUANT: CPT | Performed by: EMERGENCY MEDICINE

## 2024-08-31 PROCEDURE — 84443 ASSAY THYROID STIM HORMONE: CPT | Performed by: SURGERY

## 2024-08-31 PROCEDURE — 71046 X-RAY EXAM CHEST 2 VIEWS: CPT

## 2024-08-31 PROCEDURE — 71046 X-RAY EXAM CHEST 2 VIEWS: CPT | Performed by: RADIOLOGY

## 2024-08-31 ASSESSMENT — PAIN SCALES - GENERAL: PAINLEVEL_OUTOF10: 0 - NO PAIN

## 2024-08-31 NOTE — DISCHARGE INSTRUCTIONS
Please continue your Lovenox as previously prescribed.  Please follow-up with your primary care physician next 1 to days repeat exam ensure improvement in symptoms or please return if worsening chest pain, palpitations or shortness of breath or any other concerning symptoms

## 2024-08-31 NOTE — ED PROVIDER NOTES
Chief Complaint   Patient presents with    Rapid Heart Rate     Pt states she feel it like her heart is racing     HPI:   Tsering Carranza is an 43 y.o. female with a history of HTN, DM 2, hypothyroidism, anxiety, depression, DVT presenting to the ED with chief complaint of anxiety.  She explains she was diagnosed with a DVT 2 days ago and has recently started taking Lovenox.  She explains that this morning when she took her medications she had a sensation that her heart was racing.  She denies any chest pain or shortness of breath at the time.  Patient explains as the day went on her symptoms progressively alleviated as the day went on however she took her second dose this evening and explains they started again.  She explains her heart rate at home was around 100.  She reports that it feels almost like anxiety.  She denies any abdominal pain nausea or vomiting.  No fever chills or sweats    Allergies   Allergen Reactions    Azithromycin Dizziness and Other    Bee Pollen Itching    Nut - Unspecified Itching    Pollen Extracts Itching    Prednisone Other    Strawberry Itching    Doxycycline Hives    Duloxetine Other and Unknown     change in mental heart racing elevated BP    Fluoxetine Hcl Anxiety and Unknown     Panic, high anxiety, fatigue    Grass Pollen Itching and Rash    House Dust Itching and Rash   :  Past Medical History:   Diagnosis Date    Allergic rhinitis 09/17/2016    Allergic rhinitis due to dust mite, animal dander, pollen    Calculus of kidney 06/16/2022    Kidney stone on left side    Depression     History of DVT (deep vein thrombosis)     2015 pregnancy    Hypothyroidism 01/31/2014    on Levo    Polycystic ovarian syndrome 06/15/2020    PCOS (polycystic ovarian syndrome)    Type 2 diabetes mellitus (Multi)     Type 2 diabetes mellitus without complications (Multi) 08/26/2022    Diet-controlled diabetes mellitus     Past Surgical History:   Procedure Laterality Date    OTHER SURGICAL HISTORY   05/16/2022    Tonsillectomy    OTHER SURGICAL HISTORY  09/14/2022    Cholecystectomy laparoscopic    OTHER SURGICAL HISTORY  11/20/2020    Lithotripsy     Family History   Problem Relation Name Age of Onset    Cholelithiasis Mother      Hyperlipidemia Mother      Other (PREDIABETES) Mother      Depression Father      Hypertension Father      Asthma Sister      Asthma Daughter      Other (CARDIAC DISORDER) Other GRANDFATHER         Physical Exam  Vitals and nursing note reviewed.   Constitutional:       General: She is not in acute distress.     Appearance: She is well-developed.   HENT:      Head: Normocephalic and atraumatic.      Right Ear: Tympanic membrane normal.      Left Ear: Tympanic membrane normal.      Nose: Nose normal.      Mouth/Throat:      Mouth: Mucous membranes are moist.      Pharynx: Oropharynx is clear.   Eyes:      Extraocular Movements: Extraocular movements intact.      Conjunctiva/sclera: Conjunctivae normal.      Pupils: Pupils are equal, round, and reactive to light.   Cardiovascular:      Rate and Rhythm: Normal rate and regular rhythm.      Heart sounds: No murmur heard.  Pulmonary:      Effort: Pulmonary effort is normal. No respiratory distress.      Breath sounds: Normal breath sounds.   Abdominal:      Palpations: Abdomen is soft.      Tenderness: There is no abdominal tenderness.   Musculoskeletal:         General: No swelling.      Cervical back: Neck supple.      Right lower leg: No edema.      Left lower leg: No edema.   Skin:     General: Skin is warm and dry.      Capillary Refill: Capillary refill takes less than 2 seconds.   Neurological:      General: No focal deficit present.      Mental Status: She is alert and oriented to person, place, and time.   Psychiatric:         Mood and Affect: Mood normal.     VS: As documented in the triage note and EMR flowsheet from this visit were reviewed.    Medical Decision Making: This is a 43-year-old female presenting to the ED with  chief complaint of anxiety after taking her Lovenox.  She explains she started Lovenox 2 days ago for DVT and explains that this morning she experienced rapid heart rate sensation after taking her medication.  She denies chest pain or shortness of breath during this time.  On exam her vitals are stable.  Her heart rate is at about 80 bpm.  She is well-appearing she is in no acute distress.  Heart rate and rhythm is regular.  Her lungs are clear to auscultation bilaterally.  She had no distal edema.  No JVD.  Good distal pulses equal bilaterally.  Hide low suspicion for acute pathology.  Basic labs are within normal limits.  No evidence of leukocytosis.  Kidney function was normal.  Electrolytes were normal.  Her troponins were negative.  Her TSH was within normal limits.  Her heart score was 0.  She is appropriate for outpatient management.  I have low suspicion for cardiopulmonary pathology.  She is appropriate for outpatient management and will follow-up with her primary care provider.      Counseling: Spoke with the patient and discussed today´s findings, in addition to providing specific details for the plan of care and expected course.  Patient was given the opportunity to ask questions.    Discussed return precautions and importance of follow-up.  Advised to follow-up with PCP.  I specifically advised to return to the ED for changing or worsening symptoms, new symptoms, complaint specific precautions, and precautions listed on the discharge paperwork.  Educated on the common potential side effects of medications prescribed.    I advised the patient that the emergency evaluation and treatment provided today doesn't end their need for medical care. It is very important that they follow-up with their primary care provider or other specialist as instructed.    The plan of care was mutually agreed upon with the patient. The patient and/or family were given the opportunity to ask questions. All questions asked today  in the ED were answered to the best of my ability with today's information.    This patient was cared for in the setting of nationwide stress on resources and staffing.    This report was transcribed using voice recognition software.  Every effort was made to ensure accuracy, however, inadvertently computerized transcription errors may be present.       Melvin Suárez PA-C  08/31/24 4743

## 2024-08-31 NOTE — ED TRIAGE NOTES
Pt dx with DVT recently, took Lovenox x2 days, feels like heart is racing because of Lovenox. DMII. No sob, no cp

## 2024-09-03 ENCOUNTER — HOSPITAL ENCOUNTER (EMERGENCY)
Facility: HOSPITAL | Age: 44
Discharge: HOME | End: 2024-09-03
Payer: COMMERCIAL

## 2024-09-03 ENCOUNTER — APPOINTMENT (OUTPATIENT)
Dept: RADIOLOGY | Facility: HOSPITAL | Age: 44
End: 2024-09-03
Payer: COMMERCIAL

## 2024-09-03 VITALS
HEART RATE: 84 BPM | SYSTOLIC BLOOD PRESSURE: 130 MMHG | TEMPERATURE: 98.6 F | HEIGHT: 63 IN | DIASTOLIC BLOOD PRESSURE: 85 MMHG | RESPIRATION RATE: 16 BRPM | OXYGEN SATURATION: 97 % | WEIGHT: 235 LBS | BODY MASS INDEX: 41.64 KG/M2

## 2024-09-03 DIAGNOSIS — S09.90XA CLOSED HEAD INJURY, INITIAL ENCOUNTER: Primary | ICD-10-CM

## 2024-09-03 LAB
ATRIAL RATE: 74 BPM
P AXIS: 23 DEGREES
P OFFSET: 175 MS
P ONSET: 127 MS
PR INTERVAL: 188 MS
Q ONSET: 221 MS
QRS COUNT: 12 BEATS
QRS DURATION: 84 MS
QT INTERVAL: 372 MS
QTC CALCULATION(BAZETT): 412 MS
QTC FREDERICIA: 399 MS
R AXIS: 43 DEGREES
T AXIS: -8 DEGREES
T OFFSET: 407 MS
VENTRICULAR RATE: 74 BPM

## 2024-09-03 PROCEDURE — 70450 CT HEAD/BRAIN W/O DYE: CPT

## 2024-09-03 PROCEDURE — 70450 CT HEAD/BRAIN W/O DYE: CPT | Performed by: RADIOLOGY

## 2024-09-03 PROCEDURE — 99284 EMERGENCY DEPT VISIT MOD MDM: CPT | Mod: 25

## 2024-09-03 ASSESSMENT — PAIN DESCRIPTION - LOCATION
LOCATION: HEAD
LOCATION: HEAD

## 2024-09-03 ASSESSMENT — PAIN SCALES - GENERAL
PAINLEVEL_OUTOF10: 3
PAINLEVEL_OUTOF10: 4

## 2024-09-03 ASSESSMENT — PAIN DESCRIPTION - ORIENTATION: ORIENTATION: OUTER

## 2024-09-03 ASSESSMENT — PAIN DESCRIPTION - PAIN TYPE: TYPE: ACUTE PAIN

## 2024-09-03 ASSESSMENT — PAIN - FUNCTIONAL ASSESSMENT
PAIN_FUNCTIONAL_ASSESSMENT: 0-10
PAIN_FUNCTIONAL_ASSESSMENT: 0-10

## 2024-09-03 ASSESSMENT — PAIN DESCRIPTION - DESCRIPTORS: DESCRIPTORS: DULL;ACHING
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
## 2024-09-05 ENCOUNTER — APPOINTMENT (OUTPATIENT)
Dept: PRIMARY CARE | Facility: CLINIC | Age: 44
End: 2024-09-05
Payer: COMMERCIAL

## 2024-09-05 ENCOUNTER — OFFICE VISIT (OUTPATIENT)
Dept: PRIMARY CARE | Facility: CLINIC | Age: 44
End: 2024-09-05
Payer: COMMERCIAL

## 2024-09-05 VITALS
BODY MASS INDEX: 41.99 KG/M2 | HEART RATE: 92 BPM | SYSTOLIC BLOOD PRESSURE: 152 MMHG | DIASTOLIC BLOOD PRESSURE: 100 MMHG | WEIGHT: 237 LBS | HEIGHT: 63 IN

## 2024-09-05 DIAGNOSIS — S09.90XD INJURY OF HEAD, SUBSEQUENT ENCOUNTER: Primary | ICD-10-CM

## 2024-09-05 DIAGNOSIS — R79.89 LOW VITAMIN D LEVEL: ICD-10-CM

## 2024-09-05 PROCEDURE — 3077F SYST BP >= 140 MM HG: CPT | Performed by: INTERNAL MEDICINE

## 2024-09-05 PROCEDURE — 3080F DIAST BP >= 90 MM HG: CPT | Performed by: INTERNAL MEDICINE

## 2024-09-05 PROCEDURE — 3008F BODY MASS INDEX DOCD: CPT | Performed by: INTERNAL MEDICINE

## 2024-09-05 PROCEDURE — 99213 OFFICE O/P EST LOW 20 MIN: CPT | Performed by: INTERNAL MEDICINE

## 2024-09-05 RX ORDER — CHOLECALCIFEROL (VITAMIN D3) 50 MCG
50 TABLET ORAL DAILY
Qty: 90 TABLET | Refills: 1 | Status: SHIPPED | OUTPATIENT
Start: 2024-09-05

## 2024-09-05 ASSESSMENT — PATIENT HEALTH QUESTIONNAIRE - PHQ9
SUM OF ALL RESPONSES TO PHQ9 QUESTIONS 1 AND 2: 2
10. IF YOU CHECKED OFF ANY PROBLEMS, HOW DIFFICULT HAVE THESE PROBLEMS MADE IT FOR YOU TO DO YOUR WORK, TAKE CARE OF THINGS AT HOME, OR GET ALONG WITH OTHER PEOPLE: NOT DIFFICULT AT ALL
1. LITTLE INTEREST OR PLEASURE IN DOING THINGS: SEVERAL DAYS
2. FEELING DOWN, DEPRESSED OR HOPELESS: SEVERAL DAYS

## 2024-09-05 ASSESSMENT — LIFESTYLE VARIABLES
HOW OFTEN DO YOU HAVE SIX OR MORE DRINKS ON ONE OCCASION: NEVER
HOW OFTEN DO YOU HAVE A DRINK CONTAINING ALCOHOL: NEVER
AUDIT-C TOTAL SCORE: 0
HOW MANY STANDARD DRINKS CONTAINING ALCOHOL DO YOU HAVE ON A TYPICAL DAY: PATIENT DOES NOT DRINK
SKIP TO QUESTIONS 9-10: 1
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
## 2024-09-06 ENCOUNTER — TELEPHONE (OUTPATIENT)
Dept: PRIMARY CARE | Facility: CLINIC | Age: 44
End: 2024-09-06
Payer: COMMERCIAL

## 2024-09-10 ENCOUNTER — TELEPHONE (OUTPATIENT)
Dept: PRIMARY CARE | Facility: CLINIC | Age: 44
End: 2024-09-10
Payer: COMMERCIAL

## 2024-09-12 ENCOUNTER — TELEMEDICINE (OUTPATIENT)
Dept: PRIMARY CARE | Facility: CLINIC | Age: 44
End: 2024-09-12
Payer: COMMERCIAL

## 2024-09-12 DIAGNOSIS — I82.4Z2 ACUTE DEEP VEIN THROMBOSIS (DVT) OF DISTAL VEIN OF LEFT LOWER EXTREMITY (MULTI): ICD-10-CM

## 2024-09-12 PROCEDURE — 99214 OFFICE O/P EST MOD 30 MIN: CPT | Performed by: INTERNAL MEDICINE

## 2024-09-12 RX ORDER — ENOXAPARIN SODIUM 100 MG/ML
100 INJECTION SUBCUTANEOUS EVERY 12 HOURS
Qty: 60 ML | Refills: 0 | Status: SHIPPED | OUTPATIENT
Start: 2024-09-12

## 2024-09-12 NOTE — ASSESSMENT & PLAN NOTE
Bruising at the site of injection - has not happened in the past  Will check blood counts and pt/inr  Will send to different pharmacy - possibly from the   Will ice 20 min on/off

## 2024-09-12 NOTE — PROGRESS NOTES
Subjective   Patient ID: Tsering Carranza is a 44 y.o. female who presents via virtual visit for Follow-up (Bruising from lovenox).  An interactive audio and video telecommunication system which permits real time communications between the patient (at the originating site) and provider (at the distant site) was utilized to provide this telehealth service.    Verbal consent was requested and obtained from the patient on the day of encounter.  HPI  Pt states that she is having pretty bad bruising around the lovenox injection sites    Review of Systems   All other systems reviewed and are negative.      Assessment/Plan     Problem List Items Addressed This Visit       Acute deep vein thrombosis (DVT) of distal vein of left lower extremity (Multi)     Bruising at the site of injection - has not happened in the past  Will check blood counts and pt/inr  Will send to different pharmacy - possibly from the   Will ice 20 min on/off         Relevant Medications    enoxaparin (Lovenox) 100 mg/mL syringe    Other Relevant Orders    CBC and Auto Differential (Completed)    Protime-INR (Completed)    Iron and TIBC (Completed)    Ferritin (Completed)

## 2024-09-13 ENCOUNTER — LAB (OUTPATIENT)
Dept: LAB | Facility: LAB | Age: 44
End: 2024-09-13
Payer: COMMERCIAL

## 2024-09-13 DIAGNOSIS — E03.9 HYPOTHYROIDISM, UNSPECIFIED TYPE: ICD-10-CM

## 2024-09-13 DIAGNOSIS — I82.4Z2 ACUTE DEEP VEIN THROMBOSIS (DVT) OF DISTAL VEIN OF LEFT LOWER EXTREMITY (MULTI): ICD-10-CM

## 2024-09-13 LAB
BASOPHILS # BLD AUTO: 0.05 X10*3/UL (ref 0–0.1)
BASOPHILS NFR BLD AUTO: 0.6 %
EOSINOPHIL # BLD AUTO: 0.13 X10*3/UL (ref 0–0.7)
EOSINOPHIL NFR BLD AUTO: 1.6 %
ERYTHROCYTE [DISTWIDTH] IN BLOOD BY AUTOMATED COUNT: 13.1 % (ref 11.5–14.5)
FERRITIN SERPL-MCNC: 74 NG/ML (ref 8–150)
HCT VFR BLD AUTO: 41.2 % (ref 36–46)
HGB BLD-MCNC: 13.3 G/DL (ref 12–16)
IMM GRANULOCYTES # BLD AUTO: 0.06 X10*3/UL (ref 0–0.7)
IMM GRANULOCYTES NFR BLD AUTO: 0.7 % (ref 0–0.9)
INR PPP: 1.1 (ref 0.9–1.1)
IRON SATN MFR SERPL: 47 % (ref 25–45)
IRON SERPL-MCNC: 154 UG/DL (ref 35–150)
LYMPHOCYTES # BLD AUTO: 2.47 X10*3/UL (ref 1.2–4.8)
LYMPHOCYTES NFR BLD AUTO: 29.5 %
MCH RBC QN AUTO: 28.9 PG (ref 26–34)
MCHC RBC AUTO-ENTMCNC: 32.3 G/DL (ref 32–36)
MCV RBC AUTO: 89 FL (ref 80–100)
MONOCYTES # BLD AUTO: 0.41 X10*3/UL (ref 0.1–1)
MONOCYTES NFR BLD AUTO: 4.9 %
NEUTROPHILS # BLD AUTO: 5.26 X10*3/UL (ref 1.2–7.7)
NEUTROPHILS NFR BLD AUTO: 62.7 %
NRBC BLD-RTO: 0 /100 WBCS (ref 0–0)
PLATELET # BLD AUTO: 310 X10*3/UL (ref 150–450)
PROTHROMBIN TIME: 12.6 SECONDS (ref 9.8–12.8)
RBC # BLD AUTO: 4.61 X10*6/UL (ref 4–5.2)
TIBC SERPL-MCNC: 329 UG/DL (ref 240–445)
TSH SERPL-ACNC: 2.96 MIU/L (ref 0.44–3.98)
UIBC SERPL-MCNC: 175 UG/DL (ref 110–370)
WBC # BLD AUTO: 8.4 X10*3/UL (ref 4.4–11.3)

## 2024-09-13 PROCEDURE — 82728 ASSAY OF FERRITIN: CPT

## 2024-09-13 PROCEDURE — 85610 PROTHROMBIN TIME: CPT

## 2024-09-13 PROCEDURE — 36415 COLL VENOUS BLD VENIPUNCTURE: CPT

## 2024-09-13 PROCEDURE — 83550 IRON BINDING TEST: CPT

## 2024-09-13 PROCEDURE — 85025 COMPLETE CBC W/AUTO DIFF WBC: CPT

## 2024-09-13 PROCEDURE — 84443 ASSAY THYROID STIM HORMONE: CPT

## 2024-09-13 PROCEDURE — 83540 ASSAY OF IRON: CPT

## 2024-09-23 ENCOUNTER — HOSPITAL ENCOUNTER (OUTPATIENT)
Dept: RADIOLOGY | Facility: HOSPITAL | Age: 44
Discharge: HOME | End: 2024-09-23
Payer: COMMERCIAL

## 2024-09-23 DIAGNOSIS — N20.0 CALCULUS OF KIDNEY: ICD-10-CM

## 2024-09-23 PROCEDURE — 74018 RADEX ABDOMEN 1 VIEW: CPT

## 2024-09-25 ENCOUNTER — TELEPHONE (OUTPATIENT)
Dept: PRIMARY CARE | Facility: CLINIC | Age: 44
End: 2024-09-25

## 2024-09-25 ENCOUNTER — TELEMEDICINE CLINICAL SUPPORT (OUTPATIENT)
Dept: PRIMARY CARE | Facility: CLINIC | Age: 44
End: 2024-09-25
Payer: COMMERCIAL

## 2024-09-25 DIAGNOSIS — E11.9 TYPE 2 DIABETES MELLITUS WITHOUT COMPLICATION, WITHOUT LONG-TERM CURRENT USE OF INSULIN (MULTI): ICD-10-CM

## 2024-09-25 NOTE — PROGRESS NOTES
Nutrition: Initial Assessment    Tsering Carranza is a 44 y.o. female being seen virtually who was referred by Dr. Mayela Torres on 8/8/24 for T2DM.       Nutrition Assessment    Food & Nutrition Related History: Pt presents virtually to discuss T2DM and nutrition. She notes frequent eating out, sometimes twice daily. Reports binging behaviors but no purging.       Allergies: Treenuts  Intolerance: Lactose  GI Symptoms : diarrhea, GI upset; Has IBS-D  Food Preparation: Patient  Cooking Skills/Barriers: None reported  Grocery Shopping: Patient      BEDS-7  During the last 3 months, did you have any episodes of excessive overeating (eating significantly more than what most people would eat in a similar period of time)? Yes  Do you feel distressed about your episodes of excessive overeating? Yes  Within the last 3 months...  During your episodes of excessive overeating, how often did you feel like you had no control over your eating (e.g., not being able to stop eating, feel compelled to eat, or going back and forth for more food)? Always  During your episodes of excessive overeating, how often did you continue eating even though you were not hungry? Always  During your episodes of excessive overeating, how often were you embarrassed by how much you ate? Always  During your episodes of excessive overeating, how often did you feel disgusted with yourself or guilty afterward? Always  During the last 3 months, how often did you make yourself vomit as a means to control your weight or shape? Never or Rarely    This tool is for screening purposes only. It is not used as a diagnostic tool       Labs:  CMP trend:    Recent Labs     08/31/24  0031 08/12/24  0723 01/18/24  1436 12/06/23  1306   GLUCOSE 164* 150*  --  126*    139  --  139   K 3.7 4.3  --  4.1    103  --  105   CO2 26 28  --  26   ANIONGAP 12 12  --  12   BUN 11 13 12 11   CREATININE 0.67 0.70 0.73 0.73   EGFR >90 >90 >90 >90   CALCIUM 9.2 9.0  --  " 8.6   ALBUMIN 4.0 4.1  --  4.0   ALKPHOS 73 70  --  63   PROT 6.9 6.7  --  7.1   AST 18 15  --  21   BILITOT 0.3 0.4  --  0.3   ALT 21 20  --  20     Lipid Panel trend:    Recent Labs     08/12/24  0723 07/03/23  0759 08/26/22  0754 01/31/22  0728   CHOL 172 200* 173 219*   HDL 48.4 57.7 51.9 45.0   LDLCALC 93  --   --   --    LDLF  --  125* 92 128*   VLDL 31 18 29 46*   TRIG 154* 89 144 230*     Vit D:   Lab Results   Component Value Date    VITD25 30 08/12/2024     DM specific labs:   Recent Labs     08/12/24 0723 07/03/23  0759 08/26/22  0754   HGBA1C 6.7* 5.4 5.2       Diabetes:  Diagnosed 2 years ago   Prior Nutrition Education: No   SMBG: Meter, once weekly   Hypoglycemia: Denies   Current DM Medications/Insulin Regimen: None     Nutrition Focused Physical Exam:  Performed/Deferred: Deferred due to be being virtual visit      Past Medical History:  Patient Active Problem List   Diagnosis    Allergic rhinitis    Anxiety and depression    Chronic deep vein thrombosis (DVT) of popliteal vein of left lower extremity (Multi)    Follicular cyst of left ovary    History of DVT (deep vein thrombosis)    Hypersomnolence    Hypertension    Hypothyroidism    IBS (irritable bowel syndrome)    Iron deficiency anemia    Low vitamin D level    Nightmares associated with chronic post-traumatic stress disorder    Vestibular migraine    Vitamin D insufficiency    Obesity (BMI 30.0-34.9)    Mixed hyperlipidemia    Post-concussion syndrome    Concussion with no loss of consciousness, subsequent encounter    Dizziness    Cervicalgia    Acute deep vein thrombosis (DVT) of distal vein of left lower extremity (Multi)        Anthropometrics:  Ht Readings from Last 1 Encounters:   09/27/24 1.6 m (5' 3\")     BMI Readings from Last 1 Encounters:   09/27/24 41.98 kg/m²     Wt Readings from Last 10 Encounters:   09/05/24 108 kg (237 lb)   09/03/24 107 kg (235 lb)   08/30/24 108 kg (237 lb)   08/12/24 108 kg (237 lb)   08/12/24 110 kg " (242 lb)   08/08/24 109 kg (240 lb)   04/26/24 102 kg (225 lb)   04/11/24 106 kg (232 lb 9.6 oz)   03/29/24 101 kg (222 lb)   03/26/24 101 kg (222 lb)       Weight change: Wt gain of 5% (12 lbs) x 4 months   Significant weight change: No    Estimated Nutrition Needs:    Total Energy Estimated Needs (kCal): 1700 kCal   Method for Estimating Needs: MSJ 1704 x 1.3 - 500 (for weight loss)   Total Protein Estimated Needs (g): 86 g   Total Protein Estimated Needs (g/kg): 0.8 g/kg    Nutrition Diagnosis       Patient has Nutrition Diagnosis: Yes Diagnosis Status (1): New  Nutrition Diagnosis 1: Altered nutrition related to laboratory values Related to (1): T2DM As Evidenced by (1): A1c = 6.7% (8/12/24)     Diagnosis Status (2): New Nutrition Diagnosis 2: Disordered eating pattern  Nutrition Diagnosis 2: Disordered eating pattern Related to (2): psychological/physiological factors As Evidenced by (2): BED-7 screening, patient interview       Nutrition Interventions/Recommendations   FOOD & NUTRIENT DELIVERY: Consistent Carbohydrate Diet    COORDINATION OF CARE:   Notified PCP of + screening on BED-7 and PCP placed psych referral and recommended patient schedule with [facility]  Discussed referral to ALTA Brown, RD, LD     NUTRITION EDUCATION:   Provided education on what happens in the body when prediabetes and diabetes develop. Explained why providing consistent amounts of carbohydrates in the diet is helpful for regulating blood sugar. Encouraged choosing foods that contain minimally processed complex carbohydrates, such as high fiber whole grains, beans, starchy vegetables, whole fruits. Simple sugars from fruit juice, sugar-sweetened beverages, and foods with added sugar should be limited in the diet. Also discussed how foods like protein, some fat, and non-starchy vegetables impact blood sugar regulation.  Provided education on Plate Method as a tool for preparing balanced meals. Discussed the following:  Fill 1/3 plate with non-starchy vegetables (broccoli, carrots, cauliflower, salad greens, cucumbers, tomatoes). These foods contribute very few carbohydrates, and they add fiber to the meal. Fill 1/3 plate with lean protein (meat, turkey, fish, seafood, eggs, nuts, cheese, cottage cheese, nut butter, tofu, edamame). Fill 1/3 plate with carbohydrates/starches (grains, fruits, starchy vegetables, beans, yogurt, milk). Aim for at least half of daily grains as whole grains.   Discussed using diabetesfoodhub.org for recipe ideas.  Discussed trial of meal delivery services such as [employer].      Educational Handouts: ADA Plate Method    *Patient expressed understanding of the education provided and denied any additional questions/concerns.       Nutrition Monitoring and Evaluation   Nutrition Goals : Consistent meal/snack pattern  Increase awareness and respond to hunger cues  Increase awareness and respond to satiety cues    Readiness to Change : Good  Level of Understanding : Good  Anticipated Compliant : Good     Follow-up: Patient is welcome to follow-up at any time. To schedule, please call [phone].

## 2024-09-27 VITALS — HEIGHT: 63 IN | BODY MASS INDEX: 41.98 KG/M2

## 2024-09-30 ENCOUNTER — APPOINTMENT (OUTPATIENT)
Dept: NUTRITION | Facility: HOSPITAL | Age: 44
End: 2024-09-30
Payer: COMMERCIAL

## 2024-10-03 PROBLEM — K52.9 CHRONIC DIARRHEA: Status: ACTIVE | Noted: 2024-10-03

## 2024-10-03 PROBLEM — M25.511 PAIN OF RIGHT SHOULDER REGION: Status: ACTIVE | Noted: 2024-10-03

## 2024-10-03 PROBLEM — R03.0 ELEVATED BLOOD PRESSURE READING WITHOUT DIAGNOSIS OF HYPERTENSION: Status: RESOLVED | Noted: 2024-10-03 | Resolved: 2024-10-03

## 2024-10-03 PROBLEM — R73.03 PREDIABETES: Status: ACTIVE | Noted: 2024-10-03

## 2024-10-03 PROBLEM — M76.819 ANTERIOR TIBIALIS TENDINITIS: Status: ACTIVE | Noted: 2024-10-03

## 2024-10-03 PROBLEM — R06.83 SNORING: Status: ACTIVE | Noted: 2024-10-03

## 2024-10-03 PROBLEM — R11.2 NAUSEA AND VOMITING: Status: ACTIVE | Noted: 2024-10-03

## 2024-10-03 PROBLEM — G43.909 MIGRAINE HEADACHE: Status: ACTIVE | Noted: 2024-10-03

## 2024-10-03 PROBLEM — D36.13 NEUROMA OF FOOT: Status: ACTIVE | Noted: 2024-10-03

## 2024-10-03 PROBLEM — R63.2 BINGE EATING: Status: ACTIVE | Noted: 2024-10-03

## 2024-10-03 PROBLEM — E66.01 SEVERE OBESITY (MULTI): Status: ACTIVE | Noted: 2024-10-03

## 2024-10-03 PROBLEM — E28.2 POLYCYSTIC OVARY SYNDROME: Status: ACTIVE | Noted: 2024-10-03

## 2024-10-03 PROBLEM — E88.819 INSULIN RESISTANCE: Status: ACTIVE | Noted: 2024-10-03

## 2024-10-03 PROBLEM — G47.9 DISTURBANCE IN SLEEP BEHAVIOR: Status: ACTIVE | Noted: 2024-10-03

## 2024-10-03 PROBLEM — M54.10 RADICULAR PAIN: Status: ACTIVE | Noted: 2024-10-03

## 2024-10-03 PROBLEM — R03.0 ELEVATED BLOOD PRESSURE READING WITHOUT DIAGNOSIS OF HYPERTENSION: Status: ACTIVE | Noted: 2024-10-03

## 2024-10-03 PROBLEM — G25.81 RESTLESS LEGS SYNDROME: Status: ACTIVE | Noted: 2024-10-03

## 2024-10-14 ENCOUNTER — APPOINTMENT (OUTPATIENT)
Dept: OBSTETRICS AND GYNECOLOGY | Facility: CLINIC | Age: 44
End: 2024-10-14
Payer: COMMERCIAL

## 2024-10-15 ENCOUNTER — APPOINTMENT (OUTPATIENT)
Dept: UROLOGY | Facility: CLINIC | Age: 44
End: 2024-10-15
Payer: COMMERCIAL

## 2024-10-16 ENCOUNTER — TELEMEDICINE (OUTPATIENT)
Dept: UROLOGY | Facility: CLINIC | Age: 44
End: 2024-10-16
Payer: MEDICARE

## 2024-10-16 ENCOUNTER — APPOINTMENT (OUTPATIENT)
Dept: UROLOGY | Facility: CLINIC | Age: 44
End: 2024-10-16
Payer: MEDICARE

## 2024-10-16 DIAGNOSIS — N20.0 STONE IN KIDNEY: Primary | ICD-10-CM

## 2024-10-16 PROCEDURE — 99214 OFFICE O/P EST MOD 30 MIN: CPT | Performed by: PHYSICIAN ASSISTANT

## 2024-10-16 ASSESSMENT — ENCOUNTER SYMPTOMS
GASTROINTESTINAL NEGATIVE: 1
PSYCHIATRIC NEGATIVE: 1
EYES NEGATIVE: 1
NEUROLOGICAL NEGATIVE: 1
ALLERGIC/IMMUNOLOGIC NEGATIVE: 1
MUSCULOSKELETAL NEGATIVE: 1
ENDOCRINE NEGATIVE: 1
HEMATOLOGIC/LYMPHATIC NEGATIVE: 1
CARDIOVASCULAR NEGATIVE: 1
CONSTITUTIONAL NEGATIVE: 1
RESPIRATORY NEGATIVE: 1

## 2024-10-16 NOTE — PROGRESS NOTES
Subjective   Patient ID: Tsering Carranza is a 44 y.o. female who presents for No chief complaint on file..  HPI  Patient is a 43 yo female with recurrent renal stones previously managed with lithotripsy presents virtually for follow up for concern of increasing size of kidney stone.     Patient reports history of kidney stones since 2005. She reports having multiple lithotripsies and  passing stones on her own.   She had a recent ultrasound showing a 9mm stones in right kidney. Last year she had a CT AP showing bilateral renal stones the largest in the left kidney measuring 3 mm.    She denies flank pain, nausea, vomiting, increased frequency or urgency. She denies recurrent UTIs.     US renal complete  Status: Final result     PACS Images     Show images for US renal complete  Signed by    Signed Time Phone Pager   Patrick Alicea MD 8/12/2024 12:11 [phone]      Exam Information    Status Exam Begun Exam Ended   Final 8/12/2024 08:28 8/12/2024 09:00     Study Result    Narrative & Impression   Interpreted By:  Patrick Alicea,   STUDY:  US RENAL COMPLETE;  8/12/2024 9:00 am      INDICATION:  Signs/Symptoms:stone.      COMPARISON:  None.      ACCESSION NUMBER(S):  [id number]      ORDERING CLINICIAN:  GUADALUPE REAL      TECHNIQUE:  Multiple images of the kidneys were obtained  .      FINDINGS:  RIGHT KIDNEY:  The right kidney measures 11.2 cm in length. The renal cortical  echogenicity and thickness are within normal limits. No  hydronephrosis is present. Inferior pole calculus measuring 0.9 cm.      LEFT KIDNEY:  The left kidney measures 11.5 cm in length. The renal cortical  echogenicity and thickness are within normal limits. No  hydronephrosis is present; no evidence of nephrolithiasis.      BLADDER:  Under filled with prevoid volume 150 mL. Postvoiding volume 11 mL.      IMPRESSION:  1. Nonobstructive right inferior pole nephrolithiasis measuring 0.9  cm.  2. Incidental note made of increased hepatic  echotexture could be  related to underlying steatosis. Advise correlation with liver  function tests.      MACRO:  None          Signed by: Patrick Alicea 8/12/2024 12:11 PM  Dictation workstation:   [id number]       CT abdomen pelvis w IV contrast  Status: Final result     PACS Images     Show images for CT abdomen pelvis w IV contrast  Signed by    Signed Time Phone Pager   Ramez Hoyt MD 12/06/2023 18:41 [phone]      Exam Information    Status Exam Begun Exam Ended   Final 12/06/2023 16:53 12/06/2023 17:20     Study Result    Narrative & Impression   STUDY:  CT Abdomen and Pelvis with IV Contrast; 12/6/2023 5:20 PM  INDICATION:  Pelvic pain.  Has had multiple pelvic ultrasounds with a fibroid.   Concern for kidney stones or bladder pathology.  COMPARISON:  US pelvis 11/10/2023, US pelvis 8/25/2023.  ACCESSION NUMBER(S):  [id number]  ORDERING CLINICIAN:  BALJINDER ARRIAGA  TECHNIQUE:  CT of the abdomen and pelvis was performed.  Contiguous axial images  were obtained at 3 mm slice thickness through the abdomen and pelvis.   Coronal and sagittal reconstructions at 3 mm slice thickness were  performed.  Omnipaque 350 75 mL was administered intravenously.    FINDINGS:  LOWER CHEST:  No cardiomegaly.  No pericardial effusion.  Lung bases are clear.     ABDOMEN:     LIVER:  No hepatomegaly.  Smooth surface contour.  Normal attenuation.     BILE DUCTS:  No intrahepatic or extrahepatic biliary ductal dilatation.     GALLBLADDER:  The gallbladder is not seen.  STOMACH:  No abnormalities identified.     PANCREAS:  No masses or ductal dilatation.     SPLEEN:  No splenomegaly or focal splenic lesion.     ADRENAL GLANDS:  No thickening or nodules.     KIDNEYS AND URETERS:  Kidneys are normal in size and location.  Small stone seen in the  lower pole of the left kidney measuring up to 3 mm in size. No  hydronephrosis. Small left renal hypodensity is likely a cyst.     PELVIS:     BLADDER:  No abnormalities identified.      REPRODUCTIVE ORGANS:  Retroverted/retroflexed uterus is seen.  BOWEL:  No abnormalities identified. Appendix unremarkable.     VESSELS:  No abnormalities identified.  Abdominal aorta is normal in caliber.      PERITONEUM/RETROPERITONEUM/LYMPH NODES:  No free fluid.  No pneumoperitoneum.  No lymphadenopathy.     ABDOMINAL WALL:  No abnormalities identified.  SOFT TISSUES:   No abnormalities identified.     BONES:  No acute fracture or aggressive osseous lesion.  IMPRESSION:  Left nephrolithiasis, without evidence for acute obstruction. Urinary  bladder within normal limits.  Signed by Ramez Hoyt MD     Review of Systems   Constitutional: Negative.    HENT: Negative.     Eyes: Negative.    Respiratory: Negative.     Cardiovascular: Negative.    Gastrointestinal: Negative.    Endocrine: Negative.    Genitourinary: Negative.    Musculoskeletal: Negative.    Skin: Negative.    Allergic/Immunologic: Negative.    Neurological: Negative.    Hematological: Negative.    Psychiatric/Behavioral: Negative.         Objective   Physical Exam  Constitutional:       General: She is not in acute distress.     Appearance: Normal appearance.   HENT:      Head: Normocephalic and atraumatic.      Nose: Nose normal.   Pulmonary:      Effort: Pulmonary effort is normal.   Musculoskeletal:         General: Normal range of motion.      Cervical back: Normal range of motion.   Skin:     General: Skin is warm and dry.   Neurological:      General: No focal deficit present.      Mental Status: She is alert and oriented to person, place, and time.   Psychiatric:         Mood and Affect: Mood normal.         Assessment/Plan     Renal stones  Obtain UA with culture placed at the lab  Obtain CT stone protocol to evaluate true stone size  I discussed increasing fluid intake to at least 2.5 L a day.   Reduce oxalate in diet    Follow up in 3-4 weeks to review CT results            Abdirahman Stone PA-C 10/16/24 9:52 AM

## 2024-10-24 ENCOUNTER — APPOINTMENT (OUTPATIENT)
Dept: CARDIOLOGY | Facility: CLINIC | Age: 44
End: 2024-10-24
Payer: COMMERCIAL

## 2024-10-24 VITALS
WEIGHT: 244 LBS | HEIGHT: 63 IN | OXYGEN SATURATION: 97 % | SYSTOLIC BLOOD PRESSURE: 128 MMHG | BODY MASS INDEX: 43.23 KG/M2 | HEART RATE: 94 BPM | DIASTOLIC BLOOD PRESSURE: 80 MMHG

## 2024-10-24 DIAGNOSIS — I82.4Z2 ACUTE DEEP VEIN THROMBOSIS (DVT) OF DISTAL VEIN OF LEFT LOWER EXTREMITY (MULTI): ICD-10-CM

## 2024-10-24 DIAGNOSIS — I82.402 DEEP VEIN THROMBOSIS (DVT) OF LEFT LOWER EXTREMITY, UNSPECIFIED CHRONICITY, UNSPECIFIED VEIN (MULTI): Primary | ICD-10-CM

## 2024-10-24 RX ORDER — ENOXAPARIN SODIUM 100 MG/ML
100 INJECTION SUBCUTANEOUS EVERY 12 HOURS
Qty: 180 EACH | Refills: 1 | Status: SHIPPED | OUTPATIENT
Start: 2024-10-24 | End: 2025-04-22

## 2024-10-24 NOTE — LETTER
October 26, 2024     Mayela Torres DO  [address]  [facility] [address]    Patient: Tsering Carranza   YOB: 1980   Date of Visit: 10/24/2024       Dear Dr. Mayela Torres DO:    Thank you for referring Tsering Carranza to me for evaluation. Below are my notes for this consultation.  If you have questions, please do not hesitate to call me. I look forward to following your patient along with you.       Sincerely,     Thang Vinson MD      CC: No Recipients  ______________________________________________________________________________________    PCP: Mayela Torres DO    Subjective  Tsering Carranza is a 44 y.o. female with history of DVT on Lovenox, hypothyroidism, diabetes who is here for follow up of  recurrent DVT  --  reports worsening symptoms after she bumped her leg.     Patient is following up for a blood clot for the third time. Patient wants to know if there will be a long term AC indicated.    DVT hx:  1st - pregnancy 2015 - lovenox x duration of pregnancy (LLE)  2nd - surgery 2022 - lovenox x 3 months (RLE)     Had prior sxs with Eliquis (?dizziness).  No other family hx VTE, but her mother was adopted so do not know other family hx. No miscarriages per pt report.    Factor V, protein C & S, prothrombin gene, APLS - negative based on Epic search.    Review of Systems:  Otherwise, limited cardiovascular review of systems is negative.    Past Medical History:  She has a past medical history of Allergic rhinitis (09/17/2016), Calculus of kidney (06/16/2022), Depression, History of DVT (deep vein thrombosis), Hypothyroidism (01/31/2014), Polycystic ovarian syndrome (06/15/2020), Type 2 diabetes mellitus (Multi), and Type 2 diabetes mellitus without complications (Multi) (08/26/2022).    Surgical History:   She has a past surgical history that includes Other surgical history (05/16/2022); Other surgical history (09/14/2022); and Other surgical history (11/20/2020).    Family  History:   Family History   Problem Relation Name Age of Onset   • Cholelithiasis Mother     • Hyperlipidemia Mother     • Other (PREDIABETES) Mother     • Depression Father     • Hypertension Father     • Asthma Sister     • Asthma Daughter     • Other (CARDIAC DISORDER) Other GRANDFATHER      Family History   Problem Relation Name Age of Onset   • Cholelithiasis Mother     • Hyperlipidemia Mother     • Other (PREDIABETES) Mother     • Depression Father     • Hypertension Father     • Asthma Sister     • Asthma Daughter     • Other (CARDIAC DISORDER) Other GRANDFATHER        Social History:   Tobacco Use: Low Risk  (10/4/2024)    Received [facility]    Patient History    • Smoking Tobacco Use: Never    • Smokeless Tobacco Use: Never    • Passive Exposure: Never       Outpatient Medications:    Current Outpatient Medications:   •  alcohol swabs pads, medicated, To test once daily, Disp: 100 each, Rfl: 1  •  blood sugar diagnostic (Blood Glucose Test) strip, To test once daily, Disp: 100 strip, Rfl: 1  •  cholecalciferol (Vitamin D-3) 50 MCG (2000 UT) tablet, Take 1 tablet (50 mcg) by mouth once daily., Disp: 90 tablet, Rfl: 1  •  efinaconazole (Jublia) 10 % solution with applicator, Apply to affected nails nightly., Disp: 8 mL, Rfl: 3  •  enoxaparin (Lovenox) 100 mg/mL syringe, Inject 1 mL (100 mg) under the skin every 12 hours., Disp: 60 mL, Rfl: 0  •  EPINEPHrine (EpiPen 2-Wilfrido) 0.3 mg/0.3 mL injection syringe, Inject into lateral thigh for anaphylaxis. Call 911 after use. May repeat after 5 minutes if not effective., Disp: 2 each, Rfl: 1  •  EPINEPHrine 0.3 mg/0.3 mL injection syringe, Inject 0.3 mL (0.3 mg) as directed 1 time., Disp: , Rfl:   •  ferrous fumarate-vitamin C (Oswaldo-Sequeles 65-25), Take 1 tablet by mouth 3 times a day with meals. Do not crush, chew, or split., Disp: , Rfl:   •  ketoconazole (NIZOral) 2 % cream, Apply to soles and in between the toes once daily for maintenance.,  "Disp: 60 g, Rfl: 3  •  lancets misc, To test once daily, Disp: 100 each, Rfl: 1  •  levothyroxine (Synthroid, Levoxyl) 125 mcg tablet, Take 1 tablet (125 mcg) by mouth once daily., Disp: 30 tablet, Rfl: 1  •  olopatadine (Pataday) 0.2 % ophthalmic solution, Administer 1 drop into affected eye(s) once daily as needed for allergies (itchy eyes)., Disp: 2.5 mL, Rfl: 5  •  triamcinolone (Kenalog) 0.1 % ointment, Apply daily to itchy area of vulva x 2 weeks or until itching resolved, then do maintenance dosing at twice weekly x 3 months., Disp: 60 g, Rfl: 2     Allergies:  Azithromycin, Bee pollen, Nut - unspecified, Pollen extracts, Prednisone, Strawberry, Doxycycline, Duloxetine, Fluoxetine hcl, Grass pollen, and House dust       Objective  Vital Signs:  /80 (BP Location: Left arm, Patient Position: Sitting, BP Cuff Size: Large adult)   Pulse 94   Ht 1.6 m (5' 3\")   Wt 111 kg (244 lb)   SpO2 97%   BMI 43.22 kg/m²     Physical Exam:  General: no acute distress  HEENT: EOMI, no scleral icterus.  Lungs: Clear to auscultation bilaterally without wheezing, rales, or rhonchi.  Cardiovascular: Regular rhythm and rate. Normal S1 and S2. No murmurs, rubs, or gallops are appreciated. JVP normal.  Abdomen: Soft, nontender, nondistended. Bowel sounds present.  Extremities: trace edema BLE.  Neurologic: Alert and oriented x3.    Pertinent Recent Cardiovascular Studies:  Vascular studies:  [facility] Venous duplex 8/28/24: DVT distal fem and popliteal, peroneal, PTV    Laboratory values:  CMP:  Recent Labs     08/31/24  0031 08/12/24  0723 01/18/24  1436 12/06/23  1306 11/28/23  1724 11/26/23  0101 07/03/23  0759 03/24/23  0329 03/17/23  0823 05/16/22  1648 04/09/22  0840 12/25/21  1303 12/13/21  0500    139  --  139 139 134* 139 137 139   < > 138   < > 136   K 3.7 4.3  --  4.1 3.5 3.9 4.6 3.8 4.2   < > 4.1   < > 5.5*    103  --  105 104 101 106 104 102   < > 104   < > 104   CO2 26 28  --  26 27 22 28 24 28   < > 25  "  < > 23   ANIONGAP 12 12  --  12 12 15 10 13 13   < > 13   < > 15   BUN 11 13 12 11 12 9 13 10 13   < > 12   < > 12   CREATININE 0.67 0.70 0.73 0.73 0.75 0.79 0.75 0.68 0.67   < > 0.78   < > 0.73   EGFR >90 >90 >90 >90 >90 >90  --   --   --   --   --   --   --    MG  --   --   --   --   --   --   --   --   --   --  1.80  --  2.20    < > = values in this interval not displayed.     Recent Labs     08/31/24  0031 08/12/24  0723 12/06/23  1306 11/28/23  1724 07/03/23  0759 03/24/23  0329 03/17/23  0823 07/27/22  1550 06/30/22  2036 06/08/22  0732 05/16/22  1648 04/09/22  0840 03/25/22  2043   ALBUMIN 4.0 4.1 4.0 4.3 4.2   < > 4.3   < > 4.5 4.5 4.8   < > 4.6   ALKPHOS 73 70 63 66 60   < > 56   < > 81 95 86   < > 94   ALT 21 20 20 21 15   < > 17   < > 18 21 33   < > 40   AST 18 15 21 16 14   < > 15   < > 16 17 22   < > 29   BILITOT 0.3 0.4 0.3 0.3 0.3   < > 0.4   < > 0.2 0.5 0.3   < > 0.3   LIPASE  --   --   --   --   --   --  31  --  39 27 50  --  37    < > = values in this interval not displayed.     CBC:  Recent Labs     09/13/24  0811 08/31/24  0031 08/12/24  0723 12/06/23  1306 11/28/23  1724 11/26/23  0101 07/03/23  0800 03/24/23  0329   WBC 8.4 9.9 8.4 9.3 8.2 11.4* 6.5 9.6   HGB 13.3 13.3 12.8 12.5 13.1 13.8 12.8 12.4   HCT 41.2 39.1 39.4 37.2 39.5 41.3 40.4 37.8    333 298 339 352 346 342 313   MCV 89 85 88 86 87 86 90 85     COAG:   Recent Labs     09/13/24  0811 03/24/23  0329 12/08/22  0814 11/21/22  1040 07/28/22  1234 07/28/22  0745 07/28/22  0258 07/27/22  2237 07/27/22  1550 04/09/22  0840 01/01/22  0950 12/25/21  1303 08/19/19  1034   INR 1.1 1.2* 1.2*  --   --   --   --   --  1.3*  --   --   --  1.0   HAUF  --   --   --   --  0.6 0.7 0.8 0.7  --   --   --   --   --    DDIMERVTE  --  524*  --  311  --   --   --   --   --  805* 441 420 830*     HEME/ENDO:  Recent Labs     09/13/24  0811 08/31/24  0123 08/12/24  0723 07/03/23  0800 07/03/23  0759 03/17/23  0823 10/14/22  0810 08/26/22  0754  01/31/22  0728   FERRITIN 74  --  42 26  --  27   < >  --   --    IRONSAT 47*  --  35 16*  --  15*   < > 12*  --    TSH 2.96 3.05 5.51*  --  2.58  --    < > 1.85 2.96   HGBA1C  --   --  6.7*  --  5.4  --   --  5.2 6.9*    < > = values in this interval not displayed.      CARDIAC:   Recent Labs     08/31/24  0123 08/31/24  0031 12/07/22  1513 11/21/22  1040 10/16/22  1153 07/31/22  1454 07/31/22  1425 07/28/22  1446 07/28/22  0258 07/27/22  1550 07/27/22  1550 01/01/22  0950 12/25/21  1300   LDH  --   --  156  --   --   --   --   --   --   --   --   --   --    TROPHS 4 4  --  <3 3 <3 <3 <3 3   < > <3  --   --    BNP  --   --   --  11  --   --   --   --   --   --  10 16 30    < > = values in this interval not displayed.     Recent Labs     08/12/24  0723 07/03/23  0759 08/26/22  0754 01/31/22  0728   CHOL 172 200* 173 219*   LDLF 93 125* 92 128*   HDL 48.4 57.7 51.9 45.0   TRIG 154* 89 144 230*     MICRO:   Recent Labs     12/07/22  1513   CRP 0.30        I have personally reviewed most recent PCP, cardiology, vascular, and/or podiatry documentation.      Assessment/Plan  44 y.o. female with  recurrent DVT (alternating BLE)  in the background of diabetes mellitus and obesity (BMI >= 30 kg/m2).  Initial two appear provoked but most recent without provocation.    Prior testing was negative for hypercoagulable state. D dimer testing sporadically + in past, most recent positive in 2023 may not be associated with any known thrombus/AC.    Plan:  Reviewed indications for long term AC  Pt hesitant about DOAC reattempt - I proposed Xarelto but she worries about similar side effects as Eliquis  I also discussed warfarin long term as well, but patient would like some time to think about it  I do not think Lovenox long term is a durable option, but patient seems to be leaning more towards this  For this acute episode, can continue Lovenox at current dose until repeat scan in February  Would like to have Vascular Medicine weigh  in - pt previously seen by Dr. Jones - can return back to her for re-eval       Thang Vinson MD, FACC, FSCAI, RPVI  Co-Director, Vascular Center, and  Co-Director, Pulmonary Embolism Response Team,  [facility] [place]                            of Medicine,                                                                [facility] [employer]

## 2024-10-24 NOTE — PROGRESS NOTES
PCP: DO Venkata Saenz   Tsering Carranza is a 44 y.o. female with history of DVT on Lovenox, hypothyroidism, diabetes who is here for follow up of  recurrent DVT  --  reports worsening symptoms after she bumped her leg.     Patient is following up for a blood clot for the third time. Patient wants to know if there will be a long term AC indicated.    DVT hx:  1st - pregnancy 2015 - lovenox x duration of pregnancy (LLE)  2nd - surgery 2022 - lovenox x 3 months (RLE)     Had prior sxs with Eliquis (?dizziness).  No other family hx VTE, but her mother was adopted so do not know other family hx. No miscarriages per pt report.    Factor V, protein C & S, prothrombin gene, APLS - negative based on Epic search.    Review of Systems:  Otherwise, limited cardiovascular review of systems is negative.    Past Medical History:  She has a past medical history of Allergic rhinitis (09/17/2016), Calculus of kidney (06/16/2022), Depression, History of DVT (deep vein thrombosis), Hypothyroidism (01/31/2014), Polycystic ovarian syndrome (06/15/2020), Type 2 diabetes mellitus (Multi), and Type 2 diabetes mellitus without complications (Multi) (08/26/2022).    Surgical History:   She has a past surgical history that includes Other surgical history (05/16/2022); Other surgical history (09/14/2022); and Other surgical history (11/20/2020).    Family History:   Family History   Problem Relation Name Age of Onset    Cholelithiasis Mother      Hyperlipidemia Mother      Other (PREDIABETES) Mother      Depression Father      Hypertension Father      Asthma Sister      Asthma Daughter      Other (CARDIAC DISORDER) Other GRANDFATHER      Family History   Problem Relation Name Age of Onset    Cholelithiasis Mother      Hyperlipidemia Mother      Other (PREDIABETES) Mother      Depression Father      Hypertension Father      Asthma Sister      Asthma Daughter      Other (CARDIAC DISORDER) Other GRANDFATHER        Social  History:   Tobacco Use: Low Risk  (10/4/2024)    Received [facility]    Patient History     Smoking Tobacco Use: Never     Smokeless Tobacco Use: Never     Passive Exposure: Never       Outpatient Medications:    Current Outpatient Medications:     alcohol swabs pads, medicated, To test once daily, Disp: 100 each, Rfl: 1    blood sugar diagnostic (Blood Glucose Test) strip, To test once daily, Disp: 100 strip, Rfl: 1    cholecalciferol (Vitamin D-3) 50 MCG (2000 UT) tablet, Take 1 tablet (50 mcg) by mouth once daily., Disp: 90 tablet, Rfl: 1    efinaconazole (Jublia) 10 % solution with applicator, Apply to affected nails nightly., Disp: 8 mL, Rfl: 3    enoxaparin (Lovenox) 100 mg/mL syringe, Inject 1 mL (100 mg) under the skin every 12 hours., Disp: 60 mL, Rfl: 0    EPINEPHrine (EpiPen 2-Wilfrido) 0.3 mg/0.3 mL injection syringe, Inject into lateral thigh for anaphylaxis. Call 911 after use. May repeat after 5 minutes if not effective., Disp: 2 each, Rfl: 1    EPINEPHrine 0.3 mg/0.3 mL injection syringe, Inject 0.3 mL (0.3 mg) as directed 1 time., Disp: , Rfl:     ferrous fumarate-vitamin C (Oswaldo-Sequeles 65-25), Take 1 tablet by mouth 3 times a day with meals. Do not crush, chew, or split., Disp: , Rfl:     ketoconazole (NIZOral) 2 % cream, Apply to soles and in between the toes once daily for maintenance., Disp: 60 g, Rfl: 3    lancets misc, To test once daily, Disp: 100 each, Rfl: 1    levothyroxine (Synthroid, Levoxyl) 125 mcg tablet, Take 1 tablet (125 mcg) by mouth once daily., Disp: 30 tablet, Rfl: 1    olopatadine (Pataday) 0.2 % ophthalmic solution, Administer 1 drop into affected eye(s) once daily as needed for allergies (itchy eyes)., Disp: 2.5 mL, Rfl: 5    triamcinolone (Kenalog) 0.1 % ointment, Apply daily to itchy area of vulva x 2 weeks or until itching resolved, then do maintenance dosing at twice weekly x 3 months., Disp: 60 g, Rfl: 2     Allergies:  Azithromycin, Bee pollen, Nut -  "unspecified, Pollen extracts, Prednisone, Strawberry, Doxycycline, Duloxetine, Fluoxetine hcl, Grass pollen, and House dust       Objective   Vital Signs:  /80 (BP Location: Left arm, Patient Position: Sitting, BP Cuff Size: Large adult)   Pulse 94   Ht 1.6 m (5' 3\")   Wt 111 kg (244 lb)   SpO2 97%   BMI 43.22 kg/m²     Physical Exam:  General: no acute distress  HEENT: EOMI, no scleral icterus.  Lungs: Clear to auscultation bilaterally without wheezing, rales, or rhonchi.  Cardiovascular: Regular rhythm and rate. Normal S1 and S2. No murmurs, rubs, or gallops are appreciated. JVP normal.  Abdomen: Soft, nontender, nondistended. Bowel sounds present.  Extremities: trace edema BLE.  Neurologic: Alert and oriented x3.    Pertinent Recent Cardiovascular Studies:  Vascular studies:  [facility] Venous duplex 8/28/24: DVT distal fem and popliteal, peroneal, PTV    Laboratory values:  CMP:  Recent Labs     08/31/24  0031 08/12/24  0723 01/18/24  1436 12/06/23  1306 11/28/23  1724 11/26/23  0101 07/03/23  0759 03/24/23  0329 03/17/23  0823 05/16/22  1648 04/09/22  0840 12/25/21  1303 12/13/21  0500    139  --  139 139 134* 139 137 139   < > 138   < > 136   K 3.7 4.3  --  4.1 3.5 3.9 4.6 3.8 4.2   < > 4.1   < > 5.5*    103  --  105 104 101 106 104 102   < > 104   < > 104   CO2 26 28  --  26 27 22 28 24 28   < > 25   < > 23   ANIONGAP 12 12  --  12 12 15 10 13 13   < > 13   < > 15   BUN 11 13 12 11 12 9 13 10 13   < > 12   < > 12   CREATININE 0.67 0.70 0.73 0.73 0.75 0.79 0.75 0.68 0.67   < > 0.78   < > 0.73   EGFR >90 >90 >90 >90 >90 >90  --   --   --   --   --   --   --    MG  --   --   --   --   --   --   --   --   --   --  1.80  --  2.20    < > = values in this interval not displayed.     Recent Labs     08/31/24  0031 08/12/24  0723 12/06/23  1306 11/28/23  1724 07/03/23  0759 03/24/23  0329 03/17/23  0823 07/27/22  1550 06/30/22  2036 06/08/22  0732 05/16/22  1648 04/09/22  0840 03/25/22  2043   ALBUMIN " 4.0 4.1 4.0 4.3 4.2   < > 4.3   < > 4.5 4.5 4.8   < > 4.6   ALKPHOS 73 70 63 66 60   < > 56   < > 81 95 86   < > 94   ALT 21 20 20 21 15   < > 17   < > 18 21 33   < > 40   AST 18 15 21 16 14   < > 15   < > 16 17 22   < > 29   BILITOT 0.3 0.4 0.3 0.3 0.3   < > 0.4   < > 0.2 0.5 0.3   < > 0.3   LIPASE  --   --   --   --   --   --  31  --  39 27 50  --  37    < > = values in this interval not displayed.     CBC:  Recent Labs     09/13/24  0811 08/31/24  0031 08/12/24  0723 12/06/23  1306 11/28/23  1724 11/26/23  0101 07/03/23  0800 03/24/23  0329   WBC 8.4 9.9 8.4 9.3 8.2 11.4* 6.5 9.6   HGB 13.3 13.3 12.8 12.5 13.1 13.8 12.8 12.4   HCT 41.2 39.1 39.4 37.2 39.5 41.3 40.4 37.8    333 298 339 352 346 342 313   MCV 89 85 88 86 87 86 90 85     COAG:   Recent Labs     09/13/24  0811 03/24/23  0329 12/08/22  0814 11/21/22  1040 07/28/22  1234 07/28/22  0745 07/28/22  0258 07/27/22  2237 07/27/22  1550 04/09/22  0840 01/01/22  0950 12/25/21  1303 08/19/19  1034   INR 1.1 1.2* 1.2*  --   --   --   --   --  1.3*  --   --   --  1.0   HAUF  --   --   --   --  0.6 0.7 0.8 0.7  --   --   --   --   --    DDIMERVTE  --  524*  --  311  --   --   --   --   --  805* 441 420 830*     HEME/ENDO:  Recent Labs     09/13/24  0811 08/31/24  0123 08/12/24  0723 07/03/23  0800 07/03/23  0759 03/17/23  0823 10/14/22  0810 08/26/22  0754 01/31/22  0728   FERRITIN 74  --  42 26  --  27   < >  --   --    IRONSAT 47*  --  35 16*  --  15*   < > 12*  --    TSH 2.96 3.05 5.51*  --  2.58  --    < > 1.85 2.96   HGBA1C  --   --  6.7*  --  5.4  --   --  5.2 6.9*    < > = values in this interval not displayed.      CARDIAC:   Recent Labs     08/31/24  0123 08/31/24  0031 12/07/22  1513 11/21/22  1040 10/16/22  1153 07/31/22  1454 07/31/22  1425 07/28/22  1446 07/28/22  0258 07/27/22  1550 07/27/22  1550 01/01/22  0950 12/25/21  1300   LDH  --   --  156  --   --   --   --   --   --   --   --   --   --    TROPHS 4 4  --  <3 3 <3 <3 <3 3   < > <3  --    --    BNP  --   --   --  11  --   --   --   --   --   --  10 16 30    < > = values in this interval not displayed.     Recent Labs     08/12/24  0723 07/03/23  0759 08/26/22  0754 01/31/22  0728   CHOL 172 200* 173 219*   LDLF 93 125* 92 128*   HDL 48.4 57.7 51.9 45.0   TRIG 154* 89 144 230*     MICRO:   Recent Labs     12/07/22  1513   CRP 0.30        I have personally reviewed most recent PCP, cardiology, vascular, and/or podiatry documentation.      Assessment/Plan   44 y.o. female with  recurrent DVT (alternating BLE)  in the background of diabetes mellitus and obesity (BMI >= 30 kg/m2).  Initial two appear provoked but most recent without provocation.    Prior testing was negative for hypercoagulable state. D dimer testing sporadically + in past, most recent positive in 2023 may not be associated with any known thrombus/AC.    Plan:  Reviewed indications for long term AC  Pt hesitant about DOAC reattempt - I proposed Xarelto but she worries about similar side effects as Eliquis  I also discussed warfarin long term as well, but patient would like some time to think about it  I do not think Lovenox long term is a durable option, but patient seems to be leaning more towards this  For this acute episode, can continue Lovenox at current dose until repeat scan in February  Would like to have Vascular Medicine weigh in - pt previously seen by Dr. Jones - can return back to her for re-eval       Thang Vinson MD, FACC, FSCAI, RPVI  Co-Director, Vascular Center, and  Co-Director, Pulmonary Embolism Response Team,  [facility]                            of Medicine,                                                                [facility] [employer]

## 2024-11-01 ENCOUNTER — HOSPITAL ENCOUNTER (OUTPATIENT)
Dept: RADIOLOGY | Facility: CLINIC | Age: 44
End: 2024-11-01
Payer: MEDICARE

## 2024-11-04 DIAGNOSIS — F50.819 BINGE EATING DISORDER, UNSPECIFIED SEVERITY: Primary | ICD-10-CM

## 2024-11-11 ENCOUNTER — APPOINTMENT (OUTPATIENT)
Dept: OBSTETRICS AND GYNECOLOGY | Facility: CLINIC | Age: 44
End: 2024-11-11
Payer: COMMERCIAL

## 2024-11-12 ENCOUNTER — OFFICE VISIT (OUTPATIENT)
Dept: OBSTETRICS AND GYNECOLOGY | Facility: CLINIC | Age: 44
End: 2024-11-12
Payer: MEDICARE

## 2024-11-12 VITALS
SYSTOLIC BLOOD PRESSURE: 128 MMHG | DIASTOLIC BLOOD PRESSURE: 80 MMHG | BODY MASS INDEX: 43.23 KG/M2 | HEIGHT: 63 IN | WEIGHT: 244 LBS

## 2024-11-12 DIAGNOSIS — R10.2 PELVIC PAIN IN FEMALE: ICD-10-CM

## 2024-11-12 PROCEDURE — 87205 SMEAR GRAM STAIN: CPT

## 2024-11-12 PROCEDURE — 3074F SYST BP LT 130 MM HG: CPT | Performed by: OBSTETRICS & GYNECOLOGY

## 2024-11-12 PROCEDURE — 99213 OFFICE O/P EST LOW 20 MIN: CPT | Performed by: OBSTETRICS & GYNECOLOGY

## 2024-11-12 PROCEDURE — 87086 URINE CULTURE/COLONY COUNT: CPT

## 2024-11-12 PROCEDURE — 3008F BODY MASS INDEX DOCD: CPT | Performed by: OBSTETRICS & GYNECOLOGY

## 2024-11-12 PROCEDURE — 3079F DIAST BP 80-89 MM HG: CPT | Performed by: OBSTETRICS & GYNECOLOGY

## 2024-11-12 ASSESSMENT — ENCOUNTER SYMPTOMS
MUSCULOSKELETAL NEGATIVE: 0
PSYCHIATRIC NEGATIVE: 0
GASTROINTESTINAL NEGATIVE: 0
ENDOCRINE NEGATIVE: 0
NEUROLOGICAL NEGATIVE: 0
HEMATOLOGIC/LYMPHATIC NEGATIVE: 0
RESPIRATORY NEGATIVE: 0
EYES NEGATIVE: 0
CONSTITUTIONAL NEGATIVE: 0
CARDIOVASCULAR NEGATIVE: 0
ALLERGIC/IMMUNOLOGIC NEGATIVE: 0

## 2024-11-12 ASSESSMENT — PAIN SCALES - GENERAL: PAINLEVEL_OUTOF10: 0-NO PAIN

## 2024-11-12 NOTE — PROGRESS NOTES
Subjective   Patient ID: Tsering Carranza is a 44 y.o. female who presents for Pelvic Pain.  Pt has likely adenomyosis and 3-4 days of pelvic pain and discharge    Pt denies heavy bleeding   Pt also has a history of kidney stones         Review of Systems   Gastrointestinal:  Positive for abdominal pain.   Genitourinary:  Positive for pelvic pain.       Objective   Physical Exam  Exam conducted with a chaperone present.   Constitutional:       Appearance: She is obese.   Pulmonary:      Effort: Pulmonary effort is normal.   Genitourinary:     Vagina: Normal.      Uterus: Enlarged.       Comments: Slightly enlarged   Neurological:      Mental Status: She is alert.   Psychiatric:         Attention and Perception: Attention normal.         Mood and Affect: Mood normal.         Behavior: Behavior normal.         Assessment/Plan   Diagnoses and all orders for this visit:  Pelvic pain in female  -     Urine culture  -     Vaginitis Gram Stain For Bacterial Vaginosis + Yeast; Future           Caro Thornton MD 11/12/24 2:59 PM

## 2024-11-13 LAB
BACTERIA UR CULT: NORMAL
BACTERIAL VAGINOSIS VAG-IMP: NORMAL
CLUE CELLS VAG LPF-#/AREA: NORMAL /[LPF]
NUGENT SCORE: 6
YEAST VAG WET PREP-#/AREA: NORMAL

## 2024-11-13 ASSESSMENT — ENCOUNTER SYMPTOMS: ABDOMINAL PAIN: 1

## 2024-11-14 ENCOUNTER — APPOINTMENT (OUTPATIENT)
Dept: PRIMARY CARE | Facility: CLINIC | Age: 44
End: 2024-11-14
Payer: MEDICARE

## 2024-11-15 ENCOUNTER — HOSPITAL ENCOUNTER (OUTPATIENT)
Dept: RADIOLOGY | Facility: CLINIC | Age: 44
Discharge: HOME | End: 2024-11-15
Payer: MEDICARE

## 2024-11-15 DIAGNOSIS — N20.0 STONE IN KIDNEY: ICD-10-CM

## 2024-11-15 PROCEDURE — 74176 CT ABD & PELVIS W/O CONTRAST: CPT

## 2024-11-18 ENCOUNTER — APPOINTMENT (OUTPATIENT)
Dept: OBSTETRICS AND GYNECOLOGY | Facility: CLINIC | Age: 44
End: 2024-11-18
Payer: MEDICARE

## 2024-11-18 ENCOUNTER — TELEPHONE (OUTPATIENT)
Dept: OBSTETRICS AND GYNECOLOGY | Facility: CLINIC | Age: 44
End: 2024-11-18

## 2024-11-18 DIAGNOSIS — N89.8 VAGINAL DISCHARGE: ICD-10-CM

## 2024-11-18 DIAGNOSIS — N76.1 CHRONIC VAGINITIS: ICD-10-CM

## 2024-11-18 RX ORDER — METRONIDAZOLE 7.5 MG/G
GEL VAGINAL DAILY
Qty: 70 G | Refills: 0 | Status: SHIPPED | OUTPATIENT
Start: 2024-11-18 | End: 2024-11-23

## 2024-11-18 NOTE — TELEPHONE ENCOUNTER
Called pt, no answer, left voicemail for return call  Schedule appt to discuss results/consultation  Pt sent message on [employer] as well with this information

## 2024-11-21 ENCOUNTER — APPOINTMENT (OUTPATIENT)
Dept: ENDOCRINOLOGY | Facility: CLINIC | Age: 44
End: 2024-11-21
Payer: COMMERCIAL

## 2024-11-22 ENCOUNTER — APPOINTMENT (OUTPATIENT)
Dept: OBSTETRICS AND GYNECOLOGY | Facility: HOSPITAL | Age: 44
End: 2024-11-22
Payer: MEDICARE

## 2024-11-25 ENCOUNTER — TELEPHONE (OUTPATIENT)
Dept: PRIMARY CARE | Facility: CLINIC | Age: 44
End: 2024-11-25

## 2024-11-25 ENCOUNTER — APPOINTMENT (OUTPATIENT)
Dept: OBSTETRICS AND GYNECOLOGY | Facility: CLINIC | Age: 44
End: 2024-11-25
Payer: MEDICARE

## 2024-11-25 DIAGNOSIS — E03.9 HYPOTHYROIDISM, UNSPECIFIED TYPE: ICD-10-CM

## 2024-11-25 RX ORDER — LEVOTHYROXINE SODIUM 112 UG/1
112 TABLET ORAL DAILY
Qty: 90 TABLET | Refills: 1 | Status: SHIPPED | OUTPATIENT
Start: 2024-11-25

## 2024-12-03 ENCOUNTER — APPOINTMENT (OUTPATIENT)
Dept: OBSTETRICS AND GYNECOLOGY | Facility: CLINIC | Age: 44
End: 2024-12-03
Payer: MEDICARE

## 2024-12-05 ENCOUNTER — TELEMEDICINE (OUTPATIENT)
Dept: UROLOGY | Facility: CLINIC | Age: 44
End: 2024-12-05
Payer: MEDICARE

## 2024-12-05 DIAGNOSIS — N20.0 STONE IN KIDNEY: Primary | ICD-10-CM

## 2024-12-05 PROCEDURE — 99213 OFFICE O/P EST LOW 20 MIN: CPT | Performed by: PHYSICIAN ASSISTANT

## 2024-12-05 ASSESSMENT — ENCOUNTER SYMPTOMS
MUSCULOSKELETAL NEGATIVE: 1
ENDOCRINE NEGATIVE: 1
GASTROINTESTINAL NEGATIVE: 1
NEUROLOGICAL NEGATIVE: 1
PSYCHIATRIC NEGATIVE: 1
HEMATOLOGIC/LYMPHATIC NEGATIVE: 1
CARDIOVASCULAR NEGATIVE: 1
RESPIRATORY NEGATIVE: 1
CONSTITUTIONAL NEGATIVE: 1
EYES NEGATIVE: 1
ALLERGIC/IMMUNOLOGIC NEGATIVE: 1

## 2024-12-05 NOTE — PROGRESS NOTES
Subjective   Patient ID: Tsering Carranza is a 44 y.o. female who presents for No chief complaint on file..  HPI  Patient is a 45 yo female with recurrent renal stones previously managed with lithotripsy presents for follow up to review CT results.     Patient doing well. She denies bothersome urinary difficulties. She denies flank pain, hematuria or dysuria.    Patient had a recent TALIA showing largest stone measuring 9mm. CT scan shows multiple stones in the left kidney the largest measuring 4 mm    === 11/15/24 ===    CT ABDOMEN PELVIS WO IV CONTRAST    - Impression -  1.  Multiple nonobstructing renal calculi are visualized within the  left kidney measuring up to 0.4 cm in the left interpolar region and  within the inferior renal pole. No right-sided nephrolithiasis. No  hydronephrosis bilaterally.  2. No acute process within the abdomen or pelvis.  3. Hepatic steatosis and hepatomegaly. Correlate with serum LFTs.    MACRO:  None    Signed by: Uriel So 11/17/2024 11:41 AM  Dictation workstation:   [id number]      Review of Systems   Constitutional: Negative.    HENT: Negative.     Eyes: Negative.    Respiratory: Negative.     Cardiovascular: Negative.    Gastrointestinal: Negative.    Endocrine: Negative.    Genitourinary: Negative.    Musculoskeletal: Negative.    Skin: Negative.    Allergic/Immunologic: Negative.    Neurological: Negative.    Hematological: Negative.    Psychiatric/Behavioral: Negative.         Objective   Physical Exam  Constitutional:       General: She is not in acute distress.     Appearance: Normal appearance.   HENT:      Head: Normocephalic and atraumatic.      Nose: Nose normal.   Pulmonary:      Effort: Pulmonary effort is normal.   Musculoskeletal:         General: Normal range of motion.      Cervical back: Normal range of motion.   Skin:     General: Skin is warm and dry.   Neurological:      General: No focal deficit present.      Mental Status: She is alert and  oriented to person, place, and time.   Psychiatric:         Mood and Affect: Mood normal.         Assessment/Plan     Renal stones  I discussed Ct results with patient showing multiples stones in the left kidney. I discussed presence of two stones measuring 4 mm. Her stone burden slightly increased compared to last year.     I discussed increasing fluid intake to at least 2.5 L a day.   Reduce salt to 2 g a day  Add lemon to water  Reduce oxalate in diet    Follow up in 1 year with a prior TALIA            Abdirahman Stone PA-C 12/05/24 2:00 PM

## 2024-12-06 ENCOUNTER — APPOINTMENT (OUTPATIENT)
Dept: OBSTETRICS AND GYNECOLOGY | Facility: CLINIC | Age: 44
End: 2024-12-06
Payer: MEDICARE

## 2024-12-06 VITALS — WEIGHT: 245 LBS | DIASTOLIC BLOOD PRESSURE: 80 MMHG | BODY MASS INDEX: 43.4 KG/M2 | SYSTOLIC BLOOD PRESSURE: 140 MMHG

## 2024-12-06 DIAGNOSIS — N89.8 VAGINAL DISCHARGE: Primary | ICD-10-CM

## 2024-12-06 PROCEDURE — 3077F SYST BP >= 140 MM HG: CPT | Performed by: OBSTETRICS & GYNECOLOGY

## 2024-12-06 PROCEDURE — 87205 SMEAR GRAM STAIN: CPT

## 2024-12-06 PROCEDURE — 99214 OFFICE O/P EST MOD 30 MIN: CPT | Performed by: OBSTETRICS & GYNECOLOGY

## 2024-12-06 PROCEDURE — 3079F DIAST BP 80-89 MM HG: CPT | Performed by: OBSTETRICS & GYNECOLOGY

## 2024-12-06 RX ORDER — CLINDAMYCIN PHOSPHATE 20 MG/G
1 CREAM VAGINAL NIGHTLY
Qty: 40 G | Refills: 0 | Status: SHIPPED | OUTPATIENT
Start: 2024-12-06 | End: 2024-12-13

## 2024-12-06 RX ORDER — TERCONAZOLE 8 MG/G
1 CREAM VAGINAL NIGHTLY
Qty: 20 G | Refills: 1 | Status: SHIPPED | OUTPATIENT
Start: 2024-12-06 | End: 2024-12-09

## 2024-12-06 ASSESSMENT — ENCOUNTER SYMPTOMS
CONSTIPATION: 0
SORE THROAT: 0
NAUSEA: 0
VOMITING: 0
FREQUENCY: 1
HEADACHES: 0
FLANK PAIN: 1
CHILLS: 0
ANOREXIA: 0
DIARRHEA: 0
HEMATURIA: 0
FEVER: 0
BACK PAIN: 0
DYSURIA: 0
ABDOMINAL PAIN: 0

## 2024-12-07 LAB
CLUE CELLS VAG LPF-#/AREA: NORMAL /[LPF]
NUGENT SCORE: 2
YEAST VAG WET PREP-#/AREA: NORMAL

## 2024-12-08 NOTE — PROGRESS NOTES
Tsering Carranza is a 44 y.o. female who presents with a chief complaint of Vaginal Discharge (Vaginal pain with discharge)  SUBJECTIVE  She has been concerned about vaginal pain and discharge.  She was seen November 12, 2024 by another provider for BV and yeast that was negative.    She did use metronidazole.  She was negative for Ureaplasma in August 2024.    She did have an MRI with Dr. Wolf in March 2024 that showed a uterus of 9.6 cm with an endometrium showing suggestion of a 2.1 cm polyp.  She may have adenomyosis.    The ovaries have physiologic cysts.    She has not been sexually active in years.  Her Pap and January 2022 was normal.    Past Medical History:   Diagnosis Date    Allergic rhinitis 09/17/2016    Allergic rhinitis due to dust mite, animal dander, pollen    Calculus of kidney 06/16/2022    Kidney stone on left side    Depression     History of DVT (deep vein thrombosis)     2015 pregnancy    Hypothyroidism 01/31/2014    on Levo    Polycystic ovarian syndrome 06/15/2020    PCOS (polycystic ovarian syndrome)    Type 2 diabetes mellitus     Type 2 diabetes mellitus without complications (Multi) 08/26/2022    Diet-controlled diabetes mellitus     Past Surgical History:   Procedure Laterality Date    OTHER SURGICAL HISTORY  05/16/2022    Tonsillectomy    OTHER SURGICAL HISTORY  09/14/2022    Cholecystectomy laparoscopic    OTHER SURGICAL HISTORY  11/20/2020    Lithotripsy     Social History     Socioeconomic History    Marital status:    Tobacco Use    Smoking status: Never     Passive exposure: Never    Smokeless tobacco: Never   Substance and Sexual Activity    Alcohol use: Never    Drug use: Never     Social Drivers of Health     Stress: Stress Concern Present (8/23/2023)    [employer] - Occupational Stress Questionnaire     Feeling of Stress : Rather much     Family History   Problem Relation Name Age of Onset    Cholelithiasis Mother       Hyperlipidemia Mother      Other (PREDIABETES) Mother      Depression Father      Hypertension Father      Asthma Sister      Asthma Daughter      Other (CARDIAC DISORDER) Other GRANDFATHER        OB History    Para Term  AB Living   2 2 1 1       SAB IAB Ectopic Multiple Live Births                  # Outcome Date GA Lbr Yariel/2nd Weight Sex Type Anes PTL Lv   2   36w0d             Birth Comments: HTN, DVT, daughter had jaundice   1 Term  39w0d    Vag-Spont         Birth Comments: HTN       OBJECTIVE  Allergies   Allergen Reactions    Azithromycin Dizziness and Other    Bee Pollen Itching    Nut - Unspecified Itching    Pollen Extracts Itching    Prednisone Other    Strawberry Itching    Doxycycline Hives    Duloxetine Other and Unknown     change in mental heart racing elevated BP    Fluoxetine Hcl Anxiety and Unknown     Panic, high anxiety, fatigue    Grass Pollen Itching and Rash    House Dust Itching and Rash      (Not in a hospital admission)       Review of Systems  Negative except she feels there is chronic daily discharge..  Physical Exam  General Appearance: well developed, well nourished  On exam, the external genitalia are normal, no lesions.  On speculum exam a swab was collected for BV and yeast.  There was a small amount of white discharge.  No lesions.  Bimanual exam nontender.  /80   Wt 111 kg (245 lb)   LMP 10/22/2024   BMI 43.40 kg/m²    Problem List Items Addressed This Visit    None  Visit Diagnoses       Vaginal discharge    -  Primary    Relevant Medications    clindamycin (Cleocin) 2 % vaginal cream    terconazole (Terazol 3) 0.8 % vaginal cream    Other Relevant Orders    Vaginitis Gram Stain For Bacterial Vaginosis + Yeast (Completed)        This is a 44-year-old female that has symptomatic vaginal discharge.    Review of the chart suggests an MRI with endometrial polyp.  We did discuss that a polyp could be a factor for the persistent discharge, as  recent testing for STDs, and Ureaplasma were negative.  I did repeat a swab for BV and yeast.  She did not tolerate metronidazole gel, there was burning, so I ordered Cleocin vaginal cream for current symptoms.   I did order Terazol 3 to her pharmacy.    I recommended returning for endometrial biopsy, we then may need to proceed with hysteroscopy D&C.  Recommend 600 mg of ibuprofen 1 hour before the biopsy visit.      Answers submitted by the patient for this visit:  Female Genital Questionnaire (Submitted on 12/6/2024)  Chief Complaint: Female genitourinary complaint  genital itching: No  genital lesions: No  genital odor: Yes  genital rash: No  missed menses: Yes  pelvic pain: Yes  vaginal bleeding: Yes  vaginal discharge: Yes  Chronicity: new  Onset: 1 to 4 weeks ago  Frequency: daily  Progression since onset: waxing and waning  Pain severity: mild  Affected side: both  Pregnant now?: No  abdominal pain: No  anorexia: No  back pain: No  chills: No  constipation: No  diarrhea: No  discolored urine: No  dysuria: No  fever: No  flank pain: Yes  frequency: Yes  headaches: No  hematuria: No  joint pain: No  joint swelling: No  nausea: No  painful intercourse: No  rash: No  sore throat: No  urgency: No  vomiting: No  Aggravated by: tactile pressure  Sexual activity: not sexually active  Partner with STD symptoms: no  Birth control: abstinence  Menstrual history: irregular  Vaginal bleeding: lighter than menses  Passing clots?: No  Passing tissue?: No  Discharge characteristics: clear, copious, malodorous, watery

## 2024-12-12 ENCOUNTER — TELEPHONE (OUTPATIENT)
Dept: OBSTETRICS AND GYNECOLOGY | Facility: CLINIC | Age: 44
End: 2024-12-12
Payer: MEDICARE

## 2024-12-12 NOTE — TELEPHONE ENCOUNTER
Patient had results that came back negative but medication was sent into pharmacy. Patient would like to know if she should take the medication that was prescribed.    English Wooten

## 2024-12-13 NOTE — TELEPHONE ENCOUNTER
Recent testing for BV, yeast was negative.  She was prescribed clindamycin vaginal cream, but now that testing is negative I do recommend holding on treatment.  The clindamycin could cause a yeast infection and currently yeast is negative.  To maintain vaginal pH, you may try over-the-counter probiotics such as Culturelle, AZO, Rephresh.  Often BV symptoms will wax and wane.  They can recur and then the clindamycin vaginal cream could be useful in the future.

## 2024-12-16 NOTE — TELEPHONE ENCOUNTER
Called patient and gave her providers recommendations. Patient verbalized understanding.    English Wooten

## 2024-12-17 ENCOUNTER — APPOINTMENT (OUTPATIENT)
Dept: PRIMARY CARE | Facility: CLINIC | Age: 44
End: 2024-12-17
Payer: COMMERCIAL

## 2024-12-19 ENCOUNTER — APPOINTMENT (OUTPATIENT)
Dept: PRIMARY CARE | Facility: CLINIC | Age: 44
End: 2024-12-19
Payer: COMMERCIAL

## 2025-01-21 ENCOUNTER — APPOINTMENT (OUTPATIENT)
Dept: ENDOCRINOLOGY | Facility: CLINIC | Age: 45
End: 2025-01-21
Payer: COMMERCIAL

## 2025-01-27 ENCOUNTER — APPOINTMENT (OUTPATIENT)
Dept: PULMONOLOGY | Facility: CLINIC | Age: 45
End: 2025-01-27
Payer: MEDICARE

## 2025-01-30 ENCOUNTER — APPOINTMENT (OUTPATIENT)
Dept: OBSTETRICS AND GYNECOLOGY | Facility: CLINIC | Age: 45
End: 2025-01-30
Payer: MEDICARE

## 2025-02-01 ENCOUNTER — OFFICE VISIT (OUTPATIENT)
Dept: URGENT CARE | Age: 45
End: 2025-02-01
Payer: MEDICARE

## 2025-02-01 VITALS
DIASTOLIC BLOOD PRESSURE: 96 MMHG | TEMPERATURE: 97.9 F | OXYGEN SATURATION: 95 % | HEIGHT: 63 IN | RESPIRATION RATE: 18 BRPM | WEIGHT: 243.4 LBS | BODY MASS INDEX: 43.12 KG/M2 | SYSTOLIC BLOOD PRESSURE: 171 MMHG | HEART RATE: 94 BPM

## 2025-02-01 DIAGNOSIS — M79.605 LEG PAIN, MEDIAL, LEFT: Primary | ICD-10-CM

## 2025-02-01 ASSESSMENT — PATIENT HEALTH QUESTIONNAIRE - PHQ9
2. FEELING DOWN, DEPRESSED OR HOPELESS: SEVERAL DAYS
1. LITTLE INTEREST OR PLEASURE IN DOING THINGS: SEVERAL DAYS
SUM OF ALL RESPONSES TO PHQ9 QUESTIONS 1 AND 2: 2
10. IF YOU CHECKED OFF ANY PROBLEMS, HOW DIFFICULT HAVE THESE PROBLEMS MADE IT FOR YOU TO DO YOUR WORK, TAKE CARE OF THINGS AT HOME, OR GET ALONG WITH OTHER PEOPLE: NOT DIFFICULT AT ALL

## 2025-02-01 ASSESSMENT — ENCOUNTER SYMPTOMS
LOSS OF SENSATION IN FEET: 0
OCCASIONAL FEELINGS OF UNSTEADINESS: 0
DEPRESSION: 1

## 2025-02-01 ASSESSMENT — PAIN SCALES - GENERAL: PAINLEVEL_OUTOF10: 4

## 2025-02-01 NOTE — PROGRESS NOTES
Patient presents with concern for DVT. She states she has had 2 DVT's in the left leg and her PCP just lowered her Lovenox dose about 1 week ago. She states now she is having pain in the left inner upper thigh and posterior knee area. She states the leg is more swollen then normal. She denies any chest pain or shortness of breath. Patient advised that she will need an US of her left leg and further evaluation in the ER.

## 2025-02-05 ENCOUNTER — APPOINTMENT (OUTPATIENT)
Dept: PRIMARY CARE | Facility: CLINIC | Age: 45
End: 2025-02-05
Payer: MEDICARE

## 2025-02-07 ENCOUNTER — APPOINTMENT (OUTPATIENT)
Dept: PRIMARY CARE | Facility: CLINIC | Age: 45
End: 2025-02-07
Payer: MEDICARE

## 2025-02-07 DIAGNOSIS — F50.819 BINGE EATING DISORDER, UNSPECIFIED SEVERITY: ICD-10-CM

## 2025-02-07 PROCEDURE — 97802 MEDICAL NUTRITION INDIV IN: CPT | Performed by: DIETITIAN, REGISTERED

## 2025-02-07 NOTE — PROGRESS NOTES
"Nutrition Assessment     Reason for Visit:  Tsering Carranza is a 44 y.o. female who presents for Nutrition Counseling (DE hx)    Anthropometrics:            Food And Nutrient Intake:  Food and Nutrient History  Food and Nutrient History: Pt reports hx of restriction and disordered eating patterns since 11 or 12 yrs old. She remembers restricting to <500 kcals daily in HS (down to 100 lbs). She abused laxatives and engaged in compulsive exercise at the time also. Has struggled with food rules, guilt, negative BI since that time. Hx of chronic dieting and weight cycling also. Feels that she shouldn't be eating certain foods - significant calorie fears and \"bad\" food judgments. All/nothing thinking with food. T2DM complicates this narrative also. Internalized weight bias and stigma make relationship with food and body very hard as well. Works at home, in [employer]. Walks for 20 min in house when able. Used to do a lot of strength training. Felt her best when doing this before both of her pregnancies. SMBG BID approx - 190-210mg/dL per pt.  Energy Intake: Fair 50-75 %  Food Allergy: Tree nuts, Other (Comment) (strawberry, oral allergy syndrome)  Food Intolerance: IBS - onions, also does not like fish or shellfish, will eat tuna occasionally  Sleep Duration/Quality: 5-6 hrs disrupted (wakes up 2-4x 2/2 PTSD)     Food Intake  Meal 1: [employer] burrito or skips on weekends  Snacks: avoids  Meal 2: soup, frozen meal, orders out  Snacks : avoids  Meal 3: fast food, wings, pizza, pasta, etc  Snacks: avoids - usually wants something sweet, chocolate, ice cream  Food Variety: Absent (keeps many foods out of house for fear of bingeing/\"over-eating\")  Fluid Intake: water, lemonade (feels bad when she drinks this - worries about BGs)                                                        Factors:                         Physical Activities:              Knowledge Beliefs Attitudes and Behavior       Belief and Attitude " "Determination  Beliefs and Attitudes: Preoccupation with body shape, Negative nutrition self talk, Unscientific nutrition beliefs, Body image disturbance, Preoccupation with food, Preoccupation with body weight, Negative emotions about food and nutrition (SOC: contemplative)              Binge Eating and Purging Behavior  Binge Eating Behavior:  (unclear - will eat 1/2 gallon of ice cream in a day when she allows herself to have it - feels unconscious of amount at times)                Nutrition Focused Physical Exam:           Energy Needs           Nutrition Diagnosis      Nutrition Diagnosis  Patient has Nutrition Diagnosis: Yes  Diagnosis Status (1): New  Nutrition Diagnosis 1: Disordered eating pattern  Related to (1): DE hx  As Evidenced by (1): restricted eating patterns and thoughts, skipped meals and snacks, significant food rules and guilt    Nutrition Interventions/Recommendations   Nutrition Prescription  Individualized Nutrition Prescription Provided for : MARSHA  Individualized Nutrition Prescription Provided for : MARSHA  Nutrition Education  Nutrition Education Content: Content related nutrition education  Goals: Discussed hx of DE, why addressing DE is important to make changes to her habits for her health. Explored MARSHA in detail - pt agrees that starting with focusing on adequate and consistent meals and snacks is important. Would like to also focus on reminding herself that it's normal to eat all foods - working with therapist on this also. ([facility]) - BI and DE being discussed. Discussed foods she views as \"bad\" - sweets, drinks with sugar, pasta, greasy foods. Discussed diet mentality and all/nothing thinking - how it doesn't help her make food choices that feel good to her. Discussed weight-inclusive vs weight-centric care, how BI work comes into MNT for ED hx. To complete intake form before next session. bi-weekly f/us.  Nutrition Counseling  Nutrition Counseling Strategies : Nutrition " counseling based on motivational interviewing strategy, Nutrition counseling based on goal setting strategy        There are no Patient Instructions on file for this visit.    Nutrition Monitoring and Evaluation   Food and Nutrient Intake  Monitoring and Evaluation Plan: Amount of food, Meal/snack pattern  Knowledge Belief Attitude Determination   Monitoring and Evaluation Plan: Food and nutrition knowledge, Belief and attitude determination, Avoidance behavior, Bingeing and purging behavior  Binge Eating and Purging Behavior: Binge eating behavior, Number of binge eating episodes per week  Biochemical Data, Medical Tests and Procedures  Monitoring and Evaluation Plan: Glucose/endocrine profile          Time Spent  Prep time on day of patient encounter: 10 minutes  Time spent directly with patient, family or caregiver: 55 minutes  Additional Time Spent on Patient Care Activities: 5 minutes  Documentation Time: 10 minutes  Other Time Spent: 0 minutes  Total: 80 minutes

## 2025-02-12 ENCOUNTER — APPOINTMENT (OUTPATIENT)
Dept: PRIMARY CARE | Facility: CLINIC | Age: 45
End: 2025-02-12
Payer: MEDICARE

## 2025-02-17 ENCOUNTER — APPOINTMENT (OUTPATIENT)
Dept: OBSTETRICS AND GYNECOLOGY | Facility: CLINIC | Age: 45
End: 2025-02-17
Payer: MEDICARE

## 2025-02-20 ENCOUNTER — APPOINTMENT (OUTPATIENT)
Dept: PRIMARY CARE | Facility: CLINIC | Age: 45
End: 2025-02-20
Payer: MEDICARE

## 2025-02-20 ENCOUNTER — APPOINTMENT (OUTPATIENT)
Dept: VASCULAR MEDICINE | Facility: CLINIC | Age: 45
End: 2025-02-20
Payer: MEDICARE

## 2025-02-20 DIAGNOSIS — F50.819 BINGE EATING DISORDER, UNSPECIFIED SEVERITY: Primary | ICD-10-CM

## 2025-02-20 PROCEDURE — 97803 MED NUTRITION INDIV SUBSEQ: CPT | Performed by: DIETITIAN, REGISTERED

## 2025-02-20 NOTE — PROGRESS NOTES
"Nutrition Assessment     Reason for Visit:  Tsering Carranza is a 44 y.o. female who presents for Nutrition Counseling (Unspecified ED, insulin resistance/BG mgnt)    Anthropometrics:            Food And Nutrient Intake:  Food and Nutrient History  Food and Nutrient History: Since initial session, pt has been trying to eat more often and more consistently. This have been difficult 2/2 ED food rules, guilt and shame around DM. Feels significant stress when she eats any starch food for fear she is \"killing myself\" because of DM diagnosis. She struggles with taste buds and knowing what she is in the mood to eat (2/2 food rules preventing variety and significant hx of restriction). She is worried about \"over-eating\" this past week. Struggling to \"act as if\" she doesn't believe ED thoughts.  Energy Intake: Fair 50-75 %     Food Intake  Meal 1: [employer] burrito or skips on weekends  Snacks: avoids  Meal 2: soup, frozen meal, orders out, chicken sandwich or tenders  Snacks : avoids  Meal 3: fast food, wings, pizza, pasta, etc  Fluid Intake: water, lemonade (feels bad when she drinks this - worries about BGs)                                                        Factors:                         Physical Activities:              Knowledge Beliefs Attitudes and Behavior       Belief and Attitude Determination  Beliefs and Attitudes: Preoccupation with body shape, Negative nutrition self talk, Unscientific nutrition beliefs, Body image disturbance, Preoccupation with food, Preoccupation with body weight, Negative emotions about food and nutrition         Avoidance Behavior  Avoidance: Food groups  Restrictive Eating: Yes  Cause of Avoidance Behavior: ED hx                     Nutrition Focused Physical Exam:           Energy Needs           Nutrition Diagnosis      Nutrition Diagnosis  Patient has Nutrition Diagnosis: Yes  Diagnosis Status (1): Active  Nutrition Diagnosis 1: Disordered eating pattern  Related to (1): DE " "hx  As Evidenced by (1): restricted eating patterns and thoughts, skipped meals and snacks, significant food rules and guilt    Nutrition Interventions/Recommendations   Nutrition Education  Nutrition Education Content: Content related nutrition education  Goals: Discussed talking to PCP about medication management for her BGs while she works on her relationship with food. If she tries to diet or exercise, it becomes extreme behavior that aligns with eating disorder patterns significantly more than healthy habits and thoughts. She agrees that it is unhealthy for her physical and mental health to engage in this behavior. Discussed why it's so hard to \"act as if\" she doesn't believe her ED thoughts, but why it's important to be able to change her ANTs at some point in her future. Discussed entree and side meal plan framework - examples for today and this week discussed. At next session, discuss meal plan in more detail to provide support for MARSHA KATHLEEN focus right now.  Nutrition Counseling  Nutrition Counseling Strategies : Nutrition counseling based on motivational interviewing strategy  Coordination of Nutrition Care by a Nutrition Professional  Goals: Collaboration of RDN to PCP for ED medication management directions/advice        There are no Patient Instructions on file for this visit.    Nutrition Monitoring and Evaluation   Food and Nutrient Intake  Monitoring and Evaluation Plan: Amount of food, Meal/snack pattern  Knowledge Belief Attitude Determination   Monitoring and Evaluation Plan: Food and nutrition knowledge, Belief and attitude determination, Avoidance behavior, Bingeing and purging behavior  Criteria: no current BE - seems to be more ED subjective \"over-eating\" - continue to assess          Time Spent  Prep time on day of patient encounter: 5 minutes  Time spent directly with patient, family or caregiver: 45 minutes  Additional Time Spent on Patient Care Activities: 0 minutes  Documentation Time: 10 " minutes  Other Time Spent: 0 minutes  Total: 60 minutes

## 2025-02-20 NOTE — Clinical Note
Wilbert Torres - I'm a dietitian working with Tsering on her health, DM and relationship with food. She has a very significant eating disorder history, that is continuing to present right now. Most notably, she restrict her intake and feels intense shame for eating most foods. This is important because she had been trying to make food/exercise changes for her DM, but would take these behaviors to the extreme (<500 kcal/d, exercising 4x/d or more). She and I agreed that this is not healthy for her physically or mentally (and not sustainable). With that being said, she is interested in talking with you re: medication mgnt for DM. She doesn't see Endo until April. If her BG were able to be managed with medications, she believes that she could make some progress in her ED recovery and healthy lifestyle changes also.   Thank you! Meghan

## 2025-03-06 ENCOUNTER — APPOINTMENT (OUTPATIENT)
Dept: PRIMARY CARE | Facility: CLINIC | Age: 45
End: 2025-03-06
Payer: MEDICARE

## 2025-03-06 DIAGNOSIS — E11.9 TYPE 2 DIABETES MELLITUS WITHOUT COMPLICATION, WITHOUT LONG-TERM CURRENT USE OF INSULIN (MULTI): Primary | ICD-10-CM

## 2025-03-06 PROCEDURE — 97803 MED NUTRITION INDIV SUBSEQ: CPT | Performed by: DIETITIAN, REGISTERED

## 2025-03-07 NOTE — PROGRESS NOTES
Nutrition Assessment     Reason for Visit:  Tsering Carranza is a 44 y.o. female who presents for Nutrition Counseling (T2DM)    Anthropometrics:            Food And Nutrient Intake:  Food and Nutrient History  Food and Nutrient History: Pt has been cooking more at home and this is making ED/DE brain feel less anxious and guilty for what she eats. Struggles with emotional eating she feels. Also struggles with restriction thinking, black white food thoughts. Wants to have a middle group approach to food.  Energy Intake: Fair 50-75 %  Food Allergy: Tree nuts, Other (Comment) (GI symp)  Food Intolerance: IBS - onions, also does not like fish or shellfish, will eat tuna occasionally     Food Intake  Meal 1: [employer] burrito or skips on weekends  Snacks: avoids  Meal 2: soup, frozen meal, orders out, chicken sandwich or tenders  Snacks : avoids  Meal 3: fast food, wings, pizza, pasta, etc, homemade meals more                                                        Factors:                         Physical Activities:              Knowledge Beliefs Attitudes and Behavior       Belief and Attitude Determination  Beliefs and Attitudes: Preoccupation with body shape, Negative nutrition self talk, Unscientific nutrition beliefs, Body image disturbance, Preoccupation with food, Preoccupation with body weight, Negative emotions about food and nutrition         Avoidance Behavior  Avoidance: Food groups  Restrictive Eating: Yes  Cause of Avoidance Behavior: ED hx                     Nutrition Focused Physical Exam:           Energy Needs           Nutrition Diagnosis      Nutrition Diagnosis  Patient has Nutrition Diagnosis: Yes  Diagnosis Status (1): Active  Nutrition Diagnosis 1: Disordered eating pattern  Related to (1): DE hx  As Evidenced by (1): restricted eating patterns and thoughts, skipped meals and snacks, significant food rules and guilt    Nutrition Interventions/Recommendations   Nutrition  "Prescription  Individualized Nutrition Prescription Provided for : ED recovery, DM management  Individualized Nutrition Prescription Provided for : ED recovery, DM management  Nutrition Education  Nutrition Education Content: Content related nutrition education  Goals: Education on talking to PCP about medication management for BGS while she works on eating disorder recovery. Discussed why relationship with food impacts her ability to change habit in a sustainable way. Discussed HAES and why emphasizing weight loss can be unhelpful when changing habits, especially with her black/white DE thoughts and behaviors.  Nutrition Counseling  Nutrition Counseling Strategies : Nutrition counseling based on motivational interviewing strategy  Goals: Explored emotional eating, naming importance of having other ways to cope with emotions - talking to therapist about this. Discussed importance of self-compassion in emotional eating - negative emotions continue emotional eating cycle. Discussed working on kindness and allowing herself to cook at home and get fast food - trying to practice a middle ground instead of blakc/white thinking.        There are no Patient Instructions on file for this visit.    Nutrition Monitoring and Evaluation   Food and Nutrient Intake  Monitoring and Evaluation Plan: Amount of food, Meal/snack pattern  Knowledge Belief Attitude Determination   Monitoring and Evaluation Plan: Food and nutrition knowledge, Belief and attitude determination, Avoidance behavior, Bingeing and purging behavior  Binge Eating and Purging Behavior: Binge eating behavior, Number of binge eating episodes per week  Criteria: no current BE - seems to be more ED subjective \"over-eating\" - continue to assess  - emotional eating  Biochemical Data, Medical Tests and Procedures  Monitoring and Evaluation Plan: Glucose/endocrine profile          Time Spent  Prep time on day of patient encounter: 5 minutes  Time spent directly with " patient, family or caregiver: 30 minutes  Additional Time Spent on Patient Care Activities: 0 minutes  Documentation Time: 10 minutes  Other Time Spent: 0 minutes  Total: 45 minutes

## 2025-03-10 DIAGNOSIS — E66.01 SEVERE OBESITY (MULTI): Primary | ICD-10-CM

## 2025-03-10 DIAGNOSIS — E11.9 CONTROLLED TYPE 2 DIABETES MELLITUS WITHOUT COMPLICATION, WITHOUT LONG-TERM CURRENT USE OF INSULIN (MULTI): ICD-10-CM

## 2025-03-17 ENCOUNTER — APPOINTMENT (OUTPATIENT)
Dept: PRIMARY CARE | Facility: CLINIC | Age: 45
End: 2025-03-17
Payer: MEDICARE

## 2025-03-20 ENCOUNTER — APPOINTMENT (OUTPATIENT)
Dept: PRIMARY CARE | Facility: CLINIC | Age: 45
End: 2025-03-20
Payer: MEDICARE

## 2025-03-20 DIAGNOSIS — E11.9 TYPE 2 DIABETES MELLITUS WITHOUT COMPLICATION, WITHOUT LONG-TERM CURRENT USE OF INSULIN (MULTI): Primary | ICD-10-CM

## 2025-03-20 PROCEDURE — 97803 MED NUTRITION INDIV SUBSEQ: CPT | Performed by: DIETITIAN, REGISTERED

## 2025-03-21 NOTE — PROGRESS NOTES
"Nutrition Assessment     Reason for Visit:  Tsering Carranza is a 44 y.o. female who presents for Nutrition Counseling (T2DM, ED (restricting focus, self-reported BE at times))    Anthropometrics:            Food And Nutrient Intake:  Food and Nutrient History  Food and Nutrient History: Continuing to struggle with all/nothing thinking around food: had [employer] this past week - ED brain felt she should only have a sandwich, but 'diet rebel brain' drove \"screw it eating\" of sandwich, 2 sides and drink. Middle ground feels impossible to find. Afraid to allow herself permission to eat all foods because she's worried it will impact health, weight, BGs in a drastic way instantly. Significant mental stress and anxiety around her eating choices. Notices this can impact her BGs more than the food does.  Energy Intake: Fair 50-75 %  Food Allergy: Tree nuts, Other (Comment) (strawberry - itchy)  Food Intolerance: IBS - onions, also does not like fish or shellfish, will eat tuna occasionally  Sleep Duration/Quality: 5-6 hrs disrupted     Food Intake  Meal 1: breakfast sandwich or burrito, or skips on weekend  Snacks: avoids  Meal 2: soup, frozen meal, orders out, chicken sandwich or tenders  Snacks : avoids  Meal 3: fast food, wings, pizza, pasta, etc, homemade meals more  Snacks: avoids - usually wants something sweet, chocolate, ice cream  Food Variety: Absent  Fluid Intake: water, lemonade (feels bad when she drinks this - worries about BGs)                                                        Factors:                         Physical Activities:              Knowledge Beliefs Attitudes and Behavior       Belief and Attitude Determination  Beliefs and Attitudes: Preoccupation with body shape, Negative nutrition self talk, Unscientific nutrition beliefs, Body image disturbance, Preoccupation with food, Preoccupation with body weight, Negative emotions about food and nutrition         Avoidance Behavior  Avoidance: Food " "groups  Restrictive Eating: Yes  Cause of Avoidance Behavior: ED hx    Binge Eating and Purging Behavior  Binge Eating Behavior: Absent  Self-Induced Purging Behavior: Absent                Nutrition Focused Physical Exam:           Energy Needs  Estimated Energy Needs  Total Energy Estimated Needs in 24 hours (kCal): 2000 kCal  Method for Estimating Needs: [id number]        Nutrition Diagnosis      Nutrition Diagnosis  Patient has Nutrition Diagnosis: Yes  Diagnosis Status (1): Active  Nutrition Diagnosis 1: Disordered eating pattern  Related to (1): DE hx  As Evidenced by (1): restricted eating patterns and thoughts, skipped meals and snacks, significant food rules and guilt    Nutrition Interventions/Recommendations   Nutrition Education  Nutrition Education Content: Content related nutrition education  Goals: Discussed adequate eating meal plan: having 3-4 food items at meal and 2-3 foods at snacks. Explored how adequate eating helps anxiety, ED brain, recovery, BGs, over-eating, BE, etc. Discussed restrict-binge cycle and why this happens, Validated experience. Discussed her blk/white thinking and why this is unhelpful to live-long behavior changes. Discussed how her food anxiety impacts BGs more than the food itself most likely. Trying to remind herself of this. Discussed trying unconditional permission with meal plan over the next 2 weeks. Eating 3 meals and 2 snacks daily with food group goals we discussed. Fear is that she will gain weight or make BGS worse - acknowledged this and also explored how doing this could actually help her health, BGs, etc. - reducing BE, reducing the swings of restriction to over-eating \"screw it\" thinking.  Nutrition Counseling  Nutrition Counseling Strategies : Nutrition counseling based on motivational interviewing strategy        There are no Patient Instructions on file for this visit.    Nutrition Monitoring and Evaluation   Food and Nutrient Intake  Monitoring and " "Evaluation Plan: Amount of food, Meal/snack pattern  Knowledge Belief Attitude Determination   Monitoring and Evaluation Plan: Food and nutrition knowledge, Belief and attitude determination, Avoidance behavior, Bingeing and purging behavior  Avoidance behavior: Restrictive eating  Criteria: no current BE - seems to be more ED subjective \"over-eating\" - continue to assess  - emotional eating          Time Spent  Prep time on day of patient encounter: 5 minutes  Time spent directly with patient, family or caregiver: 30 minutes  Additional Time Spent on Patient Care Activities: 0 minutes  Documentation Time: 10 minutes  Other Time Spent: 0 minutes  Total: 45 minutes                          "

## 2025-03-25 ENCOUNTER — APPOINTMENT (OUTPATIENT)
Facility: CLINIC | Age: 45
End: 2025-03-25
Payer: MEDICARE

## 2025-04-03 ENCOUNTER — APPOINTMENT (OUTPATIENT)
Dept: PRIMARY CARE | Facility: CLINIC | Age: 45
End: 2025-04-03
Payer: MEDICARE

## 2025-04-03 DIAGNOSIS — E11.9 TYPE 2 DIABETES MELLITUS WITHOUT COMPLICATION, WITHOUT LONG-TERM CURRENT USE OF INSULIN: Primary | ICD-10-CM

## 2025-04-03 PROCEDURE — 97803 MED NUTRITION INDIV SUBSEQ: CPT | Performed by: DIETITIAN, REGISTERED

## 2025-04-07 NOTE — PROGRESS NOTES
"Nutrition Assessment     Reason for Visit:  Tsering Carranza is a 44 y.o. female who presents for Nutrition Counseling (ED recovery)    Anthropometrics:            Food And Nutrient Intake:  Food and Nutrient History  Food and Nutrient History: Continues to struggle with ED thinking, food guilt. Wants to recover from ED and knows that ED thoughts are not healthy or helpful, but also doesn't feel like she can be okay with her body and is constantly thinking about her body in a negative way most days. Doesn't body check in mirrors or with clothing, but feels constantly aware of her stomach and is persistently dissatisfied with it. Social media is a trigger for this. Remembers feeling better about her body when she was more active, but also knows she was very sick with ED at that time and it wouldn't be a safe or health-promoting choice to re-engage in that behavior again. Needs to establish with new ED therapist. Worried about upcoming \"[facility]\" appointment and this triggering ED thoughts and behaviors.  Energy Intake: Fair 50-75 %  Food Allergy: Tree nuts, Other (Comment)  Food Intolerance: IBS - onions, also does not like fish or shellfish, will eat tuna occasionally  Sleep Duration/Quality: 5-6 hrs disrupted     Food Intake  Meal 1: breakfast sandwich or burrito, or skips on weekend  Snacks: avoids  Meal 2: soup, frozen meal, orders out, chicken sandwich or tenders  Snacks : avoids  Meal 3: fast food, wings, pizza, pasta, etc, homemade meals more  Fluid Intake: water, lemonade (feels bad when she drinks this - worries about BGs)                                                        Factors:                         Physical Activities:              Knowledge Beliefs Attitudes and Behavior       Belief and Attitude Determination  Beliefs and Attitudes: Preoccupation with body shape, Negative nutrition self talk, Unscientific nutrition beliefs, Body image disturbance, Preoccupation with food, Preoccupation with " "body weight, Negative emotions about food and nutrition         Avoidance Behavior  Avoidance: Food groups  Restrictive Eating: Yes  Cause of Avoidance Behavior: ED hx, avoiding less \"forbidden\" foods but feeling very guilty for eating them                     Nutrition Focused Physical Exam:           Energy Needs           Nutrition Diagnosis      Nutrition Diagnosis  Patient has Nutrition Diagnosis: Yes  Diagnosis Status (1): Active  Nutrition Diagnosis 1: Disordered eating pattern  Related to (1): DE hx  As Evidenced by (1): restricted eating patterns and thoughts, skipped meals and snacks, significant food rules and guilt    Nutrition Interventions/Recommendations   Nutrition Counseling  Nutrition Counseling Strategies : Nutrition counseling based on motivational interviewing strategy  Goals: Explored her continued challenges in reframing her ED thoughts. Thoughts feel so \"natural\" that she has a hard time believing that she's allowed to eat all foods and not feel guilty for it. Struggling with BI: discussed that we're trying to work on taking care of her body instead of being at war with it. Discussed how unhelpful diet culture and online world is: working to create more non-diet talk around her and bulding support for this work. Needs to establish with new ED therapist.        There are no Patient Instructions on file for this visit.    Nutrition Monitoring and Evaluation   Food and Nutrient Intake  Monitoring and Evaluation Plan: Amount of food, Meal/snack pattern  Knowledge Belief Attitude Determination   Monitoring and Evaluation Plan: Food and nutrition knowledge, Belief and attitude determination, Avoidance behavior, Bingeing and purging behavior  Criteria: no current BE - seems to be more ED subjective \"over-eating\" - continue to assess  - emotional eating  Biochemical Data, Medical Tests and Procedures  Monitoring and Evaluation Plan: Glucose/endocrine profile          Time Spent  Prep time on day of " patient encounter: 5 minutes  Time spent directly with patient, family or caregiver: 45 minutes  Additional Time Spent on Patient Care Activities: 0 minutes  Documentation Time: 10 minutes  Other Time Spent: 0 minutes  Total: 60 minutes

## 2025-04-08 ENCOUNTER — TELEPHONE (OUTPATIENT)
Dept: ORTHOPEDIC SURGERY | Facility: HOSPITAL | Age: 45
End: 2025-04-08

## 2025-04-08 NOTE — TELEPHONE ENCOUNTER
Copied from CRM #[id number]. Topic: Appointment Scheduled  >> Apr 8, 2025  3:34 PM Rylee BROUSSARD wrote:  Next day add-on

## 2025-04-09 ENCOUNTER — OFFICE VISIT (OUTPATIENT)
Dept: ORTHOPEDIC SURGERY | Facility: HOSPITAL | Age: 45
End: 2025-04-09
Payer: MEDICARE

## 2025-04-09 ENCOUNTER — HOSPITAL ENCOUNTER (OUTPATIENT)
Dept: RADIOLOGY | Facility: HOSPITAL | Age: 45
Discharge: HOME | End: 2025-04-09
Payer: MEDICARE

## 2025-04-09 VITALS — HEIGHT: 63 IN | BODY MASS INDEX: 43.05 KG/M2 | WEIGHT: 243 LBS

## 2025-04-09 DIAGNOSIS — M79.641 HAND PAIN, RIGHT: ICD-10-CM

## 2025-04-09 PROCEDURE — 73130 X-RAY EXAM OF HAND: CPT | Mod: RIGHT SIDE | Performed by: RADIOLOGY

## 2025-04-09 PROCEDURE — 73130 X-RAY EXAM OF HAND: CPT | Mod: RT

## 2025-04-09 PROCEDURE — 99214 OFFICE O/P EST MOD 30 MIN: CPT | Performed by: STUDENT IN AN ORGANIZED HEALTH CARE EDUCATION/TRAINING PROGRAM

## 2025-04-09 ASSESSMENT — PAIN SCALES - GENERAL: PAINLEVEL_OUTOF10: 4

## 2025-04-09 ASSESSMENT — PAIN DESCRIPTION - DESCRIPTORS: DESCRIPTORS: ACHING;SORE

## 2025-04-09 ASSESSMENT — PAIN - FUNCTIONAL ASSESSMENT: PAIN_FUNCTIONAL_ASSESSMENT: 0-10

## 2025-04-09 NOTE — PROGRESS NOTES
CHIEF COMPLAINT: Right wrist injury  DOI: 4/5/2025  DOS: None      HISTORY OF PRESENT ILLNESS    This is a very pleasant 44-year-old female, right-hand-dominant, works at  in accounting, has 2 daughters at home, denies active tobacco use, type 2 diabetes not on insulin, on Lovenox for history of DVT PE, denies prior surgical histories to her upper extremities.  She had a mechanical fall on the above date in her laundry room and while attempting to catch herself smacked her right hand on her dryer pretty violently.  She noted immediate dorsal wrist pain.  This is her first formal evaluation.  Pain has been persistent about the dorsal radial aspect of her wrist.  She denies any component numbness tingling paresthesias.  She has had no directed treatments to date      PHYSICAL EXAM    Extremities / Musculoskeletal:                  Right upper extremity:  No gross deformity no active skin lesions no open wounds.  No erythema or ecchymosis.  Subtle diffuse edema about the dorsal aspect of her wrist and into her hand.  Full active wrist range of motion with extension to 90 degrees with pain at terminus flexion at 90 degrees with pain at the terminus.  9090 pronosupination.  Full composite digital flexion full wrist extension MCP and IP joints.  FDS FDP intact to all fingers.  FPL EPL intact.  Tender palpation about the dorsal wrist.  Mildly tender palpation about the anatomic snuffbox.  Nontender palpation about the radial styloid distal pole the scaphoid and ulnar landmarks.  Negative Gardner shift test. Motor intact to radian/PIN/median/AIN/Ulnar nerve distributions. Sensation intact to light touch in the median/ulnar/radial nerve distributions. 2+ radial pulse at the wrist, hand and all digits are warm and well-perfused with a brisk capillary refill of <2s.       IMAGING / LABS / EMGs:    Right hand x-ray series obtained on 4/9/2025 independently reviewed demonstrating no obvious signs of fracture or dislocation.  No  SL widening.  Well-maintained joint spaces.      Past Medical History:   Diagnosis Date    Allergic rhinitis 09/17/2016    Allergic rhinitis due to dust mite, animal dander, pollen    Calculus of kidney 06/16/2022    Kidney stone on left side    Depression     History of DVT (deep vein thrombosis)     2015 pregnancy    Hypothyroidism 01/31/2014    on Levo    Polycystic ovarian syndrome 06/15/2020    PCOS (polycystic ovarian syndrome)    Type 2 diabetes mellitus     Type 2 diabetes mellitus without complications 08/26/2022    Diet-controlled diabetes mellitus       Medication Documentation Review Audit       Reviewed by Bi Valenzuela LPN (Licensed Nurse) on 04/09/25 at 0839      Medication Order Taking? Sig Documenting Provider Last Dose Status   alcohol swabs pads, medicated [id number]  To test once daily Mayela Torres DO  Active   blood sugar diagnostic (Blood Glucose Test) strip [id number]  To test once daily Mayela Torres DO  Active   cholecalciferol (Vitamin D-3) 50 MCG (2000 UT) tablet [id number]  Take 1 tablet (50 mcg) by mouth once daily. Mayela Torres DO  Active   efinaconazole (Jublia) 10 % solution with applicator [id number]  Apply to affected nails nightly. Miranda Carson MD  Active   enoxaparin (Lovenox) 100 mg/mL syringe [id number]  Inject 1 mL (100 mg) under the skin every 12 hours.   Patient taking differently: Inject 40 mg under the skin 2 times a day.    Thang Vinson MD  Active   EPINEPHrine (EpiPen 2-Wilfrido) 0.3 mg/0.3 mL injection syringe [id number] No Inject into lateral thigh for anaphylaxis. Call 911 after use. May repeat after 5 minutes if not effective. Princess MACKENZIE Hu MD Taking Active   lancets misc [id number]  To test once daily Mayela Torres DO  Active   levothyroxine (Synthroid, Levoxyl) 112 mcg tablet [id number]  Take 1 tablet (112 mcg) by mouth once daily. Mayela Torres DO  Active                    Allergies   Allergen Reactions    Azithromycin Dizziness and Other     Bee Pollen Itching    Nut - Unspecified Itching    Pollen Extracts Itching    Prednisone Other    Strawberry Itching    Doxycycline Hives    Duloxetine Other and Unknown     change in mental heart racing elevated BP    Fluoxetine Hcl Anxiety and Unknown     Panic, high anxiety, fatigue    Grass Pollen Itching and Rash    House Dust Itching and Rash       Past Surgical History:   Procedure Laterality Date    OTHER SURGICAL HISTORY  05/16/2022    Tonsillectomy    OTHER SURGICAL HISTORY  09/14/2022    Cholecystectomy laparoscopic    OTHER SURGICAL HISTORY  11/20/2020    Lithotripsy         ASSESSMENT:   Right wrist injury, likely dorsal capsular sprain, possible occult scaphoid injury.    Based on her examination and negative radiographs on my read I would recommend immobilization with a removable brace and repeat x-rays in approximately 4 weeks.  While this is hopefully a simple wrist sprain I want to protect her wrist and make sure that we are not missing any scaphoid injury.    We discussed about the possibility of obtaining an MRI if she is persistently painful or her radiographs are concerning at the next visit.    PLAN:   Patient fitted for removable cock-up wrist brace on the right side  That should be worn at all times that showering  5 pound weightbearing restriction    Follow-up in: 4 weeks  XR at next visit: Yes, right wrist x-ray series    The diagnosis and treatment plan were reviewed with the patient. All questions were answered. The patient verbalized understanding of the treatment plan. There were no barriers to understanding identified.     Note dictated with [email or web address] software.  Completed without full type editing and sent to avoid delay.    Dirk Medina M.D.    Department of Orthopaedics  Hand/Upper Extremity  Phone: [phone]  Appt. Phone: [phone]\

## 2025-04-10 ENCOUNTER — APPOINTMENT (OUTPATIENT)
Dept: OPHTHALMOLOGY | Facility: CLINIC | Age: 45
End: 2025-04-10
Payer: MEDICARE

## 2025-04-10 DIAGNOSIS — H52.13 MYOPIA OF BOTH EYES: Primary | ICD-10-CM

## 2025-04-10 DIAGNOSIS — H16.223 BILATERAL KERATOCONJUNCTIVITIS SICCA: ICD-10-CM

## 2025-04-10 DIAGNOSIS — H35.721 MACULAR PIGMENT EPITHELIAL DETACHMENT, RIGHT: ICD-10-CM

## 2025-04-10 DIAGNOSIS — H02.88A MEIBOMIAN GLAND DYSFUNCTION RIGHT EYE, UPPER AND LOWER EYELIDS: ICD-10-CM

## 2025-04-10 DIAGNOSIS — H02.88B MEIBOMIAN GLAND DYSFUNCTION LEFT EYE, UPPER AND LOWER EYELIDS: ICD-10-CM

## 2025-04-10 DIAGNOSIS — H43.393 VITREOUS FLOATERS OF BOTH EYES: ICD-10-CM

## 2025-04-10 ASSESSMENT — REFRACTION_CURRENTRX
OS_DIAMETER: 14.2
OS_BRAND: INFUSE
OS_SPHERE: -4.50
OD_SPHERE: -4.75
OS_DIAMETER: 14.2
OS_BASECURVE: 8.3
OS_DIAMETER: 14.2
OS_BRAND: MYDAY
OS_SPHERE: -4.50
OD_ADDL_SPECS: TRIAL 2
OD_BRAND: MYDAY
OD_DIAMETER: 14.2
OD_BASECURVE: 8.3
OD_SPHERE: -4.75
OS_DIAMETER: 14.2
OD_BRAND: INFUSE
OS_BASECURVE: 8.7
OD_ADDL_SPECS: TRIAL 1
OS_BASECURVE: 8.4
OD_BASECURVE: 8.4
OD_DIAMETER: 14.2
OD_DIAMETER: 14.2
OS_SPHERE: -4.75
OD_SPHERE: -4.75
OD_BASECURVE: 8.6
OD_BASECURVE: 8.7
OS_BASECURVE: 8.6
OD_BRAND: PRECISION 1
OD_ADDL_SPECS: TRIAL 3
OS_BRAND: PROCLEAR 1 DAY
OS_SPHERE: -4.50
OD_DIAMETER: 14.2
OD_BRAND: PROCLEAR 1 DAY
OD_SPHERE: -4.75
OS_BRAND: PRECISION 1

## 2025-04-10 ASSESSMENT — CONF VISUAL FIELD
OS_NORMAL: 1
OS_INFERIOR_NASAL_RESTRICTION: 0
OS_SUPERIOR_TEMPORAL_RESTRICTION: 0
OS_SUPERIOR_NASAL_RESTRICTION: 0
OD_SUPERIOR_NASAL_RESTRICTION: 0
OD_NORMAL: 1
METHOD: COUNTING FINGERS
OD_INFERIOR_NASAL_RESTRICTION: 0
OS_INFERIOR_TEMPORAL_RESTRICTION: 0
OD_SUPERIOR_TEMPORAL_RESTRICTION: 0
OD_INFERIOR_TEMPORAL_RESTRICTION: 0

## 2025-04-10 ASSESSMENT — ENCOUNTER SYMPTOMS
CONSTITUTIONAL NEGATIVE: 0
PSYCHIATRIC NEGATIVE: 0
EYES NEGATIVE: 0
RESPIRATORY NEGATIVE: 0
ALLERGIC/IMMUNOLOGIC NEGATIVE: 0
CARDIOVASCULAR NEGATIVE: 0
NEUROLOGICAL NEGATIVE: 0
HEMATOLOGIC/LYMPHATIC NEGATIVE: 0
GASTROINTESTINAL NEGATIVE: 0
MUSCULOSKELETAL NEGATIVE: 0
ENDOCRINE NEGATIVE: 0

## 2025-04-10 ASSESSMENT — REFRACTION_WEARINGRX
OS_SPHERE: -5.00
OD_SPHERE: -5.00
OS_CYLINDER: SPHERE
OD_CYLINDER: SPHERE

## 2025-04-10 ASSESSMENT — VISUAL ACUITY
METHOD: SNELLEN - LINEAR
CORRECTION_TYPE: GLASSES
OD_CC: 20/20
VA_OR_OS_CURRENT_RX: 20/20
OS_CC: 20/20
VA_OR_OD_CURRENT_RX: 20/20

## 2025-04-10 ASSESSMENT — REFRACTION
OS_CYLINDER: SPHERE
OD_CYLINDER: SPHERE
OS_SPHERE: -4.50
OD_SPHERE: -5.00

## 2025-04-10 ASSESSMENT — REFRACTION_MANIFEST
OD_CYLINDER: SPHERE
OD_SPHERE: -5.00
OS_CYLINDER: SPHERE
OS_SPHERE: -4.75

## 2025-04-10 ASSESSMENT — EXTERNAL EXAM - RIGHT EYE: OD_EXAM: NORMAL

## 2025-04-10 ASSESSMENT — CUP TO DISC RATIO
OS_RATIO: 0.4
OD_RATIO: 0.4

## 2025-04-10 ASSESSMENT — TONOMETRY
OS_IOP_MMHG: 17
IOP_METHOD: GOLDMANN APPLANATION
OD_IOP_MMHG: 18

## 2025-04-10 ASSESSMENT — SLIT LAMP EXAM - LIDS
COMMENTS: NORMAL
COMMENTS: NORMAL

## 2025-04-10 ASSESSMENT — EXTERNAL EXAM - LEFT EYE: OS_EXAM: NORMAL

## 2025-04-10 NOTE — PROGRESS NOTES
Assessment/Plan   Diagnoses and all orders for this visit:  Myopia of both eyes  New spec rx released today per patient request. Ocular health wnl for age OU. Monitor 1 year or sooner prn. Refraction billed today. Pt consents to receiving glasses Rx today. Patient's/guardian's signature obtained to acknowledge and confirm that a paper copy of glasses Rx was given to patient in compliance with [facility] Eyeglass Rule. Electronic copy of Rx will also be available via [email or web address].   Discussed proper wear, care, replacement of contact lenses. Gave handout. D/c cl wear and RTC if eyes become red, painful, irritated. Monitor 1 year.   CL eval billed today. $35  Trialing sihy lenses. Ok to finalize option if patient likes   (MyDay Energys, Infuse, Precision 1).     Vitreous floaters of both eyes  Discussed signs and symtpoms of retinal hole, tear, detachment. Patient educated that retinal detachment can lead to permanent vision loss. Patient consents to return if they notice new floaters, flashes, curtain or veil covering vision.    Macular pigment epithelial detachment, right  -     OCT, Retina - OU - Both Eyes  Discussed. Patient reports that she does have high anxiety. But no recent steroid use. Monitor at home w/ Amsler grid. RTC 3 months for Dfe and OCT macula. Sooner with change on grid.     Meibomian gland dysfunction right eye, upper and lower eyelids  Meibomian gland dysfunction left eye, upper and lower eyelids  Bilateral keratoconjunctivitis sicca  Pt educated on exam findings. Start level 1 tx of ATs qid OU, gel/silviano lubricants qhs OU, warm compresses for 8-10min with lid massage, and lid hygeine. Discussed that dry eye is a chronic, multi-factorial problem that does require chronic, consistent treatment. RTC prn for follow up with Tear Osmolarity (or Inflammadry), Schirmers B test, OSDI.  Discussed that any CL may make existing dry eye worse.

## 2025-04-11 ENCOUNTER — OFFICE VISIT (OUTPATIENT)
Facility: HOSPITAL | Age: 45
End: 2025-04-11
Payer: MEDICARE

## 2025-04-11 ENCOUNTER — OFFICE VISIT (OUTPATIENT)
Dept: PRIMARY CARE | Facility: HOSPITAL | Age: 45
End: 2025-04-11
Payer: MEDICARE

## 2025-04-11 VITALS
SYSTOLIC BLOOD PRESSURE: 145 MMHG | HEIGHT: 63 IN | BODY MASS INDEX: 42.82 KG/M2 | OXYGEN SATURATION: 100 % | DIASTOLIC BLOOD PRESSURE: 97 MMHG | TEMPERATURE: 97.5 F | WEIGHT: 241.7 LBS | HEART RATE: 87 BPM

## 2025-04-11 DIAGNOSIS — I10 HYPERTENSION, UNSPECIFIED TYPE: ICD-10-CM

## 2025-04-11 DIAGNOSIS — E28.2 POLYCYSTIC OVARY SYNDROME: ICD-10-CM

## 2025-04-11 DIAGNOSIS — F41.9 ANXIETY AND DEPRESSION: ICD-10-CM

## 2025-04-11 DIAGNOSIS — F50.21 MILD BULIMIA NERVOSA: ICD-10-CM

## 2025-04-11 DIAGNOSIS — E66.813 CLASS 3 OBESITY: Primary | ICD-10-CM

## 2025-04-11 DIAGNOSIS — E66.01 SEVERE OBESITY (MULTI): ICD-10-CM

## 2025-04-11 DIAGNOSIS — F32.A ANXIETY AND DEPRESSION: ICD-10-CM

## 2025-04-11 PROCEDURE — 99214 OFFICE O/P EST MOD 30 MIN: CPT | Performed by: FAMILY MEDICINE

## 2025-04-11 PROCEDURE — 3080F DIAST BP >= 90 MM HG: CPT | Performed by: FAMILY MEDICINE

## 2025-04-11 PROCEDURE — 1036F TOBACCO NON-USER: CPT | Performed by: PSYCHOLOGIST

## 2025-04-11 PROCEDURE — 3008F BODY MASS INDEX DOCD: CPT | Performed by: FAMILY MEDICINE

## 2025-04-11 PROCEDURE — 90791 PSYCH DIAGNOSTIC EVALUATION: CPT | Performed by: PSYCHOLOGIST

## 2025-04-11 PROCEDURE — 3077F SYST BP >= 140 MM HG: CPT | Performed by: FAMILY MEDICINE

## 2025-04-11 RX ORDER — SEMAGLUTIDE 0.25 MG/.5ML
0.25 INJECTION, SOLUTION SUBCUTANEOUS WEEKLY
Qty: 2 ML | Refills: 0 | Status: SHIPPED | OUTPATIENT
Start: 2025-04-11 | End: 2025-05-11

## 2025-04-11 NOTE — PROGRESS NOTES
"Time started: 845  Time ended: 930  Total time spent: 45 minutes  Visit type: in person       Disclaimer: We discussed that the note will be visible and others healthcare practitioners will have access. The patient  consented to an unrestricted note due to working with a multidisciplinary team.     Marcio Ashley: Health Psychology, New Patient Visit  Referral: Dr. Chuck Bhatti  Chief Complaint: Psychiatric Evaluation (Marcio Ashley, new patient)       Brief Background: Tsering is a 44 year-old ,  female. She was born and raised in [place] by her biological parents. She has 2 children (20 and 9). She has 4, ½ siblings and 1 full siblings. She lives in a home with her kids and feels safe  Employment: She is employed at  in finance.    Education: Master's in Clinical Counseling      Weight history:  The patient started having problems with excess weight when She was 11 years old. She started having more problems with her weight after she had her second child, 9 years-ago.  She lost 60 pounds 2 years ago and regained it.   Stress eating led to the weight regain. She stated that she rushes around a lot with her kids.   Highest adult weight: 247 lbs  Lowest adult weight: 120 lbs   Current weight: 241 lbs   Height: 5'3\"  Diets tried: \"everything, weight watchers, counting calories, Keto. I feel like I have disordered eating, eating a lot or restricted.\"   Patient had the most success with a very restrictive diet, losing 60 pounds.   Factors that led to weight gain or weight regain include: stress eating and restrictive and binge eating    Motivation for starting this program and overall goals    \"I think I'm just frustrated. I 'm sick of my weight going up and down.\" She is tired of the restricting and binging.     Stress and Coping:   Psychosocial stressors: being overwhelmed as a single mom.   Coping mechanisms include: eating mostly  Coping skills were rated as somewhat effective.    Behavioral and/or eating " disorders contributing to excess weight:   The patient eats more than intended or planned in response to: Anxiety and Stress  Food weaknesses include: sweets and anything sweet.      Disordered eating History:   The patient suspects she has an eating disorder. She is not sure of the exact diagnosis.   If yes, then has the patient received treatment? yes. She started counseling a month ago for an eating disorder.   The counselor left [facility] but she continues to meet with a nutritionist.   Where and when was treatment? [facility].     Current eating disorder assessment:  Compensatory behaviors: The patient reported taking excessive laxatives, excessive driven exercise, taking OTC or prescribed weight loss medications excessively, and trying starvation    Last time she restricted extremely was 2 years ago.   Binge Eating Disorder symptoms: Loss of Control over eating, Eating an excessive amount of food over one to two hour timeframe, Eating when not physically hungry, Eating until excessively full, Eating alone due to being embarrassed by how much they are eating, Being distressed about how much they are eating, and Frequency 2x/month  She eats rapidly when she binges.   She restricts for longer periods of time until she loses a significant amount of weight and then binges.   She has symptoms of Bulimia Nervosa but does not meet full criteria at this time.     Last episode: past week or two. Onset: middle school.   She is trying to lose weight so she restricts.   She uses the overeating to cope with anxiety.       Graze Eating Habits: The patient reported DCgrazeeating: does not meet criteria for problematic graze eating habits  The patient meets criteria for an Eating disorder, NOS.     Exercises once per week.     Medical Conditions of concern: PCOS   Medications that have increased weight gain: none to her knowledge      Mental health history:     During today's evaluation, the patient deniedsuicidal  "ideation, plan, and/or attempt.    She described her mood as tired today.   Currently, the patient is being treated for depression and anxiety via counseling.   Dr. Bhatti is prescribing Wegovy for the food noise.     Insomnia: Yes, she has problems staying asleep.   Hospitalizations for mental health: Denied  Suicide attempts: Denied but thoughts in high school.   Self-injurious behaviors: Denied      Substance, tobacco, and alcohol use history:    History of alcohol dependence: Denied  Substance dependence: Denied    Dependence on prescription medications: Denied  Tobacco dependence: Denied  Treatment programs for addiction: N/A    Please see AUDIT for alcohol use habits over the past year.   Current Tobacco use habits include:    Tobacco Use: Low Risk  (4/11/2025)    Patient History     Smoking Tobacco Use: Never     Smokeless Tobacco Use: Never     Passive Exposure: Never        Current Cannabis use: denied     Use of any other illicit substances: denied    FITTER ME GOALS:     The most challenging: \"definitely the breakfast and the 30-minutes of exercise.\"   The last meal of the day is 4-5 pm.   The least challenging: beverage goal. She has lemonade rarely but mostly has water. And the evening goal.     Barriers to changing health behaviors: not a morning person. Habit of restricting and overeating and she does not like to cook.   She is confused about what to eat.     Initial goals to get started: the breakfast goal.     Mental Status Evaluation  General Appearance: well groomed, appropriate eye contact  Attitude/Behavior: cooperative  Motor: no psychomotor agitation or retardation, no tremor or other abnormal movements  Speech: normal rate, volume, prosody  Gait/Station: WFL  Mood:  described as fatigued, no SI or plan/denied when asked.       Affect: euthymic, full-range  Thought Process: linear, goal directed  Thought Associations: no loosening of associations  Thought Content: normal  Perception: no " perceptual abnormalities noted  Sensorium: alert and oriented to person, place, time and situation  Insight: fair  Judgment: fair  Cognition: cognitively intact to conversational testing with respect to attention, orientation, fund of knowledge, recent and remote memory, and language       Summary:   Tsering Carranza   is a  44 y.o. year-old, ,  female who presents today with a history of obesity with comorbid medical conditions and difficulty with weight loss. The purpose of this evaluation was to conduct a health behavior evaluation to determine the patient's overall health goals.       Judith Coleman, PhD

## 2025-04-11 NOTE — PROGRESS NOTES
Tsering Carranza is  a 44 y.o. female here for initial evaluation for treatment of obesity.    Past Medical History:   Diagnosis Date    Allergic rhinitis 09/17/2016    Allergic rhinitis due to dust mite, animal dander, pollen    Calculus of kidney 06/16/2022    Kidney stone on left side    Corneal ulcer     Depression     Disease of thyroid gland     Dry eyes     Eating disorder     GERD (gastroesophageal reflux disease)     History of DVT (deep vein thrombosis)     2015 pregnancy    Hypothyroidism 01/31/2014    on Levo    Migraine     Polycystic ovarian syndrome 06/15/2020    PCOS (polycystic ovarian syndrome)    Type 2 diabetes mellitus     Type 2 diabetes mellitus without complications 08/26/2022    Diet-controlled diabetes mellitus    Varicella        Tobacco Use: Low Risk  (4/11/2025)    Patient History     Smoking Tobacco Use: Never     Smokeless Tobacco Use: Never     Passive Exposure: Never     Alcohol Use: Not At Risk (9/5/2024)    AUDIT-C     Frequency of Alcohol Consumption: Never     Average Number of Drinks: Patient does not drink     Frequency of Binge Drinking: Never     Tsering has a history of recurrent DVT and one episode of PE. She also has a history of pre-diabetes, plus a HbA1C% of 6.7in August 2024. She has a questionable history of PCOS, but apparently has had normal labs in the past.   She has had a cholecystectomy in 2023, and surgery for nephrolithiasis in the past. She doesn't smoke or drink alcohol.    Both her parents are in their eighties and in excellent health. She has one 40-year sister who is healthy. She has four half siblings as well. She has two children, ages 20 and 9, both of whom are healthy, and with whom she lives.  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 She wakes up at 5:30AM. Her eating pattern is consistently inconsistent - She has breakfast around 7AM, and then eats early afternoon. Both of these meals are typically fast food. She has dinner with  her children around 4PM. This is often a take out meal as well. She doesn't eat late at night, and drinks only water. She is largely sedentary.    Tsering is motivated to lose weight to finally address the problem. She has gained 50lbs since last year, and this has been a pattern throughout her life. She loses and regains weight frequently. She hasn't tried to lose weight recently, but just by being conscientious, she has lost weight in the past. Prior to having her children, she weighed just 135lbs.      On physical examination   Vitals:    04/11/25 0818   BP: (!) 145/97   Pulse: 87   Temp: 36.4 °C (97.5 °F)   SpO2: 100%     BP is high today. Pulmonary exam is normal. Cardiovascular exam reveals a grade II/VI systolic murmur best heard along LSB in second interspace.     Body mass index is 42.82 kg/m².      Overall Impression: Pleasant, quiet woman who needs more consistency in her eating habits and regular activity.     Goals included our Standard Fitter me goals with implementation counseling by Dr. Judith Coleman. HbA1C%, fasting lipid panel,  Followup in 4 months.     No food or drink after 7PM, Drink only water at all times., Have a protein-rich breakfast within 30 minutes of waking up., and Thirty minutes of continuous physical activity 7 days a week.

## 2025-04-17 ENCOUNTER — APPOINTMENT (OUTPATIENT)
Dept: PRIMARY CARE | Facility: CLINIC | Age: 45
End: 2025-04-17
Payer: MEDICARE

## 2025-04-17 ENCOUNTER — APPOINTMENT (OUTPATIENT)
Facility: HOSPITAL | Age: 45
End: 2025-04-17
Payer: MEDICARE

## 2025-04-17 DIAGNOSIS — E11.9 TYPE 2 DIABETES MELLITUS WITHOUT COMPLICATION, WITHOUT LONG-TERM CURRENT USE OF INSULIN: Primary | ICD-10-CM

## 2025-04-17 PROCEDURE — 97803 MED NUTRITION INDIV SUBSEQ: CPT | Performed by: DIETITIAN, REGISTERED

## 2025-04-17 NOTE — PROGRESS NOTES
"Nutrition Assessment     Reason for Visit:  Tsering Carranza is a 44 y.o. female who presents for Nutrition Counseling (ED recovery, general healthful eating/habit changes, T2DM)    Anthropometrics:            Food And Nutrient Intake:  Food and Nutrient History  Food and Nutrient History: Pt reflected on how recent recommendation to finish eating by 7pm or 2 hours before bedtime triggered all/nothing extreme ED thoughts. Has not been acting on these thoughts and was able to name then as disordered. Worried about drinking more sugary drinks and this impacting BGs. Tries to fast for longer at night to lower morning BGs but this doesn't seem to help. Trying to eat breakfast more often and be more consistent with eating patterns.  Energy Intake: Fair 50-75 %, Good > 75 %     Food Intake  Meal 1: breakfast sandwich or burrito, or skips on weekend  Snacks: avoids  Meal 2: soup, frozen meal, orders out, chicken sandwich or tenders  Snacks : avoids  Meal 3: fast food, wings, pizza, pasta, etc, homemade meals more  Snacks: avoids - usually wants something sweet, chocolate, ice cream                                                        Factors:                         Physical Activities:              Knowledge Beliefs Attitudes and Behavior       Belief and Attitude Determination  Beliefs and Attitudes: Preoccupation with body shape, Negative nutrition self talk, Unscientific nutrition beliefs, Body image disturbance, Preoccupation with food, Preoccupation with body weight, Negative emotions about food and nutrition         Avoidance Behavior  Avoidance: Food groups  Restrictive Eating: Yes  Cause of Avoidance Behavior: ED hx, avoiding less \"forbidden\" foods but feeling very guilty for eating them    Binge Eating and Purging Behavior  Binge Eating Behavior: Absent  Self-Induced Purging Behavior: Absent                Nutrition Focused Physical Exam:           Energy Needs           Nutrition Diagnosis      Nutrition " "Diagnosis  Patient has Nutrition Diagnosis: Yes  Diagnosis Status (1): Active  Nutrition Diagnosis 1: Disordered eating pattern  Related to (1): DE hx  As Evidenced by (1): restricted eating patterns and thoughts, skipped meals and snacks, significant food rules and guilt    Nutrition Interventions/Recommendations   Nutrition Education  Nutrition Education Content: Content related nutrition education  Goals: Education on eating an evening snack to help reduce glycogenolysis from liver overnight, exploring the Talita Phenonomon and why this happens. Explored how not sleeping well will impacting morning BGs. Discussed ideas for breakfast and nighttime snack - pairing carb and non carb foods in a balanced and enjoyable way. Discussed talking to PCP next week about her BG management, medication for this so that she doesn't feel as much stress and pressure to \"eat perfectly.\"  Nutrition Counseling  Nutrition Counseling Strategies : Nutrition counseling based on motivational interviewing strategy  Goals: Discussed eating breakfast by 8:30 am at the latest- aiming to eat around 7am, about 1 hour or so after waking up. Discussed 12 hour \"shift\" of metabolism at night. Discussed her ED reaction to making \"habits\" into rules. Explored reframing her thoughts about pairing carbs with non carbs in an empowered way - instead of \"I'm afraid to eat this banana because of my BGs,\" \"I'm eating this banana with a hard boiled egg and peanut butter to feel full, satisfied, and know that I'm supporting my blood sugars.\" Discussed pairing sugary drinks in this same way. Discussed that avoiding a food or drink that she enjoys is not usually sustainable, and usually driven by ED, restricting contributing to binge eating/over-eating/emotional eating.        There are no Patient Instructions on file for this visit.    Nutrition Monitoring and Evaluation   Food and Nutrient Intake  Monitoring and Evaluation Plan: Amount of food, Meal/snack " "pattern  Knowledge Belief Attitude Determination   Monitoring and Evaluation Plan: Food and nutrition knowledge, Belief and attitude determination, Avoidance behavior, Bingeing and purging behavior  Avoidance behavior: Restrictive eating  Binge Eating and Purging Behavior: Binge eating behavior, Number of binge eating episodes per week  Criteria: no current BE - seems to be more ED subjective \"over-eating\" - continue to assess  - emotional eating  Biochemical Data, Medical Tests and Procedures  Monitoring and Evaluation Plan: Glucose/endocrine profile          Time Spent  Prep time on day of patient encounter: 5 minutes  Time spent directly with patient, family or caregiver: 30 minutes  Additional Time Spent on Patient Care Activities: 0 minutes  Documentation Time: 10 minutes  Other Time Spent: 0 minutes  Total: 45 minutes                          "

## 2025-04-20 NOTE — PROGRESS NOTES
Urogynecology  Provider:  Monserrat Wolf MD  [phone]              ASSESSMENT AND PLAN:     Problem List Items Addressed This Visit    None          I spent a total of {eConsult Time:47868} in face to face and non face to face time.        Monserrat Wolf MD        HISTORY OF PRESENT ILLNESS:     Last visit 4/2024  43 y.o. old female being assessed for uterine pain      Diagnoses:   #1 Uterine pain      Plan:   Uterine pain/Adenomyosis  - Advised the best treatment would be a hysterectomy, pt not open to this option at this time  - We will continue to monitor  - All questions answered      Follow-up in 6 months with Dr. Wolf      Record Review:     Prolapse Symptoms :     Urinary Symptoms:     Bowel Symptoms:     Sexual Activity:          Past Medical History:      Medical History[1]       Past Surgical History:       Surgical History[2]      Medications:       Prior to Admission medications    Medication Sig Start Date End Date Taking? Authorizing Provider   alcohol swabs pads, medicated To test once daily 8/8/24   Mayela Torres DO   blood sugar diagnostic (Blood Glucose Test) strip To test once daily 8/29/24   Mayela Torres DO   cholecalciferol (Vitamin D-3) 50 MCG (2000 UT) tablet Take 1 tablet (50 mcg) by mouth once daily. 9/5/24   Mayela Torres DO   efinaconazole (Jublia) 10 % solution with applicator Apply to affected nails nightly. 4/16/24   Miranda Carson MD   enoxaparin (Lovenox) 100 mg/mL syringe Inject 1 mL (100 mg) under the skin every 12 hours.  Patient taking differently: Inject 40 mg under the skin 2 times a day. 10/24/24 4/22/25  Thang Vinson MD   EPINEPHrine (EpiPen 2-Wilfrido) 0.3 mg/0.3 mL injection syringe Inject into lateral thigh for anaphylaxis. Call 911 after use. May repeat after 5 minutes if not effective. 4/11/24   Princess MACKENZIE Hu MD   lancets misc To test once daily 8/8/24   Mayela Torres DO   levothyroxine (Synthroid, Levoxyl) 112 mcg tablet Take 1  tablet (112 mcg) by mouth once daily. 24   Mayela Torres,    semaglutide, weight loss, (Wegovy) 0.25 mg/0.5 mL pen injector Inject 0.25 mg under the skin 1 (one) time per week. 25  Chuck Bhatti MD        ROS  Review of Systems     Blood, Urine   Date Value Ref Range Status   2023 SMALL (1+) (A) NEGATIVE Final     Nitrite, Urine   Date Value Ref Range Status   2023 NEGATIVE NEGATIVE Final     Urobilinogen, Urine   Date Value Ref Range Status   2023 <2.0 <2.0 mg/dL Final         PHYSICAL EXAM:      There were no vitals taken for this visit.     No LMP recorded.      Declines chaperone for physical exam.      Well developed, well nourished, in no apparent distress.   Neurologic/Psychiatric:  Awake, Alert and Oriented times 3.  Affect normal.     GENITAL/URINARY:       External Genitalia:  The patient has normal appearing external genitalia, normal skenes and bartholins glands, and a normal hair distribution.  Her vulva is without lesions, erythema or discharge.  It is non-tender with appropriate sensation.     Urethral Meatus:  Size normal, Location normal, Lesions absent, Prolapse absent,      Urethra:  Fullness absent, Masses absent,      Bladder:  Fullness absent, Masses absent, Tenderness absent,      Vagina:  General appearance normal, Estrogen effect normal, Discharge absent, Lesions absent, ***     Cervix: Normal, no discharge.   Uterus:  {UTERUS, EXAM:13499}  Adnexa: {Adnexa:59581}     Anus/Perineum:  Lesions absent and Masses absent {Exam; anus:25551}  {Exam; anus and perineum:08705}    Stress urinary incontinence *** demonstrable.         POP-Q:  Stage: {NUMBERS 0-4:06323}  Position: {SITTING STANDIN}    Aa: ***       Ba: *** C: ***   Gh: *** Pb: *** TVL: ***         Ap: *** Bp: *** D: ***               Data and DIAGNOSTIC STUDIES REVIEWED   Imaging  No results found.    Cardiology, Vascular, and Other Imaging  No other imaging results found for the past 7  days     Lab Results   Component Value Date    URINECULTURE No significant growth 11/12/2024      Lab Results   Component Value Date    GLUCOSE 164 (H) 08/31/2024    CALCIUM 9.2 08/31/2024     08/31/2024    K 3.7 08/31/2024    CO2 26 08/31/2024     08/31/2024    BUN 11 08/31/2024    CREATININE 0.67 08/31/2024     Lab Results   Component Value Date    WBC 8.4 09/13/2024    HGB 13.3 09/13/2024    HCT 41.2 09/13/2024    MCV 89 09/13/2024     09/13/2024          Monserrat Wolf MD               [1]  Past Medical History:  Diagnosis Date   • Allergic rhinitis 09/17/2016    Allergic rhinitis due to dust mite, animal dander, pollen   • Calculus of kidney 06/16/2022    Kidney stone on left side   • Corneal ulcer    • Depression    • Disease of thyroid gland    • Dry eyes    • Eating disorder    • GERD (gastroesophageal reflux disease)    • History of DVT (deep vein thrombosis)     2015 pregnancy   • Hypothyroidism 01/31/2014    on Levo   • Migraine    • Polycystic ovarian syndrome 06/15/2020    PCOS (polycystic ovarian syndrome)   • Type 2 diabetes mellitus    • Type 2 diabetes mellitus without complications 08/26/2022    Diet-controlled diabetes mellitus   • Varicella    [2]  Past Surgical History:  Procedure Laterality Date   • OTHER SURGICAL HISTORY  05/16/2022    Tonsillectomy   • OTHER SURGICAL HISTORY  09/14/2022    Cholecystectomy laparoscopic   • OTHER SURGICAL HISTORY  11/20/2020    Lithotripsy

## 2025-04-21 ENCOUNTER — APPOINTMENT (OUTPATIENT)
Facility: CLINIC | Age: 45
End: 2025-04-21
Payer: MEDICARE

## 2025-04-21 ENCOUNTER — APPOINTMENT (OUTPATIENT)
Dept: PULMONOLOGY | Facility: CLINIC | Age: 45
End: 2025-04-21
Payer: MEDICARE

## 2025-04-21 ENCOUNTER — APPOINTMENT (OUTPATIENT)
Dept: OBSTETRICS AND GYNECOLOGY | Facility: CLINIC | Age: 45
End: 2025-04-21
Payer: MEDICARE

## 2025-04-21 DIAGNOSIS — R10.2 PELVIC PAIN: Primary | ICD-10-CM

## 2025-04-22 ENCOUNTER — APPOINTMENT (OUTPATIENT)
Dept: ENDOCRINOLOGY | Facility: CLINIC | Age: 45
End: 2025-04-22
Payer: MEDICARE

## 2025-04-22 VITALS
BODY MASS INDEX: 42.62 KG/M2 | WEIGHT: 240.6 LBS | HEART RATE: 70 BPM | TEMPERATURE: 97.9 F | DIASTOLIC BLOOD PRESSURE: 87 MMHG | SYSTOLIC BLOOD PRESSURE: 162 MMHG

## 2025-04-22 DIAGNOSIS — E11.65 TYPE 2 DIABETES MELLITUS WITH HYPERGLYCEMIA, WITHOUT LONG-TERM CURRENT USE OF INSULIN: Primary | ICD-10-CM

## 2025-04-22 DIAGNOSIS — E03.9 HYPOTHYROIDISM, UNSPECIFIED TYPE: ICD-10-CM

## 2025-04-22 DIAGNOSIS — E11.9 TYPE 2 DIABETES MELLITUS WITHOUT COMPLICATION, WITHOUT LONG-TERM CURRENT USE OF INSULIN: ICD-10-CM

## 2025-04-22 DIAGNOSIS — E55.9 VITAMIN D DEFICIENCY: ICD-10-CM

## 2025-04-22 PROCEDURE — 3079F DIAST BP 80-89 MM HG: CPT | Performed by: STUDENT IN AN ORGANIZED HEALTH CARE EDUCATION/TRAINING PROGRAM

## 2025-04-22 PROCEDURE — 99212 OFFICE O/P EST SF 10 MIN: CPT | Performed by: STUDENT IN AN ORGANIZED HEALTH CARE EDUCATION/TRAINING PROGRAM

## 2025-04-22 PROCEDURE — 3077F SYST BP >= 140 MM HG: CPT | Performed by: STUDENT IN AN ORGANIZED HEALTH CARE EDUCATION/TRAINING PROGRAM

## 2025-04-22 PROCEDURE — 1036F TOBACCO NON-USER: CPT | Performed by: STUDENT IN AN ORGANIZED HEALTH CARE EDUCATION/TRAINING PROGRAM

## 2025-04-22 RX ORDER — IBUPROFEN 200 MG
CAPSULE ORAL
Qty: 100 STRIP | Refills: 1 | Status: SHIPPED | OUTPATIENT
Start: 2025-04-22

## 2025-04-22 RX ORDER — LEVOTHYROXINE SODIUM 112 UG/1
112 TABLET ORAL DAILY
Qty: 90 TABLET | Refills: 1 | Status: SHIPPED | OUTPATIENT
Start: 2025-04-22

## 2025-04-22 NOTE — PATIENT INSTRUCTIONS
Carb controlled diet:   45g Carb per meal   15g Carb per snack    Blood sugar goals:  Fasting <140   Later in the day/2 hours after eating <160      Can start trulicity 0.75mg weekly     Follow up next available     Jamee Dietz MD  Divison of Endocrinology   [facility]   Phone: [phone]    option 4, then option 1  Fax: [phone]

## 2025-04-22 NOTE — PROGRESS NOTES
"44 F PMH: HTN, DM2, DVT, binge eating, IBS    Coming in today for DM and thyroid    1)Hypothyroidism  Diagnosed at least 10 years ago, based on labs  Currently on LT4 112mcg daily    2)  Diabetes History     DM diagnosed sometime in 2022 then lost significant amount of weight so had reversal of DM soemetime 2023   GYN-no DGM 2004, 2014    Weight regain over the last year, also noticed sugars starting to go up again   Complications Micro and Macro-none known   A1c:   Lab Results   Component Value Date    HGBA1C 6.7 (H) 08/12/2024       Regimen   None     Previously:  Metformin-very briefly     SMBG   Fingerstick   Fasting 200s , later in the day 100s   Hypoglycemia none known     Diet: has binge eating/restricting problem  Seeing nutritionist     Physical activity-walking, works in [employer], sedentary at job    Comorbidities and Screening  Eye Exam: needs  Foot exam: needs    Lipid  Lab Results   Component Value Date    CHOL 172 08/12/2024    CHOL 200 (H) 07/03/2023    CHOL 173 08/26/2022     Lab Results   Component Value Date    HDL 48.4 08/12/2024    HDL 57.7 07/03/2023    HDL 51.9 08/26/2022     Lab Results   Component Value Date    LDLCALC 93 08/12/2024     Lab Results   Component Value Date    TRIG 154 (H) 08/12/2024    TRIG 89 07/03/2023    TRIG 144 08/26/2022     No components found for: \"CHOLHDL\"      Statin- none  Cr and albuminuria- no CKD   Lab Results   Component Value Date    CREATININE 0.67 08/31/2024    EGFR >90 08/31/2024      ACE/ARB- none     Medical History[1]  Family History[2]   Social History     Socioeconomic History    Marital status:      Spouse name: Not on file    Number of children: Not on file    Years of education: Not on file    Highest education level: Not on file   Occupational History    Not on file   Tobacco Use    Smoking status: Never     Passive exposure: Never    Smokeless tobacco: Never   Substance and Sexual Activity    Alcohol use: Never    Drug use: Never    Sexual " activity: Not Currently     Partners: Male     Birth control/protection: Abstinence   Other Topics Concern    Not on file   Social History Narrative    Not on file     Social Drivers of Health     Financial Resource Strain: Not on file   Food Insecurity: Not on file   Transportation Needs: Not on file   Physical Activity: Not on file   Stress: Stress Concern Present (8/23/2023)    [employer] [place] [employer] - Occupational Stress Questionnaire     Feeling of Stress : Rather much   Social Connections: Not on file   Intimate Partner Violence: Not on file   Housing Stability: Not on file     Famhx- mother DM    ROS:  Negative except those noted in current and interim history    Physical Exam  Constitutional:       Appearance: Normal appearance.   Neck:      Comments: No nodules palpated  Cardiovascular:      Rate and Rhythm: Normal rate and regular rhythm.   Skin:     General: Skin is warm.      Comments: Bruising on the abdomen   Neurological:      Mental Status: She is alert.          labs and imaging reviewed, pertinent findings listed on HPI and Impression      Problem List Items Addressed This Visit       Hypothyroidism    Relevant Medications    levothyroxine (Synthroid, Levoxyl) 112 mcg tablet    Other Relevant Orders    Thyroid Stimulating Hormone    Thyroxine, Free    Vitamin D deficiency    Relevant Orders    Vitamin D 25-Hydroxy,Total (for eval of Vitamin D levels)    Type 2 diabetes mellitus with hyperglycemia, without long-term current use of insulin - Primary    Relevant Medications    dulaglutide (Trulicity) 0.75 mg/0.5 mL pen injector     Other Visit Diagnoses         Type 2 diabetes mellitus without complication, without long-term current use of insulin        Relevant Medications    blood sugar diagnostic (Blood Glucose Test)    Other Relevant Orders    Referral to Clinical Pharmacy          1) thyroid  Continue LT4 112mcg daily   Repeat TFTs now    2) DM2  Discussed reversal with at at  least 5% of weight loss   Start trulicity 0.75mg weekly   Currently seeing nutritionist  Need to do DM screening next visit  Labs    Follow up next available             [1]   Past Medical History:  Diagnosis Date    Allergic rhinitis 09/17/2016    Allergic rhinitis due to dust mite, animal dander, pollen    Calculus of kidney 06/16/2022    Kidney stone on left side    Corneal ulcer     Depression     Disease of thyroid gland     Dry eyes     Eating disorder     GERD (gastroesophageal reflux disease)     History of DVT (deep vein thrombosis)     2015 pregnancy    Hypothyroidism 01/31/2014    on Levo    Migraine     Polycystic ovarian syndrome 06/15/2020    PCOS (polycystic ovarian syndrome)    Type 2 diabetes mellitus     Type 2 diabetes mellitus without complications 08/26/2022    Diet-controlled diabetes mellitus    Varicella    [2]   Family History  Problem Relation Name Age of Onset    Cholelithiasis Mother Diane     Hyperlipidemia Mother Diane     Other (PREDIABETES) Mother Diane     Thyroid disease Mother Diane     Depression Father Quirino     Hypertension Father Quirino     Cataracts Father Quirino     Hearing loss Father Quirino     Asthma Sister Liset     Hypertension Sister Liset     Alcohol abuse Sister Liset     Mental illness Sister Liset     Depression Sister Liset     Asthma Daughter Caroline     Depression Daughter Caroline     Other (CARDIAC DISORDER) Other GRANDFATHER     Heart disease Paternal Grandfather Edward     Heart disease Father's Sister Jenna

## 2025-04-24 ENCOUNTER — APPOINTMENT (OUTPATIENT)
Facility: HOSPITAL | Age: 45
End: 2025-04-24
Payer: MEDICARE

## 2025-04-28 ENCOUNTER — APPOINTMENT (OUTPATIENT)
Dept: OPHTHALMOLOGY | Facility: CLINIC | Age: 45
End: 2025-04-28
Payer: MEDICARE

## 2025-04-28 ENCOUNTER — APPOINTMENT (OUTPATIENT)
Dept: RADIOLOGY | Facility: CLINIC | Age: 45
End: 2025-04-28
Payer: MEDICARE

## 2025-04-29 ENCOUNTER — APPOINTMENT (OUTPATIENT)
Dept: RADIOLOGY | Facility: CLINIC | Age: 45
End: 2025-04-29
Payer: MEDICARE

## 2025-04-30 ENCOUNTER — APPOINTMENT (OUTPATIENT)
Dept: OTOLARYNGOLOGY | Facility: CLINIC | Age: 45
End: 2025-04-30
Payer: MEDICARE

## 2025-04-30 VITALS — BODY MASS INDEX: 41.64 KG/M2 | HEIGHT: 63 IN | WEIGHT: 235 LBS

## 2025-04-30 DIAGNOSIS — J34.89 NASAL OBSTRUCTION: Primary | ICD-10-CM

## 2025-04-30 DIAGNOSIS — J31.0 CHRONIC RHINITIS: ICD-10-CM

## 2025-04-30 DIAGNOSIS — J34.89 CONCHA BULLOSA: ICD-10-CM

## 2025-04-30 DIAGNOSIS — R04.0 EPISTAXIS: ICD-10-CM

## 2025-04-30 DIAGNOSIS — J34.89 NASAL AND SINUS DISCHARGE: ICD-10-CM

## 2025-04-30 PROCEDURE — 99204 OFFICE O/P NEW MOD 45 MIN: CPT | Performed by: NURSE PRACTITIONER

## 2025-04-30 PROCEDURE — 31231 NASAL ENDOSCOPY DX: CPT | Performed by: NURSE PRACTITIONER

## 2025-04-30 PROCEDURE — 1036F TOBACCO NON-USER: CPT | Performed by: NURSE PRACTITIONER

## 2025-04-30 PROCEDURE — 3008F BODY MASS INDEX DOCD: CPT | Performed by: NURSE PRACTITIONER

## 2025-04-30 RX ORDER — MUPIROCIN 20 MG/G
OINTMENT TOPICAL
Qty: 30 G | Refills: 0 | Status: SHIPPED | OUTPATIENT
Start: 2025-04-30

## 2025-04-30 ASSESSMENT — PATIENT HEALTH QUESTIONNAIRE - PHQ9
SUM OF ALL RESPONSES TO PHQ9 QUESTIONS 1 AND 2: 0
1. LITTLE INTEREST OR PLEASURE IN DOING THINGS: NOT AT ALL
2. FEELING DOWN, DEPRESSED OR HOPELESS: NOT AT ALL

## 2025-04-30 NOTE — PROGRESS NOTES
Subjective   Patient ID: Tsering Carranza is a 44 y.o. female who presents for No chief complaint on file..  HPI  Tsering Carranza is a 44 y.o. old female   presenting with complaints of sinus and nose problems that began several years ago. The patient describes the sinus/nose problems as gradual onset of nasal obstruction (bilateral), not significant at baseline but increased when sick or with allergies.  Patient denies facial pressure, facial pain, periorbital edema, throat clearing, hoarseness, loss of taste, and loss of smell. The patient reports epistaxis over the last 3 weeks from the left side. She notes that episodes last about 15 minutes and stop with pinching her nose. She is on lovenox daily for blood clots. The patient has not taken medications to alleviate the problem. The patient has tried nasal saline irrigations. Does have a history of allergic rhinitis or allergies. They deny a history of aspirin sensitivity. They report 3 sinus infections per year requiring antibiotic treatment. They deny recent antibiotic usage. Most recent antibiotic usage includes: Amoxicillin x 7 days in January.    History of prior nasal/sinus surgery or procedure: Denies    I personally reviewed the patients CT scan images and results. I discussed the results personally with the patient. The following findings were discussed: 9/3/24: CT Head: Left nasal septal deviation. Middle turbinate matt bullosa. Paranasal sinuses are clear.     Review of Systems  Review of systems is negative for constitutional, ophthalmological, cardiac, pulmonary, renal, gastrointestinal, musculoskeletal, mental health, endocrine, or neurologic disorders (except as listed in the HPI, PMH, and Problem List).     Objective   Physical Exam  CONSTITUTIONAL: Vital signs reviewed. Patient appears well developed and well nourished.   GENERAL: this is a healthy appearing female who appears stated age. The patient is alert and appropriately verbally  conversant without hoarseness. This patient is in no apparent distress.   FACE: The face was inspected and no cutaneous masses or lesions were visualized. There was no erythema or edema noted. Facial movement was symmetric. No skin lesions were detected. There was no sinus tenderness elicited. TMJ crepitus absent.   EYES: Extra-ocular muscle function was intact. No nystagmus was observed. Pupils were equal.   CRANIAL NERVES: Cranial nerves II, III, IV, and VI were noted to be intact via extra-ocular muscle movement testing. Cranial nerve VII noted to be intact and symmetric by facial movement. Cranial nerves IX and X noted to be intact by gag reflex and palatal movement. Cranial nerve XII noted to be intact by active and symmetric tongue movement.   NOSE: Examination of the nose revealed the nasal dorsum to be midline. Intranasal exam reveals the septum is deviated left. The inferior turbinates were hypertrophic. No masses, polyps, mucopus, or other lesion on anterior rhinoscopy. See below procedure note as applicable for further exam.  ORAL CAVITY: Examination of the oral cavity revealed no mass lesions nor infection. The palate was noted to be intact. The tongue exhibited normal mobility. Mucosa was moist without lesion. The lips were free of lesion. Gums were free of inflammation. Dentition: normal without obvious infection or inflammation  OROPHARYNX: The oral pharynx was free of mass lesion or mucosal abnormality. The palate was noted to be without lesion. The uvula was normal appearing. The tonsils were Normal.  EARS: Examination of the ears revealed that the auricles were normally formed with no lesions. The external auditory canals were normal. The tympanic membranes were intact.  There is no inflammation visualized.   NECK: Visualization and palpation of the neck revealed no mass lesions. No skin lesions or inflammatory processes were detected. The cervical musculature was normal to palpation.   CERVICAL  LYMPHATICS: There were no palpable lymph nodes in the posterior triangle, submandibular triangle, jugulodigastric region, or central neck.  RESPIRATORY: Normal inspiration and expiration and chest wall expansion, no use of accessory muscles to breathe, no stridor.  NEUROLOGICAL: Patient is ambulatory without assist. Mentation is clear. Answering questions appropriately.     Nasal / sinus endoscopy    Date/Time: 4/30/2025 10:12 AM    Performed by: NATALIE Ray  Authorized by: NATALIE Ray    Consent:     Consent obtained:  Verbal    Consent given by:  Patient    Risks discussed:  Bleeding, infection and pain    Alternatives discussed:  No treatment, observation and delayed treatment  Procedure details:     Indications: assessment of airway and sino-nasal symptoms      Medication:  Afrin and Ricardo-Synephrine 2%    Instrument: flexible fiberoptic nasal endoscope      Scope location: bilateral nare    Post-procedure details:     Patient tolerance of procedure:  Tolerated well, no immediate complications  Comments:      Findings: After topical decongestion with decongestant and anesthetic spray, nasal endoscopy was performed using an endoscope. The septum was deviated left. The inferior turbinates were hypertrophic. There are several scabs and mucous from the inferior turbinate to the septum bilaterally. The middle turbinates appeared healthy but enlarged and edematous, the middle meatus is free of purulence, masses, lesions or polyps. The superior meatus and sphenoethmoid recess are clear bilaterally. The nasal passageway is patent. The nasopharynx was clear. There were no complications and the patient tolerated the procedure well.        Assessment/Plan     ASSESSMENT:   Tsering Carranza is a 44 y.o. year old female with epistaxis localized to several scabs and crusts from the inferior turbinates to septum bilaterally. Symptoms worsened by chronic rhinitis, likely allergic and use of  lovenox daily.     PLAN:   1. Nasal endoscopy: scabs from inferior turbinate to septum bilaterally.  2.  I personally reviewed the patients CT scan images and results. I discussed the results personally with the patient. The following findings were discussed: 9/3/24: CT Head: Left nasal septal deviation. Middle turbinate matt bullosa. Paranasal sinuses are clear.   3. I recommended the patient use a humidifier in the bedroom and in the house  4. Patient advised to use nasal saline gel at least 2 times per day, by aiming toward the septum, going forward after next follow up. She may alter this with mupirocin ointment, which was prescribed today. She was instructed to place on the pad of the thumb and swipe into the nostril.  5. Discussed epistaxis prevention and acute treatment - Afrin spray PRN for bleeding, apply pressure for 10 minutes if bleeding occurs.  6. Patient advised to follow-up in 4-5 weeks or sooner if needed to assess for benefit of current therapy. We discussed that if there is improvement to her nasal tissue and epistaxis, would introduce fluticasone to aid in her nasal breathing. Patient agrees with established plan of care and all questions were answered.

## 2025-04-30 NOTE — PATIENT INSTRUCTIONS
Today you were evaluated by Mariana Sheikh CNP.    Please follow-up in 4-5 weeks or sooner if needed. If you have any questions or concerns, please contact my office at [phone].     - Begin Mupirocin ointment 3 times daily FOR 2-4 WEEKS as directed. Use the pads of your fingers to apply the ointment and then sniff back gently. Do not use a cue tip or finger nail to place the ointment as this can cause further trauma. IF THE PRESCRIBED OINTMENT IS TOO EXPENSIVE THEN JUST USE OVER THE COUNTER BACITRACIN OR TRIPLE ANTIBIOTIC OINTMENT.    Start using Ayr saline gel at least 3 times per day. This is an over the counter medication. The easiest place to obtain this is online at [employer]. If you obtain this in a spray bottle, please spray by lifting the outside of the nostril and spray directly at the center of your nose. After spraying, gently sniff back and pinch your nose. If you get this in a tube, please place a dime size amount on the pad of your thumb, swipe into the nostril, sniff back, then pinch your nose. Do NOT use a Q-tip or fingertip for application. If you are using medicated nasal sprays (flonase, azelastine, etc.), please apply AFTER application.     NOSE CARE and NOSEBLEED PREVENTION  - Do not stick anything in your nose other than the medicine as noted below. DO NOT pick your nose as this will cause the bleeding  - Avoid any nose blowing, sneeze with your mouth open, avoid any maneuvers that increase the blood pressure in your head (such as straining on the toilet) and keep your head above your heart as much as possible.  - We recommended the patient use a humidifier in the bedroom and in the house.  - Start using nasal saline gel (Ayr nasal gel) at least 3 times per day. Alternatively, you can also use Vasaline three times daily. You can also use a saline nasal spray (Ocean nasal spray) 4-6 times daily. These are all over the counter medications  - We discussed nosebleed prevention and acute  treatment - Afrin or oxymetazoline spray, 2 sprays in each nostril for bleeding, apply pressure for 10 minutes across the soft part of the nose (pinch your nostrils together). If bleeding occurs reapply for another 10 minutes. If this doesn't work then call our office or go to the Emergency Department.

## 2025-05-01 ENCOUNTER — APPOINTMENT (OUTPATIENT)
Dept: PRIMARY CARE | Facility: CLINIC | Age: 45
End: 2025-05-01
Payer: MEDICARE

## 2025-05-01 ENCOUNTER — APPOINTMENT (OUTPATIENT)
Facility: HOSPITAL | Age: 45
End: 2025-05-01
Payer: MEDICARE

## 2025-05-06 ENCOUNTER — APPOINTMENT (OUTPATIENT)
Dept: RADIOLOGY | Facility: CLINIC | Age: 45
End: 2025-05-06
Payer: MEDICARE

## 2025-05-06 DIAGNOSIS — Z12.31 SCREENING MAMMOGRAM FOR BREAST CANCER: ICD-10-CM

## 2025-05-06 PROCEDURE — 77067 SCR MAMMO BI INCL CAD: CPT | Performed by: RADIOLOGY

## 2025-05-06 PROCEDURE — 77063 BREAST TOMOSYNTHESIS BI: CPT | Performed by: RADIOLOGY

## 2025-05-06 PROCEDURE — 77063 BREAST TOMOSYNTHESIS BI: CPT

## 2025-05-07 ENCOUNTER — APPOINTMENT (OUTPATIENT)
Dept: ORTHOPEDIC SURGERY | Facility: HOSPITAL | Age: 45
End: 2025-05-07
Payer: MEDICARE

## 2025-05-07 ENCOUNTER — HOSPITAL ENCOUNTER (OUTPATIENT)
Dept: RADIOLOGY | Facility: HOSPITAL | Age: 45
Discharge: HOME | End: 2025-05-07
Payer: MEDICARE

## 2025-05-07 VITALS — BODY MASS INDEX: 41.64 KG/M2 | WEIGHT: 235 LBS | HEIGHT: 63 IN

## 2025-05-07 DIAGNOSIS — M79.641 HAND PAIN, RIGHT: ICD-10-CM

## 2025-05-07 PROCEDURE — 99214 OFFICE O/P EST MOD 30 MIN: CPT | Performed by: STUDENT IN AN ORGANIZED HEALTH CARE EDUCATION/TRAINING PROGRAM

## 2025-05-07 PROCEDURE — 99213 OFFICE O/P EST LOW 20 MIN: CPT | Performed by: STUDENT IN AN ORGANIZED HEALTH CARE EDUCATION/TRAINING PROGRAM

## 2025-05-07 PROCEDURE — 3008F BODY MASS INDEX DOCD: CPT | Performed by: STUDENT IN AN ORGANIZED HEALTH CARE EDUCATION/TRAINING PROGRAM

## 2025-05-07 PROCEDURE — 73110 X-RAY EXAM OF WRIST: CPT | Mod: RT

## 2025-05-07 PROCEDURE — 1036F TOBACCO NON-USER: CPT | Performed by: STUDENT IN AN ORGANIZED HEALTH CARE EDUCATION/TRAINING PROGRAM

## 2025-05-07 ASSESSMENT — PAIN - FUNCTIONAL ASSESSMENT: PAIN_FUNCTIONAL_ASSESSMENT: 0-10

## 2025-05-07 ASSESSMENT — PAIN DESCRIPTION - DESCRIPTORS: DESCRIPTORS: ACHING;SORE;TIGHTNESS

## 2025-05-07 ASSESSMENT — PAIN SCALES - GENERAL: PAINLEVEL_OUTOF10: 5 - MODERATE PAIN

## 2025-05-07 NOTE — PROGRESS NOTES
CHIEF COMPLAINT: Right wrist injury  DOI: 4/5/2025  DOS: None      HISTORY OF PRESENT ILLNESS    This is a very pleasant 44-year-old female, right-hand-dominant, works at  in accounting, has 2 daughters at home, denies active tobacco use, type 2 diabetes not on insulin, on Lovenox for history of DVT PE, denies prior surgical histories to her upper extremities.  She had a mechanical fall on the above date in her laundry room and while attempting to catch herself smacked her right hand on her dryer pretty violently.  She noted immediate dorsal wrist pain.  This is her first formal evaluation.  Pain has been persistent about the dorsal radial aspect of her wrist.  She denies any component numbness tingling paresthesias.  She has had no directed treatments to date    Interval update 5/7/2025:  Patient return for scheduled follow-up visit.  She reports to be doing okay.  She has been compliant with removable cock up wrist brace.  Her pain is improving but she still has dorsal pain she pain about the wrist.  No pain along the radial aspects of the thumb ray.      PHYSICAL EXAM    Extremities / Musculoskeletal:                  Right upper extremity:  No gross deformity no active skin lesions no open wounds.  No erythema or ecchymosis.  Edema improved full active wrist range of motion with extension to 90 degrees with pain at terminus flexion at 90 degrees with pain at the terminus.  9090 pronosupination.  Full composite digital flexion full wrist extension MCP and IP joints.  FDS FDP intact to all fingers.  FPL EPL intact.  Tender palpation about the dorsal wrist.  Nontender palpation about the anatomic snuffbox nontender palpation about the radial styloid distal pole the scaphoid and ulnar landmarks.  Negative Gardner shift test. Motor intact to radian/PIN/median/AIN/Ulnar nerve distributions. Sensation intact to light touch in the median/ulnar/radial nerve distributions. 2+ radial pulse at the wrist, hand and all  digits are warm and well-perfused with a brisk capillary refill of <2s.       IMAGING / LABS / EMGs:    Right hand x-ray series obtained on 4/9/2025 independently reviewed demonstrating no obvious signs of fracture or dislocation.  No SL widening.  Well-maintained joint spaces.    Right wrist x-ray series performed on 5/7/2025 independently reviewed demonstrating stable global alignment.  No obvious fracture or dislocation.  No significant degenerative changes.  No visible scaphoid fracture.    ASSESSMENT:   Right wrist injury, likely dorsal capsular sprain, possible occult scaphoid injury.    Based on her examination and negative radiographs on my read I would recommend immobilization with a removable brace and repeat x-rays in approximately 4 weeks.  While this is hopefully a simple wrist sprain I want to protect her wrist and make sure that we are not missing any scaphoid injury.    We discussed about the possibility of obtaining an MRI if she is persistently painful or her radiographs are concerning at the next visit.    Interval update 5/7/2025:  Interval clinical and radiographic assessment reassuring that this is likely just a dorsal capsular sprain.  I would recommend weaning out of the removable wrist brace over the next week and then a slow return to desired activities over the following few weeks.  We discussed that this can cause nagging dorsal wrist pain for a few months.    PLAN:   Begin weaning out of brace  Slow return to desired activities  Daily icing    Follow-up in: 6wks  XR at next visit: Only if painful, room first    The diagnosis and treatment plan were reviewed with the patient. All questions were answered. The patient verbalized understanding of the treatment plan. There were no barriers to understanding identified.     Note dictated with Zimory software.  Completed without full type editing and sent to avoid delay.    Dirk Medina M.D.  Assistant    Department of Orthopaedics  Hand/Upper Extremity  Phone: [phone]  Appt. Phone: [phone]\

## 2025-05-08 ENCOUNTER — APPOINTMENT (OUTPATIENT)
Facility: HOSPITAL | Age: 45
End: 2025-05-08
Payer: MEDICARE

## 2025-05-15 ENCOUNTER — APPOINTMENT (OUTPATIENT)
Dept: PRIMARY CARE | Facility: CLINIC | Age: 45
End: 2025-05-15
Payer: MEDICARE

## 2025-05-16 ENCOUNTER — APPOINTMENT (OUTPATIENT)
Dept: RADIOLOGY | Facility: CLINIC | Age: 45
End: 2025-05-16
Payer: MEDICARE

## 2025-05-16 DIAGNOSIS — Z12.31 SCREENING MAMMOGRAM FOR BREAST CANCER: ICD-10-CM

## 2025-05-20 ENCOUNTER — OFFICE VISIT (OUTPATIENT)
Dept: CARDIOLOGY | Facility: CLINIC | Age: 45
End: 2025-05-20
Payer: MEDICARE

## 2025-05-20 VITALS
WEIGHT: 236.9 LBS | HEART RATE: 86 BPM | HEIGHT: 63 IN | DIASTOLIC BLOOD PRESSURE: 80 MMHG | OXYGEN SATURATION: 96 % | SYSTOLIC BLOOD PRESSURE: 132 MMHG | BODY MASS INDEX: 41.98 KG/M2

## 2025-05-20 DIAGNOSIS — Z51.81 ANTICOAGULATION MANAGEMENT ENCOUNTER: Primary | ICD-10-CM

## 2025-05-20 DIAGNOSIS — Z86.718 HISTORY OF RECURRENT DEEP VEIN THROMBOSIS: ICD-10-CM

## 2025-05-20 DIAGNOSIS — I87.009 POST-THROMBOTIC SYNDROME: ICD-10-CM

## 2025-05-20 DIAGNOSIS — I82.402 DEEP VEIN THROMBOSIS (DVT) OF LEFT LOWER EXTREMITY, UNSPECIFIED CHRONICITY, UNSPECIFIED VEIN: ICD-10-CM

## 2025-05-20 DIAGNOSIS — Z79.01 ANTICOAGULATION MANAGEMENT ENCOUNTER: Primary | ICD-10-CM

## 2025-05-20 PROCEDURE — 99212 OFFICE O/P EST SF 10 MIN: CPT

## 2025-05-20 PROCEDURE — 3008F BODY MASS INDEX DOCD: CPT | Performed by: INTERNAL MEDICINE

## 2025-05-20 PROCEDURE — 1036F TOBACCO NON-USER: CPT | Performed by: INTERNAL MEDICINE

## 2025-05-20 PROCEDURE — 3077F SYST BP >= 140 MM HG: CPT | Performed by: INTERNAL MEDICINE

## 2025-05-20 PROCEDURE — 3079F DIAST BP 80-89 MM HG: CPT | Performed by: INTERNAL MEDICINE

## 2025-05-20 PROCEDURE — 3075F SYST BP GE 130 - 139MM HG: CPT | Performed by: INTERNAL MEDICINE

## 2025-05-20 PROCEDURE — 99204 OFFICE O/P NEW MOD 45 MIN: CPT | Performed by: INTERNAL MEDICINE

## 2025-05-20 PROCEDURE — 3080F DIAST BP >= 90 MM HG: CPT | Performed by: INTERNAL MEDICINE

## 2025-05-20 RX ORDER — ENOXAPARIN SODIUM 100 MG/ML
100 INJECTION SUBCUTANEOUS 2 TIMES DAILY
COMMUNITY
Start: 2025-04-28

## 2025-05-20 ASSESSMENT — COLUMBIA-SUICIDE SEVERITY RATING SCALE - C-SSRS
1. IN THE PAST MONTH, HAVE YOU WISHED YOU WERE DEAD OR WISHED YOU COULD GO TO SLEEP AND NOT WAKE UP?: NO
6. HAVE YOU EVER DONE ANYTHING, STARTED TO DO ANYTHING, OR PREPARED TO DO ANYTHING TO END YOUR LIFE?: NO
2. HAVE YOU ACTUALLY HAD ANY THOUGHTS OF KILLING YOURSELF?: NO

## 2025-05-20 ASSESSMENT — PAIN SCALES - GENERAL: PAINLEVEL_OUTOF10: 0-NO PAIN

## 2025-05-20 ASSESSMENT — ENCOUNTER SYMPTOMS
LOSS OF SENSATION IN FEET: 0
DEPRESSION: 1
OCCASIONAL FEELINGS OF UNSTEADINESS: 0

## 2025-05-20 NOTE — PROGRESS NOTES
Chief complaint: VTE -PTS -lymphedema    Subjective   Patient is here to establish care, she has PMHx of HTN, HLD, DM, obesity, hypothyroidism, PCOS, IBS, renal stones, hepatic steatosis     VTE related history  _ 2015, RLE DVT during pregnancy; enoxaparin through whole pregnancy   _ 7/27/2022 Acute PE. Extensive LLE proximal DVT  In setting of cholecsytectomy 7/8/2022. Seen by Dr. Jones. Was on Lovenox x 3 months   Could not tolerate Eliquis, had dizziness and felt different  _ 8/2024 Acute FV-Pop-distal DVT (US done at [facility], images are not available to review).  Patient reports PE as well.  Unprovoked  Was started on Lovenox, seen by Dr. Thang Vinson, she tried Xarelto but she had brain fog and dizziness.  Lovenox was resumed  _ Hypercoagulable work up 2022   APLS, FVL, PT gene, SPEP, protein C/S/antithrombin Ag-Neg   No family history of VTE.  Mom was adopted, so family history is not entirely clear    She continues to be on Lovenox twice daily, tolerated well  Following with her PCP for up-to-date screening, reports negative Pap and mammogram  She is not using compression stockings  She does not exercise  Has a desk job, sitting most of the day  Body mass index is 41.96 kg/m².    Review of Systems  As noted above   Chronic bilateral intermittent leg swelling, left > right  IBS  Anxiety/depression   RLS   Migraine   Self-limited nosebleed, otherwise no bleeding  No shortness of breath or chest pain  No lightheadedness or dizziness  No palpitations  No other complaints today     Past Medical History:   Diagnosis Date    Acne 1996    Allergic     Allergic rhinitis 09/17/2016    Allergic rhinitis due to dust mite, animal dander, pollen    Anemia     Ankle sprain 1994    Anxiety     Calculus of kidney 06/16/2022    Kidney stone on left side    Corneal ulcer     Deep vein thrombosis (Multi) 2014    Depression     Disease of thyroid gland     Dry eyes     Eating disorder     Eczema 1992    Fibroid     Food intolerance      GERD (gastroesophageal reflux disease)     Heart murmur 1982    History of DVT (deep vein thrombosis)     2015 pregnancy    Hyperlipidemia 2021    Hypertension     Hypothyroidism 01/31/2014    on Levo    Migraine     Neck strain     Pneumonia November 2023    Polycystic ovarian syndrome 06/15/2020    PCOS (polycystic ovarian syndrome)    Pulmonary embolism     Radius and ulna distal fracture 1991    Skin tag     Tennis elbow     Type 2 diabetes mellitus     Type 2 diabetes mellitus without complications 08/26/2022    Diet-controlled diabetes mellitus    Urinary tract infection     Urticaria 2023    Varicella     Vitamin D deficiency      Past Surgical History:   Procedure Laterality Date    CHOLECYSTECTOMY      KIDNEY STONE SURGERY      LITHOTRIPSY      OTHER SURGICAL HISTORY  05/16/2022    Tonsillectomy    OTHER SURGICAL HISTORY  09/14/2022    Cholecystectomy laparoscopic    OTHER SURGICAL HISTORY  11/20/2020    Lithotripsy    WISDOM TOOTH EXTRACTION       Social History     Socioeconomic History    Marital status:    Tobacco Use    Smoking status: Never     Passive exposure: Never    Smokeless tobacco: Never   Substance and Sexual Activity    Alcohol use: Never    Drug use: Never    Sexual activity: Not Currently     Partners: Male     Birth control/protection: Abstinence     Social Drivers of Health     Stress: Stress Concern Present (8/23/2023)    [employer] [place] [employer] - Occupational Stress Questionnaire     Feeling of Stress : Rather much      Family History   Problem Relation Name Age of Onset    Cholelithiasis Mother Diane     Hyperlipidemia Mother Diane     Other (PREDIABETES) Mother Diane     Thyroid disease Mother Diane     Depression Father Quirino     Hypertension Father Quirino     Cataracts Father Quirino     Hearing loss Father Quirino     Asthma Sister Liset     Hypertension Sister Liset     Alcohol abuse Sister Liset     Mental illness Sister Liset     Depression Sister  "Liset     Allergic rhinitis Sister Liset     Asthma Daughter Caroilne     Depression Daughter Caroline     Other (CARDIAC DISORDER) Other GRANDFATHER     Heart disease Paternal Grandfather laquita     Heart failure Paternal Grandfather laquita     Heart disease Father's Sister Jenna     Heart disease Father's Sister ronal       Allergies   Allergen Reactions    Azithromycin Dizziness and Other    Bee Pollen Itching    Nut - Unspecified Itching    Pollen Extracts Itching    Prednisone Other    Strawberry Itching    Doxycycline Hives    Duloxetine Other and Unknown     change in mental heart racing elevated BP    Fluoxetine Hcl Anxiety and Unknown     Panic, high anxiety, fatigue    Grass Pollen Itching and Rash    House Dust Itching and Rash       Objective   Physical Exam  BP (!) 141/91 (BP Location: Left arm, Patient Position: Sitting, BP Cuff Size: Large adult)   Pulse 86   Ht 1.6 m (5' 3\")   Wt 107 kg (236 lb 14.4 oz)   BMI 41.96 kg/m²      General:  In no acute distress, overweight  Neuro: alert and oriented x3  CV:  RRR, positive murmur  Lungs: CTA bilaterally  Abd:  Soft, non-tender   Psych:  Appropriate affect  Upper extremities: No swelling, +2 radial   Lower extremities: Bilateral trace edema, left> right-chronic.  +2 DP  Skin:  No open ulcers or chronic venous stasis changes   Very early lymphedema changes    Medications   Current Outpatient Medications   Medication Instructions    alcohol swabs pads, medicated To test once daily    blood sugar diagnostic (Blood Glucose Test) To test once daily    cholecalciferol (VITAMIN D-3) 50 mcg, oral, Daily    dulaglutide (TRULICITY) 0.75 mg, subcutaneous, Weekly    efinaconazole (Jublia) 10 % solution with applicator Apply to affected nails nightly.    enoxaparin (LOVENOX) 100 mg, 2 times daily    EPINEPHrine (EpiPen 2-Wilfrido) 0.3 mg/0.3 mL injection syringe Inject into lateral thigh for anaphylaxis. Call 911 after use. May repeat after 5 minutes if not effective.    " lancets misc To test once daily    levothyroxine (SYNTHROID, LEVOXYL) 112 mcg, oral, Daily    mupirocin (Bactroban) 2 % ointment Apply a pea sized amount to the pad of the thumb, swipe into the nostril, sniff, then pinch the nose 2-3 times daily. Do NOT use a Q-tip.    rivaroxaban (Xarelto) 10 mg tablet 1 tablet, Daily       Lab Review   Recent Labs     08/31/24  0031 08/12/24  0723 01/18/24  1436 12/06/23  1306 11/28/23  1724 11/26/23  0101 07/03/23  0759 03/24/23  0329 03/17/23  0823 05/16/22  1648 04/09/22  0840 12/25/21  1303 12/13/21  0500    139  --  139 139 134* 139 137 139   < > 138   < > 136   K 3.7 4.3  --  4.1 3.5 3.9 4.6 3.8 4.2   < > 4.1   < > 5.5*    103  --  105 104 101 106 104 102   < > 104   < > 104   CO2 26 28  --  26 27 22 28 24 28   < > 25   < > 23   ANIONGAP 12 12  --  12 12 15 10 13 13   < > 13   < > 15   BUN 11 13 12 11 12 9 13 10 13   < > 12   < > 12   CREATININE 0.67 0.70 0.73 0.73 0.75 0.79 0.75 0.68 0.67   < > 0.78   < > 0.73   EGFR >90 >90 >90 >90 >90 >90  --   --   --   --   --   --   --    MG  --   --   --   --   --   --   --   --   --   --  1.80  --  2.20    < > = values in this interval not displayed.     Recent Labs     08/31/24  0031 08/12/24  0723 12/06/23  1306 11/28/23  1724 07/03/23  0759 03/24/23  0329 03/17/23  0823 07/27/22  1550 06/30/22  2036 06/08/22  0732 05/16/22  1648 04/09/22  0840 03/25/22  2043   ALBUMIN 4.0 4.1 4.0 4.3 4.2   < > 4.3   < > 4.5 4.5 4.8   < > 4.6   ALKPHOS 73 70 63 66 60   < > 56   < > 81 95 86   < > 94   ALT 21 20 20 21 15   < > 17   < > 18 21 33   < > 40   AST 18 15 21 16 14   < > 15   < > 16 17 22   < > 29   BILITOT 0.3 0.4 0.3 0.3 0.3   < > 0.4   < > 0.2 0.5 0.3   < > 0.3   LIPASE  --   --   --   --   --   --  31  --  39 27 50  --  37    < > = values in this interval not displayed.     Recent Labs     09/13/24  0811 08/31/24  0031 08/12/24  0723 12/06/23  1306 11/28/23  1724 11/26/23  0101 07/03/23  0800 03/24/23  0329   WBC 8.4 9.9 8.4  9.3 8.2 11.4* 6.5 9.6   HGB 13.3 13.3 12.8 12.5 13.1 13.8 12.8 12.4   HCT 41.2 39.1 39.4 37.2 39.5 41.3 40.4 37.8    333 298 339 352 346 342 313   MCV 89 85 88 86 87 86 90 85     Recent Labs     09/13/24  0811 03/24/23  0329 12/08/22  0814 11/21/22  1040 07/28/22  1234 07/28/22  0745 07/28/22  0258 07/27/22  2237 07/27/22  1550 04/09/22  0840 01/01/22  0950 12/25/21  1303 08/19/19  1034   INR 1.1 1.2* 1.2*  --   --   --   --   --  1.3*  --   --   --  1.0   HAUF  --   --   --   --  0.6 0.7 0.8 0.7  --   --   --   --   --    DDIMERVTE  --  524*  --  311  --   --   --   --   --  805* 441 420 830*     PTT - No results in last year.    Recent Labs     08/12/24  0723 07/03/23  0759 08/26/22  0754 01/31/22  0728   CHOL 172 200* 173 219*   LDLF  --  125* 92 128*   HDL 48.4 57.7 51.9 45.0   TRIG 154* 89 144 230*     Lab Results   Component Value Date    HGBA1C 6.7 (H) 08/12/2024     Lab Results   Component Value Date    TSH 2.96 09/13/2024     CBC, BMP and liver function reviewed    Imaging  LE DVT US 2/1/25- [facility]   Positive study for chronic proximal DVT in the left lower extremity:   Popliteal vein.   Negative study for calf DVT in the left lower extremity.   Negative study for superficial thrombophlebitis in the imaged segments of   the left lower extremity.     CT PE 11/2/24-[facility]   No CT evidence of pulmonary embolism or acute abnormality in the chest.     LLE DVT US 8/28/24  Positive study for acute proximal DVT in the left lower extremity.   Positive study for acute calf DVT in the left lower extremity.   Negative study for superficial thrombophlebitis in the imaged segments of   the left lower extremity     LLE DVT US 7/27/22  The left common femoral, superficial femoral, and popliteal veins are  noncompressible with heterogeneously echo thrombus in keeping  with acute deep venous thrombosis. This is new compared to the  comparison exam on 07/15/2022. The exam is positive for extensive  deep venous thrombus in  the left lower extremity the posterior tibial  and peroneal veins were not well seen.    CTPE 7/27/22  Positive for lobar, segmental, and subsegmental pulmonary emboli.  Wedge-shaped areas of partial consolidation in the right lower lobe  and left lower lobe most likely reflect pulmonary infarctions given  the pulmonary emboli. However, infectious/inflammatory process cannot  be excluded    Imaging reports and labs listed above reviewed     Assessment/Plan   Tsering Carranza is a 44 y.o. female with PMHx of HTN, HLD, DM, obesity, hypothyroidism, PCOS, IBS, renal stones, hepatic steatosis     _ 2015, RLE DVT during pregnancy; enoxaparin through whole pregnancy   _ 7/27/2022 Acute PE. Extensive LLE proximal DVT  In setting of cholecsytectomy 7/8/2022. Seen by Dr. Jones. Was on Lovenox x 3 months   Could not tolerate Eliquis, had dizziness and felt different  _ 8/2024 Acute FV-Pop-distal DVT (US done at [facility], images are not available to review).  Patient reports PE as well.  Unprovoked  Was started on Lovenox, seen by Dr. Thang Vinson, she tried Xarelto but she had brain fog and dizziness.  Lovenox was resumed  _ Hypercoagulable work up 2022: APLS, FVL, PT gene, SPEP, protein C/S/antithrombin Ag-Neg   No family history of VTE.  Mom was adopted, so family history is not entirely clear  PTS with secondary very early lymphedema changes  Body mass index is 41.96 kg/m².    Plan   --- We did have a long discussion in regards to anticoagulation choice, she would favor continuing Lovenox for now but will let me know if she would like to switch to Coumadin, will need full Lovenox to Coumadin bridge through the coumadin clinic   --- CBC, CMP   --- Duration of anticoagulation: As long as tolerated in setting of 3 VTE events, one of them is unprovoked especially with other possible contributing risk factors (PTS, obesity and unclear family history)  --- Continue following with the PCP for up-to-date cancer screening and weight  management  --- Advised against hormonal therapy if it is to be required in the future, pregnancies also needs to be planned but she has no intention of pregnancy at the moment   --- Daily use of compression stockings, leg elevation when sitting/sleeping and exercise as tolerated discussed and encouraged  --- Rest as detailed in the Pt´s instructions     Ade Lopez MD

## 2025-05-20 NOTE — PATIENT INSTRUCTIONS
--- Close monitoring to bleeding and fall precautions while on anticoagulation   --- Advise being up to date on screening, follow up with PCP  --- Advise against hormonal therapy if it is to be required in the future   --- Avoid first and second hand smoking   --- Weight loss highly encouraged  --- Use compression stockings daily, remove at night for comfort   --- Leg elevation above the level of the heart when sitting/sleeping  --- If traveling, please stop every 2 hours for at least 15 mins, walk around, wear your compression stockings   --- Follow up in 4-6 months, earlier if needed

## 2025-05-28 ENCOUNTER — APPOINTMENT (OUTPATIENT)
Dept: DERMATOLOGY | Facility: CLINIC | Age: 45
End: 2025-05-28
Payer: MEDICARE

## 2025-05-29 ENCOUNTER — APPOINTMENT (OUTPATIENT)
Dept: PRIMARY CARE | Facility: CLINIC | Age: 45
End: 2025-05-29
Payer: MEDICARE

## 2025-05-29 DIAGNOSIS — R79.89 LOW VITAMIN D LEVEL: ICD-10-CM

## 2025-05-30 ENCOUNTER — APPOINTMENT (OUTPATIENT)
Dept: OPHTHALMOLOGY | Facility: CLINIC | Age: 45
End: 2025-05-30
Payer: COMMERCIAL

## 2025-05-30 ENCOUNTER — PATIENT OUTREACH (OUTPATIENT)
Dept: PRIMARY CARE | Facility: CLINIC | Age: 45
End: 2025-05-30

## 2025-05-30 DIAGNOSIS — Z91.018 TREE NUT ALLERGY: ICD-10-CM

## 2025-05-30 RX ORDER — EPINEPHRINE 0.3 MG/.3ML
INJECTION SUBCUTANEOUS
Qty: 2 EACH | Refills: 0 | Status: SHIPPED | OUTPATIENT
Start: 2025-05-30

## 2025-05-30 NOTE — PROGRESS NOTES
Discharge Facility: [facility]    Discharge Diagnosis: Pneumonia of right lung due to infectious organism, unspecified part of lung    Admission Date: 05/26/2025  Discharge Date: 05/29/2025    PCP Appointment Date: Tasked to office, no contact made    Specialist Appointment Date: ENT 06/04/2025, OB/GYN 06/06/2025, 07/11/2025, 08/20/2025, Ortho Surg 06/18/2025, Ophth 07/15/2025, Hem/Onc 07/18/2025, GI 07/21/2025, Urology 10/08/2025, Cardio 10/13/2025, Derm 10/27/2025, Endo 10/30/2025  Hospital Encounter and Summary Linked: Yes  Hospital Encounter      Two attempts were made to reach patient within two business days after discharge. Left voicemail with contact information for patient to call back with any non-emergent questions or concerns.

## 2025-05-31 RX ORDER — CHOLECALCIFEROL (VITAMIN D3) 50 MCG
50 TABLET ORAL DAILY
Qty: 90 TABLET | Refills: 0 | Status: SHIPPED | OUTPATIENT
Start: 2025-05-31

## 2025-06-03 ENCOUNTER — APPOINTMENT (OUTPATIENT)
Dept: OBSTETRICS AND GYNECOLOGY | Facility: CLINIC | Age: 45
End: 2025-06-03
Payer: MEDICARE

## 2025-06-04 ENCOUNTER — APPOINTMENT (OUTPATIENT)
Dept: OTOLARYNGOLOGY | Facility: CLINIC | Age: 45
End: 2025-06-04
Payer: MEDICARE

## 2025-06-04 ENCOUNTER — OFFICE VISIT (OUTPATIENT)
Dept: OBSTETRICS AND GYNECOLOGY | Facility: CLINIC | Age: 45
End: 2025-06-04
Payer: MEDICARE

## 2025-06-04 VITALS — WEIGHT: 228 LBS | SYSTOLIC BLOOD PRESSURE: 124 MMHG | BODY MASS INDEX: 40.39 KG/M2 | DIASTOLIC BLOOD PRESSURE: 84 MMHG

## 2025-06-04 DIAGNOSIS — N89.8 VAGINAL IRRITATION: ICD-10-CM

## 2025-06-04 DIAGNOSIS — R10.2 PELVIC PAIN: Primary | ICD-10-CM

## 2025-06-04 DIAGNOSIS — N94.89 UTERINE PAIN: ICD-10-CM

## 2025-06-04 DIAGNOSIS — N89.8 VAGINAL DISCHARGE: ICD-10-CM

## 2025-06-04 DIAGNOSIS — R39.9 SYMPTOMS OF URINARY TRACT INFECTION: ICD-10-CM

## 2025-06-04 DIAGNOSIS — R10.2 PELVIC PRESSURE IN FEMALE: ICD-10-CM

## 2025-06-04 LAB
BILIRUBIN, POC: NEGATIVE
BLOOD URINE, POC: POSITIVE
CLARITY, POC: CLEAR
COLOR, POC: YELLOW
GLUCOSE URINE, POC: NEGATIVE
KETONES, POC: NEGATIVE
LEUKOCYTE EST, POC: NEGATIVE
NITRITE, POC: NEGATIVE
PH, POC: 6
SPECIFIC GRAVITY, POC: 1.03
URINE PROTEIN, POC: NEGATIVE
UROBILINOGEN, POC: NEGATIVE

## 2025-06-04 PROCEDURE — 99214 OFFICE O/P EST MOD 30 MIN: CPT | Performed by: OBSTETRICS & GYNECOLOGY

## 2025-06-04 PROCEDURE — 81002 URINALYSIS NONAUTO W/O SCOPE: CPT | Performed by: OBSTETRICS & GYNECOLOGY

## 2025-06-04 PROCEDURE — 3074F SYST BP LT 130 MM HG: CPT | Performed by: OBSTETRICS & GYNECOLOGY

## 2025-06-04 PROCEDURE — 1036F TOBACCO NON-USER: CPT | Performed by: OBSTETRICS & GYNECOLOGY

## 2025-06-04 PROCEDURE — 3079F DIAST BP 80-89 MM HG: CPT | Performed by: OBSTETRICS & GYNECOLOGY

## 2025-06-04 RX ORDER — CLOTRIMAZOLE AND BETAMETHASONE DIPROPIONATE 10; .64 MG/G; MG/G
1 CREAM TOPICAL 2 TIMES DAILY
Qty: 15 G | Refills: 3 | Status: SHIPPED | OUTPATIENT
Start: 2025-06-04 | End: 2025-07-02

## 2025-06-04 RX ORDER — METRONIDAZOLE 7.5 MG/G
GEL VAGINAL DAILY
Qty: 70 G | Refills: 1 | Status: SHIPPED | OUTPATIENT
Start: 2025-06-04 | End: 2025-06-09

## 2025-06-04 ASSESSMENT — ENCOUNTER SYMPTOMS
FREQUENCY: 0
DYSURIA: 0
ABDOMINAL PAIN: 0
BACK PAIN: 0
CONSTIPATION: 0
FEVER: 0
FLANK PAIN: 0
DIARRHEA: 0
HEMATURIA: 0
NAUSEA: 0
ANOREXIA: 0
VOMITING: 0
HEADACHES: 0
SORE THROAT: 0
CHILLS: 0

## 2025-06-05 LAB — BV SCORE VAG QL: NORMAL

## 2025-06-05 NOTE — PROGRESS NOTES
Tsering Carranza is a 44 y.o. female who presents with a chief complaint of Pelvic Pain (Pelvic pressure discharge and vaginal irritation)  SUBJECTIVE  She comes today regarding pelvic pressure, vaginal discharge and vulvar irritation.  She was recently hospitalized for a diagnosis of pneumonia and poorly controlled diabetes from -.  She is not on antibiotics currently.  She states there is itchiness but denies any rash or lesions.    She has not been sexually active.  Her cycles are irregular, she may occasionally skip 1 or 2 months.  Her last cycle was May 24, 2025.    Medical History[1]  Surgical History[2]  Social History[3]  Family History[4]    OB History    Para Term  AB Living   3 3 2 1     SAB IAB Ectopic Multiple Live Births             # Outcome Date GA Lbr Yariel/2nd Weight Sex Type Anes PTL Lv   3   36w0d             Birth Comments: HTN, DVT, daughter had jaundice   2 Term  39w0d    Vag-Spont         Birth Comments: HTN   1 Term                OBJECTIVE  RX Allergies[5]   Prescriptions Prior to Admission[6]     Review of Systems  Negative except vaginal itchiness and irritation.  Pelvic pressure.  Physical Exam  General Appearance: awake, alert, oriented, in no acute distress and well developed, well nourished  On exam, the external genitalia appear without lesions or rash.  On speculum exam no lesions.  There is a small to moderate amount of thin watery discharge with slight odor.  Bimanual exam is nontender, no masses.  /84   Wt 103 kg (228 lb)   LMP  (LMP Unknown)   BMI 40.39 kg/m²    Problem List Items Addressed This Visit    None  Visit Diagnoses         Pelvic pain    -  Primary    Relevant Orders    POCT urinalysis dipstick manually resulted (Completed)      Vaginal discharge        Relevant Medications    metroNIDAZOLE (Metrogel) 0.75 % (37.5mg/5 gram) vaginal gel    Other Relevant Orders    Vaginitis Gram Stain For Bacterial Vaginosis + Yeast       Vaginal irritation        Relevant Medications    clotrimazole-betamethasone (Lotrisone) cream      Uterine pain          Pelvic pressure in female        Relevant Orders    US PELVIS TRANSABDOMINAL WITH TRANSVAGINAL      Symptoms of urinary tract infection        Relevant Orders    Urine Culture        This is a 44-year-old female with vaginal discharge.  Symptoms and exam are suspicious for bacterial vaginosis.  Swab was sent for BV and yeast.  We discussed beginning metronidazole gel.   A urine culture was sent.   I did recommend Lotrisone externally for irritation as well.  I ordered a pelvic ultrasound, a CT scan in November 2024 noted a bulky uterus, her cycles are irregular.    I recommend returning in 2025 for annual exam and Pap smear.      Answers submitted by the patient for this visit:  Female Genital Questionnaire (Submitted on 6/4/2025)  Chief Complaint: Female genitourinary complaint  genital itching: Yes  genital lesions: No  genital odor: Yes  genital rash: No  missed menses: No  pelvic pain: No  vaginal bleeding: No  vaginal discharge: Yes  Chronicity: new  Onset: in the past 7 days  Frequency: daily  Progression since onset: unchanged  Pain severity: mild  Affected side: both  Pregnant now?: No  abdominal pain: No  anorexia: No  back pain: No  chills: No  constipation: No  diarrhea: No  discolored urine: No  dysuria: No  fever: No  flank pain: No  frequency: No  headaches: No  hematuria: No  joint pain: No  joint swelling: No  nausea: No  painful intercourse: No  rash: No  sore throat: No  urgency: No  vomiting: No  Aggravated by: urinating  Sexual activity: not sexually active  Partner with STD symptoms: no  Birth control: abstinence  Menstrual history: irregular  Discharge characteristics: yellow  Passing clots?: No  Passing tissue?: No         [1]   Past Medical History:  Diagnosis Date    Acne 1996    Allergic     Allergic rhinitis 09/17/2016    Allergic rhinitis due to dust mite, animal  dander, pollen    Anemia     Ankle sprain 1994    Anxiety     Calculus of kidney 06/16/2022    Kidney stone on left side    Corneal ulcer     Deep vein thrombosis (Multi) 2014    Depression     Disease of thyroid gland     Dry eyes     Eating disorder     Eczema 1992    Fibroid     Food intolerance     GERD (gastroesophageal reflux disease)     Heart murmur 1982    History of DVT (deep vein thrombosis)     2015 pregnancy    Hyperlipidemia 2021    Hypertension     Hypothyroidism 01/31/2014    on Levo    Migraine     Neck strain     Pneumonia November 2023    Polycystic ovarian syndrome 06/15/2020    PCOS (polycystic ovarian syndrome)    Pulmonary embolism     Radius and ulna distal fracture 1991    Skin tag     Tennis elbow     Type 2 diabetes mellitus     Type 2 diabetes mellitus without complications 08/26/2022    Diet-controlled diabetes mellitus    Urinary tract infection     Urticaria 2023    Varicella     Vitamin D deficiency    [2]   Past Surgical History:  Procedure Laterality Date    CHOLECYSTECTOMY      KIDNEY STONE SURGERY      LITHOTRIPSY      OTHER SURGICAL HISTORY  05/16/2022    Tonsillectomy    OTHER SURGICAL HISTORY  09/14/2022    Cholecystectomy laparoscopic    OTHER SURGICAL HISTORY  11/20/2020    Lithotripsy    WISDOM TOOTH EXTRACTION     [3]   Social History  Socioeconomic History    Marital status:    Tobacco Use    Smoking status: Never     Passive exposure: Never    Smokeless tobacco: Never   Substance and Sexual Activity    Alcohol use: Never    Drug use: Never    Sexual activity: Not Currently     Partners: Male     Birth control/protection: Abstinence     Social Drivers of Health     Food Insecurity: No Food Insecurity (5/27/2025)    Received from [facility]    Hunger Vital Sign     Worried About Running Out of Food in the Last Year: Never true     Ran Out of Food in the Last Year: Never true   Transportation Needs: No Transportation Needs (5/27/2025)    Received from [facility]    PRAPARE - Transportation     Lack of Transportation (Medical): No     Lack of Transportation (Non-Medical): No   Stress: Stress Concern Present (8/23/2023)    [employer] - Occupational Stress Questionnaire     Feeling of Stress : Rather much   Housing Stability: Unknown (5/27/2025)    Received from [facility]    Housing Stability Vital Sign     Unable to Pay for Housing in the Last Year: No     Homeless in the Last Year: No   [4]   Family History  Problem Relation Name Age of Onset    Cholelithiasis Mother Diane     Hyperlipidemia Mother Diane     Other (PREDIABETES) Mother Diane     Thyroid disease Mother Diane     Depression Father Quirino     Hypertension Father Quirino     Cataracts Father Quirino     Hearing loss Father Quirino     Asthma Sister Liset     Hypertension Sister Liset     Alcohol abuse Sister Liset     Mental illness Sister Liset     Depression Sister Liset     Allergic rhinitis Sister Liset     Asthma Daughter Caroline     Depression Daughter Caroline     Other (CARDIAC DISORDER) Other GRANDFATHER     Heart disease Paternal Grandfather laquita     Heart failure Paternal Grandfather laquita     Heart disease Father's Sister Diane Swanson     Heart disease Father's Sister ronal    [5]   Allergies  Allergen Reactions    Azithromycin Dizziness and Other    Bee Pollen Itching    Nut - Unspecified Itching    Pollen Extracts Itching    Prednisone Other    Strawberry Itching    Doxycycline Hives    Duloxetine Other and Unknown     change in mental heart racing elevated BP    Fluoxetine Hcl Anxiety and Unknown     Panic, high anxiety, fatigue    Grass Pollen Itching and Rash    House Dust Itching and Rash   [6] (Not in a hospital admission)

## 2025-06-06 ENCOUNTER — APPOINTMENT (OUTPATIENT)
Facility: CLINIC | Age: 45
End: 2025-06-06
Payer: MEDICARE

## 2025-06-06 LAB — BACTERIA UR CULT: NORMAL

## 2025-06-09 ENCOUNTER — APPOINTMENT (OUTPATIENT)
Dept: PRIMARY CARE | Facility: CLINIC | Age: 45
End: 2025-06-09
Payer: MEDICARE

## 2025-06-09 VITALS
TEMPERATURE: 96.4 F | HEART RATE: 78 BPM | OXYGEN SATURATION: 97 % | BODY MASS INDEX: 40.96 KG/M2 | WEIGHT: 231.2 LBS | DIASTOLIC BLOOD PRESSURE: 83 MMHG | SYSTOLIC BLOOD PRESSURE: 135 MMHG

## 2025-06-09 DIAGNOSIS — E03.9 HYPOTHYROIDISM, UNSPECIFIED TYPE: ICD-10-CM

## 2025-06-09 DIAGNOSIS — J40 BRONCHITIS: ICD-10-CM

## 2025-06-09 DIAGNOSIS — E11.65 TYPE 2 DIABETES MELLITUS WITH HYPERGLYCEMIA, WITHOUT LONG-TERM CURRENT USE OF INSULIN: Primary | ICD-10-CM

## 2025-06-09 DIAGNOSIS — E55.9 VITAMIN D DEFICIENCY: ICD-10-CM

## 2025-06-09 DIAGNOSIS — E78.2 MIXED HYPERLIPIDEMIA: ICD-10-CM

## 2025-06-09 DIAGNOSIS — D50.9 IRON DEFICIENCY ANEMIA, UNSPECIFIED IRON DEFICIENCY ANEMIA TYPE: ICD-10-CM

## 2025-06-09 PROCEDURE — 99495 TRANSJ CARE MGMT MOD F2F 14D: CPT | Performed by: INTERNAL MEDICINE

## 2025-06-09 PROCEDURE — 3079F DIAST BP 80-89 MM HG: CPT | Performed by: INTERNAL MEDICINE

## 2025-06-09 PROCEDURE — 3075F SYST BP GE 130 - 139MM HG: CPT | Performed by: INTERNAL MEDICINE

## 2025-06-09 RX ORDER — ATORVASTATIN CALCIUM 20 MG/1
20 TABLET, FILM COATED ORAL DAILY
Start: 2025-06-09 | End: 2026-07-14

## 2025-06-09 RX ORDER — PIOGLITAZONE 15 MG/1
15 TABLET ORAL DAILY
Start: 2025-06-09 | End: 2026-06-09

## 2025-06-09 RX ORDER — INSULIN GLARGINE 100 [IU]/ML
50 INJECTION, SOLUTION SUBCUTANEOUS NIGHTLY
Start: 2025-06-09 | End: 2026-06-09

## 2025-06-09 RX ORDER — ALBUTEROL SULFATE 90 UG/1
2 INHALANT RESPIRATORY (INHALATION) EVERY 6 HOURS PRN
Qty: 18 G | Refills: 1 | Status: SHIPPED | OUTPATIENT
Start: 2025-06-09 | End: 2026-06-09

## 2025-06-09 RX ORDER — FENOFIBRATE 160 MG/1
160 TABLET ORAL DAILY
Start: 2025-06-09 | End: 2025-12-06

## 2025-06-09 ASSESSMENT — PATIENT HEALTH QUESTIONNAIRE - PHQ9
1. LITTLE INTEREST OR PLEASURE IN DOING THINGS: SEVERAL DAYS
SUM OF ALL RESPONSES TO PHQ9 QUESTIONS 1 AND 2: 2
2. FEELING DOWN, DEPRESSED OR HOPELESS: SEVERAL DAYS

## 2025-06-09 NOTE — PROGRESS NOTES
Primary care office Note      Name: Tsering Carranza, Age: 44 y.o., Gender: female, MRN: [id number]   Pharmacy:   [id number] [place] [id number]  Phone: [phone] Fax: [phone]     PCP: Mayela Torres        Subjective:   Chief Complaint   Patient presents with    Follow-up        Tsering Carranza is a 44 y.o. female who presented to the clinic today for hospital follow up.    HPI:   Tsering Carranza is a 44 y.o. female  Overall patient states she is feeling okay.  Patient was recently in the hospital for pneumonia as well as uncontrolled blood sugars.  Patient was treated on an insulin drip and switched to subcutaneous insulin long-acting at home.  Patient has been home for 8 days and has not started her insulin or oral diabetic medications.  Patient was waiting to discuss this at our visit.  Patient was continued on Lovenox for history of blood clots (PE/DVT).  Patient does states she has been experiencing some bleeding - nose bleeds and heavy menstrual bleeding   Patient has follow-ups with all of her specialists coming up.    The patient denies headaches, lightheadedness, changes in speech/vision, photophobia, dysphagia, diaphoresis, Chest pain, dyspnea, Abdominal pain, recent falls or trauma, and changes in bowel/urinary habits.     ROS:   12 points review of system is negative excepted as stated above in the HPI.    Medical History    Problem List:  Problem List[1]   PMH:    has a past medical history of Acne (1996), Allergic, Allergic rhinitis (09/17/2016), Anemia, Ankle sprain (1994), Anxiety, Calculus of kidney (06/16/2022), Corneal ulcer, Deep vein thrombosis (Multi) (2014), Depression, Disease of thyroid gland, Dry eyes, Eating disorder, Eczema (1992), Fibroid, Food intolerance, GERD (gastroesophageal reflux disease), Heart murmur (1982), History of DVT (deep vein thrombosis), Hyperlipidemia (2021), Hypertension,  Hypothyroidism (01/31/2014), Migraine, Neck strain, Pneumonia (November 2023), Polycystic ovarian syndrome (06/15/2020), Pulmonary embolism, Radius and ulna distal fracture (1991), Skin tag, Tennis elbow, Type 2 diabetes mellitus, Type 2 diabetes mellitus without complications (08/26/2022), Urinary tract infection, Urticaria (2023), Varicella, and Vitamin D deficiency.   Allergies:   Allergies[2]   Surgical Hx:   Surgical History[3]   Social HX:   Social History[4]     MEDS:   Current Outpatient Medications   Medication Instructions    albuterol 90 mcg/actuation inhaler 2 puffs, inhalation, Every 6 hours PRN    alcohol swabs pads, medicated To test once daily    atorvastatin (LIPITOR) 20 mg, oral, Daily    Basaglar KwikPen U-100 Insulin 50 Units, subcutaneous, Nightly, Take as directed per insulin instructions.    blood sugar diagnostic (Blood Glucose Test) To test once daily    cholecalciferol (VITAMIN D-3) 50 mcg, oral, Daily    enoxaparin (LOVENOX) 100 mg, 2 times daily    EPINEPHrine 0.3 mg/0.3 mL injection syringe INJECT INTO LATERAL THIGH FOR ANAPHYLAXIS. CALL 911 AFTER USE. MAY REPEAT AFTER 5 MINUTES IF NOT EFFECTIVE.    fenofibrate (TRIGLIDE) 160 mg, oral, Daily    lancets misc To test once daily    levothyroxine (SYNTHROID, LEVOXYL) 112 mcg, oral, Daily    pioglitazone (ACTOS) 15 mg, oral, Daily        Objective Data     Objective:   Visit Vitals  /83   Pulse 78   Temp 35.8 °C (96.4 °F) (Temporal)        Physical Examination:   GE; sitting comfortably in a chair, in NAD  HEENT; AT/NC, no conjunctival pallor, anicteric sclera  Neck; Supple neck, no LAD/JVD  Chest; CTAbl, no adventitious sounds  CVS; s1 and s2 only no MGR, RRR  Ext; no cyanosis/clubbing/edema, pulses intact  Neuro; Aox3  Psych; normal mood     Last Labs:   CBC:   WBC   Date Value Ref Range Status   09/13/2024 8.4 4.4 - 11.3 x10*3/uL Final   08/31/2024 9.9 4.4 - 11.3 x10*3/uL Final   08/12/2024 8.4 4.4 - 11.3 x10*3/uL Final      Hemoglobin   Date Value Ref Range Status   09/13/2024 13.3 12.0 - 16.0 g/dL Final   08/31/2024 13.3 12.0 - 16.0 g/dL Final   08/12/2024 12.8 12.0 - 16.0 g/dL Final     MCV   Date Value Ref Range Status   09/13/2024 89 80 - 100 fL Final   08/31/2024 85 80 - 100 fL Final   08/12/2024 88 80 - 100 fL Final     Platelets   Date Value Ref Range Status   09/13/2024 310 150 - 450 x10*3/uL Final   08/31/2024 333 150 - 450 x10*3/uL Final   08/12/2024 298 150 - 450 x10*3/uL Final      CMP:   Sodium   Date Value Ref Range Status   08/31/2024 137 136 - 145 mmol/L Final   08/12/2024 139 136 - 145 mmol/L Final   12/06/2023 139 136 - 145 mmol/L Final     Potassium   Date Value Ref Range Status   08/31/2024 3.7 3.5 - 5.3 mmol/L Final   08/12/2024 4.3 3.5 - 5.3 mmol/L Final   12/06/2023 4.1 3.5 - 5.3 mmol/L Final     Comment:     MILD HEMOLYSIS DETECTED. The result may be falsely elevated due to hemolysis or other interferents. Clinical correlation is recommended. Repeat testing may be considered.     Chloride   Date Value Ref Range Status   08/31/2024 103 98 - 107 mmol/L Final   08/12/2024 103 98 - 107 mmol/L Final   12/06/2023 105 98 - 107 mmol/L Final     Urea Nitrogen   Date Value Ref Range Status   08/31/2024 11 6 - 23 mg/dL Final   08/12/2024 13 6 - 23 mg/dL Final   01/18/2024 12 6 - 23 mg/dL Final     Creatinine   Date Value Ref Range Status   08/31/2024 0.67 0.50 - 1.05 mg/dL Final   08/12/2024 0.70 0.50 - 1.05 mg/dL Final   01/18/2024 0.73 0.50 - 1.05 mg/dL Final     eGFR   Date Value Ref Range Status   08/31/2024 >90 >60 mL/min/1.73m*2 Final     Comment:     Calculations of estimated GFR are performed using the 2021 CKD-EPI Study Refit equation without the race variable for the IDMS-Traceable creatinine methods.  https://jasn.asnjournals.org/content/early/2021/09/22/[email or web address]   08/12/2024 >90 >60 mL/min/1.73m*2 Final     Comment:     Calculations of estimated GFR are performed using the 2021 CKD-EPI Study Refit  equation without the race variable for the IDMS-Traceable creatinine methods.  https://jasn.asnjournals.org/content/early/2021/09/[email or web address]/[email or web address]   01/18/2024 >90 >60 mL/min/1.73m*2 Final     Comment:     Calculations of estimated GFR are performed using the 2021 CKD-EPI Study Refit equation without the race variable for the IDMS-Traceable creatinine methods.  [email or web address]://jasn.asnjournals.org/content/early/2021/09/22/[email or web address]      A1c:   Hemoglobin A1C   Date Value Ref Range Status   05/26/2025 10 (H) 4.3 - 5.6 % Final     Comment:     American Diabetes Association guidelines indicate that patients with HgbA1c in the range 5.7-6.4% are at increased risk for development of diabetes, and intervention by lifestyle modification may be beneficial. HgbA1c greater or equal to 6.5% is considered diagnostic of diabetes.   08/12/2024 6.7 (H) see below % Final   07/03/2023 5.4 % Final     Comment:          Diagnosis of Diabetes-Adults   Non-Diabetic: < or = 5.6%   Increased risk for developing diabetes: 5.7-6.4%   Diagnostic of diabetes: > or = 6.5%  .       Monitoring of Diabetes                Age (y)     Therapeutic Goal (%)   Adults:          >18           <7.0   Pediatrics:    13-18           <7.5                   7-12           <8.0                   0- 6            7.5-8.5   American Diabetes Association. Diabetes Care 33(S1), Jan 2010.          Other labs;   Vit D:   Vitamin D, 25-Hydroxy, Total   Date Value Ref Range Status   08/12/2024 30 30 - 100 ng/mL Final     Vitamin D, 25-Hydroxy   Date Value Ref Range Status   07/03/2023 26 (A) ng/mL Final     Comment:     .  DEFICIENCY:         < 20   NG/ML  INSUFFICIENCY:      20-29  NG/ML  SUFFICIENCY:         NG/ML    THIS ASSAY ACCURATELY QUANTIFIES THE SUM OF  VITAMIN D3, 25-HYDROXY AND VIT D2,25-HYDROXY.     02/17/2023 29 (A) ng/mL Final     Comment:     .  DEFICIENCY:         < 20   NG/ML  INSUFFICIENCY:      20-29  NG/ML  SUFFICIENCY:          NG/ML    THIS ASSAY ACCURATELY QUANTIFIES THE SUM OF  VITAMIN D3, 25-HYDROXY AND VIT D2,25-HYDROXY.        TSH:  Thyroid Stimulating Hormone   Date Value Ref Range Status   09/13/2024 2.96 0.44 - 3.98 mIU/L Final   08/31/2024 3.05 0.44 - 3.98 mIU/L Final   08/12/2024 5.51 (H) 0.44 - 3.98 mIU/L Final        Assessment and Plan     Problem List Items Addressed This Visit       Hypothyroidism    Relevant Medications    pioglitazone (Actos) 15 mg tablet    atorvastatin (Lipitor) 20 mg tablet    Other Relevant Orders    TSH    Iron deficiency anemia    Relevant Orders    Ferritin    CBC and Auto Differential    Iron and TIBC    Vitamin B12    Folate    Vitamin D deficiency    Relevant Orders    Vitamin D 25-Hydroxy,Total (for eval of Vitamin D levels)    Mixed hyperlipidemia    Type 2 diabetes mellitus with hyperglycemia, without long-term current use of insulin - Primary    Relevant Medications    insulin glargine (Basaglar KwikPen U-100 Insulin) 100 unit/mL (3 mL) pen    fenofibrate (Triglide) 160 mg tablet    Other Relevant Orders    Referral to Clinical Pharmacy    Comprehensive Metabolic Panel    Albumin-Creatinine Ratio, Urine Random    Referral to Nutrition Services    Referral to Population Health Services     Other Visit Diagnoses         Bronchitis        Relevant Medications    albuterol 90 mcg/actuation inhaler          Follow up in 3 months     Mayela Torres DO            [1]   Patient Active Problem List  Diagnosis    Allergic rhinitis    Anxiety and depression    Chronic deep vein thrombosis (DVT) of popliteal vein of left lower extremity    Follicular cyst of left ovary    History of DVT (deep vein thrombosis)    Hypersomnolence    Hypertension    Hypothyroidism    IBS (irritable bowel syndrome)    Iron deficiency anemia    Low vitamin D level    Nightmares associated with chronic post-traumatic stress disorder    Vestibular migraine    Vitamin D deficiency    Obesity (BMI 30.0-34.9)    Mixed  hyperlipidemia    Post-concussion syndrome    Concussion with no loss of consciousness, subsequent encounter    Dizziness    Cervicalgia    Acute deep vein thrombosis (DVT) of distal vein of left lower extremity    Anterior tibialis tendinitis    Binge eating    Chronic diarrhea    Disturbance in sleep behavior    Insulin resistance    Nausea and vomiting    Neuroma of foot    Pain of right shoulder region    Polycystic ovary syndrome    Prediabetes    Radicular pain    Restless legs syndrome    Severe obesity (Multi)    Snoring    Migraine headache    Mild bulimia nervosa    Type 2 diabetes mellitus with hyperglycemia, without long-term current use of insulin   [2]   Allergies  Allergen Reactions    Azithromycin Dizziness and Other    Bee Pollen Itching    Nut - Unspecified Itching    Pollen Extracts Itching    Prednisone Other    Strawberry Itching    Doxycycline Hives    Duloxetine Other and Unknown     change in mental heart racing elevated BP    Fluoxetine Hcl Anxiety and Unknown     Panic, high anxiety, fatigue    Grass Pollen Itching and Rash    House Dust Itching and Rash   [3]   Past Surgical History:  Procedure Laterality Date    CHOLECYSTECTOMY      KIDNEY STONE SURGERY      LITHOTRIPSY      OTHER SURGICAL HISTORY  05/16/2022    Tonsillectomy    OTHER SURGICAL HISTORY  09/14/2022    Cholecystectomy laparoscopic    OTHER SURGICAL HISTORY  11/20/2020    Lithotripsy    WISDOM TOOTH EXTRACTION     [4]   Social History  Tobacco Use    Smoking status: Never     Passive exposure: Never    Smokeless tobacco: Never   Substance Use Topics    Alcohol use: Never    Drug use: Never

## 2025-06-12 ENCOUNTER — APPOINTMENT (OUTPATIENT)
Dept: PRIMARY CARE | Facility: CLINIC | Age: 45
End: 2025-06-12
Payer: MEDICARE

## 2025-06-16 ENCOUNTER — APPOINTMENT (OUTPATIENT)
Dept: NUTRITION | Facility: CLINIC | Age: 45
End: 2025-06-16
Payer: MEDICARE

## 2025-06-16 ENCOUNTER — PATIENT OUTREACH (OUTPATIENT)
Dept: PRIMARY CARE | Facility: CLINIC | Age: 45
End: 2025-06-16
Payer: MEDICARE

## 2025-06-17 ENCOUNTER — APPOINTMENT (OUTPATIENT)
Dept: PRIMARY CARE | Facility: CLINIC | Age: 45
End: 2025-06-17
Payer: MEDICARE

## 2025-06-18 ENCOUNTER — APPOINTMENT (OUTPATIENT)
Dept: ORTHOPEDIC SURGERY | Facility: HOSPITAL | Age: 45
End: 2025-06-18
Payer: MEDICARE

## 2025-06-24 ENCOUNTER — APPOINTMENT (OUTPATIENT)
Dept: ENDOCRINOLOGY | Facility: CLINIC | Age: 45
End: 2025-06-24
Payer: MEDICARE

## 2025-06-24 VITALS — WEIGHT: 225 LBS | BODY MASS INDEX: 39.87 KG/M2 | HEIGHT: 63 IN

## 2025-06-24 DIAGNOSIS — E11.65 TYPE 2 DIABETES MELLITUS WITH HYPERGLYCEMIA, WITHOUT LONG-TERM CURRENT USE OF INSULIN: ICD-10-CM

## 2025-06-24 DIAGNOSIS — Z71.3 DIETARY COUNSELING: Primary | ICD-10-CM

## 2025-06-24 NOTE — PROGRESS NOTES
"Initial Nutrition Assessment    Patient was referred to nutrition by Mayela Torres DO for weight management/desire to lose weight, as well as for education on healthy eating for consideration of Type 2 Diabetes. Other PMHX significant for HTN, PCOS, hypothyroidism, HLD, insulin resistance, anxiety, depression, hx of MI, history of restrictive/binge eating. Pertinent labs reviewed which show A1C 10.0%.       Diet recall reveals an inconsistent meal pattern with frequently missed meals, as well as reported intakes of larger portions and calorically dense foods all likely contributing to lack of desired weight loss/contributing to weight gain over time.  Fluids mostly meeting recommendations in type and amount with water as primary beverage; however, on low end of recommendations and pt would likely benefit from increasing intakes for hydration needs. Pt is working on incorporating consistent physical activity at this time and would likely benefit from increased structured activity to assist in achieving goals.     See all interventions/recommendations below as discussed during visit this day.     Patient reported symptoms: Difficulty losing weight    Anthropometrics:  Height:   Ht Readings from Last 1 Encounters:   05/20/25 1.6 m (5' 3\")      Weight:   Wt Readings from Last 10 Encounters:   06/09/25 105 kg (231 lb 3.2 oz)   06/04/25 103 kg (228 lb)   05/20/25 107 kg (236 lb 14.4 oz)   05/07/25 107 kg (235 lb)   04/30/25 107 kg (235 lb)   04/22/25 109 kg (240 lb 9.6 oz)   04/11/25 110 kg (241 lb 11.2 oz)   04/09/25 110 kg (243 lb)   02/01/25 110 kg (243 lb 6.4 oz)   12/06/24 111 kg (245 lb)      Current BMI:   BMI Readings from Last 1 Encounters:   06/09/25 40.96 kg/m²        Labs:  Lab Results   Component Value Date    HGBA1C 10 (H) 05/26/2025      Lab Results   Component Value Date    CHOL 172 08/12/2024    CHOL 200 (H) 07/03/2023    CHOL 173 08/26/2022     Lab Results   Component Value Date    HDL 48.4 " 08/12/2024    HDL 57.7 07/03/2023    HDL 51.9 08/26/2022     Lab Results   Component Value Date    LDLCALC 93 08/12/2024     Lab Results   Component Value Date    TRIG 154 (H) 08/12/2024    TRIG 89 07/03/2023    TRIG 144 08/26/2022       Malnutrition Screening:  Significant Unintentional weight loss: No  Eating less than 75% of usual intake for more than 2 weeks: No  Potential Signs of Inflammation: No    Recommended Malnutrition Diagnosis: No malnutrition identified    Diet Recall-  Breakfast- Skip OR 2 Turkey sausage   Lunch-  Carrots, turkey burger melissa   Dinner- grilled chicken sandwich on a bun OR frozen chicken melissa on white bread with peaches or apple sauce   Daily Snacks- Popcorn, mini ice cream bar, or chocolate bar   Beverages- water 40- 60 oz water  Alcohol- none     Physical Activity- Walking on the treadmill 3-5 days for 30 minutes     Other pertinent patient reported Information:  - Past weight loss attempts include: restrict/binge eating cycle.   - 6 lb weight loss since beginning of June by counting calories (1718-1737 kcals- tracking using simple calorie count) and walking on the treadmill  - Reports fixating on foods but not knowing what would be best to manage glucose levels - carb goals provided for meals/snacks.    -Type 2 DM x 3 years, currently using a glucometer but did have a CGM ordered. Pt reports fasting glucose is around 160's, not tracking sugar regularly. Did not get CGM, feels intimidated by it. Pt reports that she was prescribed Actos but did not start it yet, does not like starting new medications, edu provided on importance of managing glucose levels now to prevent complications down the road. Pt would like to referred for DSME education.   -Reports a major sweet tooth and eating a lot of fast foods, trying to make better choices     Nutrition Diagnosis: Overweight/Obesity related to food-and-nutrition-related knowledge deficit as evidenced by abnormal Hgb A1C and BMI above  normative standard for age and gender.    Readiness to Learn:  Cognitive ability: Alert and oriented  Motivation to learn: Eager  Family Support: Unable to assess- family not present  Instruction provided to: Patient  Patient learns best by: Multiple methods  Factors affecting learning: None   Physical limitations affecting learning: None    Education Materials Provided:   Your Mediterranean Meal Plan Booklet    Nutrition Interventions/Recommendations for 6/24/2025:  - Can follow the healthy plate at meal times to assist with portion control as well as to provide well balanced meals.  - Healthy Plate: Use a 9-inch diameter plate and aim for ½ plate non-starchy vegetables, ¼ plate lean protein, and ¼ plate complex carbohydrates.  - Have a goal of consuming a lean source of protein at meals to assist in providing better satiety and better blood sugar control as discussed.   - Aim for no more than 45 grams of carbohydrate at main meal times and no more than 15 grams of carbohydrate at a snack time.    - Choose foods high in fiber such as whole grain breads, cereals, pasta, brown rice, fruits, and vegetables. These foods are digested more slowly, which helps to better control blood sugar levels as well as provide better satiety.  - Choose foods with less sugar and fats and avoid high calorie snack foods and baked goods such as chips, cakes, cookies, etc. most often.  - Continue to choose sugar-free, calorie-free beverages and aim for a minimum of 64 ounces of water daily.  - Aim for 150 minutes of moderate physical activity weekly and within this 150 minutes, aim to incorporate 2 times weekly of resistance training.  - Continue to check blood sugar levels regularly as discussed to determine correlation between food choices and amounts of blood sugar rise.    - Follow-up with nutrition 4-6 weeks.     Nutrition Monitoring & Evaluation: adherence to recommendations and patient stated goals    Need for follow-up: Individual  Nutrition Visit in 4-6 weeks    Referred by: Mayela Torres DO    MNT Billing Type: Medical Nutrition Assessment, each 15 min increment, for 3 increments.    SIGNATURE:   Madison Gramajo MS, RD, LD                                                      DATE:   6/24/2025

## 2025-06-24 NOTE — PATIENT INSTRUCTIONS
- Can follow the healthy plate at meal times to assist with portion control as well as to provide well balanced meals.  - Healthy Plate: Use a 9-inch diameter plate and aim for ½ plate non-starchy vegetables, ¼ plate lean protein, and ¼ plate complex carbohydrates.  - Have a goal of consuming a lean source of protein at meals to assist in providing better satiety and better blood sugar control as discussed.   - Aim for no more than 45 grams of carbohydrate at main meal times and no more than 15 grams of carbohydrate at a snack time.    - Choose foods high in fiber such as whole grain breads, cereals, pasta, brown rice, fruits, and vegetables. These foods are digested more slowly, which helps to better control blood sugar levels as well as provide better satiety.  - Choose foods with less sugar and fats and avoid high calorie snack foods and baked goods such as chips, cakes, cookies, etc. most often.  - Continue to choose sugar-free, calorie-free beverages and aim for a minimum of 64 ounces of water daily.  - Aim for 150 minutes of moderate physical activity weekly and within this 150 minutes, aim to incorporate 2 times weekly of resistance training.  - Continue to check blood sugar levels regularly as discussed to determine correlation between food choices and amounts of blood sugar rise.    - Follow-up with nutrition 4-6 weeks.

## 2025-06-24 NOTE — Clinical Note
Can you please place this pt on my schedule on July 28th at 2:15pm for a in-person visit please.  Thank you

## 2025-06-24 NOTE — PROGRESS NOTES
Clinical Pharmacy Appointment    Patient ID: Tsering Carranza is a 44 y.o. female who presents for Diabetes.    Pt is here for First appointment.     Referring Provider: Mayela Torres DO  PCP: Mayela Torres DO  Last visit with PCP: 6/9/2025   Next visit with PCP: 9/10/2025    Subjective   Medication Reconciliation:  Changed: none  Added: none  Discontinued: none    Drug Interactions  No relevant drug interactions were noted.    Medication System Management  Patient's preferred pharmacy: [facility]  Adherence/Organization: Concerned for adherence; patient has not started a few medications due to concerns for side effects   Affordability/Accessibility: No concerns       Interval History  N/A    HPI  Type II Diabetes  Current  Pharmacotherapy:   Pioglitazone 15 mg daily  Basaglar 50 units nightly      SECONDARY PREVENTION  - Statin? Atorvastatin 20 mg   LDL: 93   TC: 172  - ACE-I/ARB? none   UACR: 0  Albumin/Creatine Ratio   Date Value Ref Range Status   07/03/2023 SEE COMMENT 0.0 - 30.0 ug/mg crt Final     Comment:     One or more analytes used in this calculation   is outside of the analytical measurement range.  Calculation cannot be performed.        BP: 135/83  - Aspirin? none       Current monitoring regimen:   Patient is using: finger sticks, in the morning      SMBG Fasting Readings: 160    Hypoglycemia? None    Pertinent PMH Review:  - PMH of Pancreatitis: No  - PMH/FH of Medullary Thyroid Cancer: No  - PMH of Retinopathy: No   - PMH of Urinary Tract Infections: n/a    Complications:  - YADIRA: No  - CKD: No    -The 10-year ASCVD risk score (Merry TAO, et al., 2019) is: 1.3%    Values used to calculate the score:      Age: 44 years      Sex: Female      Is Non- : No      Diabetic: Yes      Tobacco smoker: No      Systolic Blood Pressure: 135 mmHg      Is BP treated: No      HDL Cholesterol: 48.4 mg/dL      Total Cholesterol: 172 mg/dL      Diet/Lifestyle:          Objective    Allergies[1]  Social History     Social History Narrative    Not on file      Medication Review  Current Outpatient Medications   Medication Instructions    albuterol 90 mcg/actuation inhaler 2 puffs, inhalation, Every 6 hours PRN    alcohol swabs pads, medicated To test once daily    atorvastatin (LIPITOR) 20 mg, oral, Daily    Basaglar KwikPen U-100 Insulin 50 Units, subcutaneous, Nightly, Take as directed per insulin instructions.    blood sugar diagnostic (Blood Glucose Test) To test once daily    cholecalciferol (VITAMIN D-3) 50 mcg, oral, Daily    enoxaparin (LOVENOX) 100 mg, 2 times daily    EPINEPHrine 0.3 mg/0.3 mL injection syringe INJECT INTO LATERAL THIGH FOR ANAPHYLAXIS. CALL 911 AFTER USE. MAY REPEAT AFTER 5 MINUTES IF NOT EFFECTIVE.    fenofibrate (TRIGLIDE) 160 mg, oral, Daily    lancets misc To test once daily    levothyroxine (SYNTHROID, LEVOXYL) 112 mcg, oral, Daily    Mounjaro 2.5 mg, subcutaneous, Weekly    pioglitazone (ACTOS) 15 mg, oral, Daily      Vitals  BP Readings from Last 2 Encounters:   06/09/25 135/83   06/04/25 124/84     BMI Readings from Last 1 Encounters:   06/24/25 39.86 kg/m²      Labs  A1C  Lab Results   Component Value Date    HGBA1C 10 (H) 05/26/2025    HGBA1C 6.7 (H) 08/12/2024    HGBA1C 5.4 07/03/2023     BMP  Lab Results   Component Value Date    CALCIUM 9.2 08/31/2024     08/31/2024    K 3.7 08/31/2024    CO2 26 08/31/2024     08/31/2024    BUN 11 08/31/2024    CREATININE 0.67 08/31/2024    EGFR >90 08/31/2024     LFTs  Lab Results   Component Value Date    ALT 21 08/31/2024    AST 18 08/31/2024    ALKPHOS 73 08/31/2024    BILITOT 0.3 08/31/2024     FLP  Lab Results   Component Value Date    TRIG 154 (H) 08/12/2024    CHOL 172 08/12/2024    LDLF 125 (H) 07/03/2023    LDLCALC 93 08/12/2024    HDL 48.4 08/12/2024     Urine Microalbumin  Lab Results   Component Value Date    MICROALBCREA SEE COMMENT 07/03/2023     Weight Management  Wt Readings from Last 3  Encounters:   06/24/25 102 kg (225 lb)   06/09/25 105 kg (231 lb 3.2 oz)   06/04/25 103 kg (228 lb)      There is no height or weight on file to calculate BMI.     Assessment/Plan   Problem List Items Addressed This Visit       Type 2 diabetes mellitus with hyperglycemia, without long-term current use of insulin - Primary    Patient has uncontrolled diabetes with an A1c of 10% (goal <7%).     Patient was prescribed insulin glargine and pioglitazone at her last PCP visit but has not started either medication due to concerns about potential side effects. She shared that during a recent hospitalization, the endocrinologist mentioned insulin would only be needed for approximately three weeks. The patient feels insulin may not be necessary as her fasting glucose levels are in the 160s, and she is concerned about the risk of hypoglycemia. She declined to start insulin despite encouragement. Patient was counseled on appropriate fasting glucose and A1c targets and expressed interest in focusing on diet and lifestyle changes instead.    We also discussed initiating a GLP-1. Although initially hesitant due to her history of IBS, she agreed to start the medication given its once-weekly injection schedule.    Plan:  START Mounjaro 2.5 mg weekly  Counseled patient on MOA, expectations, side effects, duration of therapy, contraindications, administration techniques, and monitoring parameters  Answered all other questions and concerns          Relevant Medications    tirzepatide (Mounjaro) 2.5 mg/0.5 mL pen injector    Other Relevant Orders    Referral to Clinical Pharmacy     Patient discussion: Patient reported being prescribed fenofibrate and atorvastatin but has not started either medication due to concerns about potential side effects. We discussed that elevated triglycerides, particularly when combined with high LDL or low HD can contribute to plaque buildup and increase cardiovascular risk. Patient agreed to initiate one  medication at a time to better identify and manage any side effects that may arise. We will follow up in 2 weeks after Mounjaro initiation to see if she is agreeable to starting fenofibrate.     Clinical Pharmacist follow-up: 7/9 @ 3:40, Telehealth visit    Continue all meds under the continuation of care with the referring provider and clinical pharmacy team.    Vernell Wheatley, PharmD  PGY-1 Pharmacy Resident     Verbal consent to manage patient's drug therapy was obtained from the patient. They were informed they may decline to participate or withdraw from participation in pharmacy services at any time.         [1]   Allergies  Allergen Reactions    Azithromycin Dizziness and Other    Bee Pollen Itching    Nut - Unspecified Itching    Pollen Extracts Itching    Prednisone Other    Strawberry Itching    Doxycycline Hives    Duloxetine Other and Unknown     change in mental heart racing elevated BP    Fluoxetine Hcl Anxiety and Unknown     Panic, high anxiety, fatigue    Grass Pollen Itching and Rash    House Dust Itching and Rash

## 2025-06-25 ENCOUNTER — APPOINTMENT (OUTPATIENT)
Dept: PRIMARY CARE | Facility: CLINIC | Age: 45
End: 2025-06-25
Payer: MEDICARE

## 2025-06-27 ENCOUNTER — APPOINTMENT (OUTPATIENT)
Dept: PHARMACY | Facility: HOSPITAL | Age: 45
End: 2025-06-27
Payer: MEDICARE

## 2025-06-27 DIAGNOSIS — E11.65 TYPE 2 DIABETES MELLITUS WITH HYPERGLYCEMIA, WITHOUT LONG-TERM CURRENT USE OF INSULIN: Primary | ICD-10-CM

## 2025-06-27 RX ORDER — TIRZEPATIDE 2.5 MG/.5ML
2.5 INJECTION, SOLUTION SUBCUTANEOUS WEEKLY
Qty: 2 ML | Refills: 0 | Status: SHIPPED | OUTPATIENT
Start: 2025-06-27

## 2025-06-27 NOTE — ASSESSMENT & PLAN NOTE
Patient has uncontrolled diabetes with an A1c of 10% (goal <7%).     Patient was prescribed insulin glargine and pioglitazone at her last PCP visit but has not started either medication due to concerns about potential side effects. She shared that during a recent hospitalization, the endocrinologist mentioned insulin would only be needed for approximately three weeks. The patient feels insulin may not be necessary as her fasting glucose levels are in the 160s, and she is concerned about the risk of hypoglycemia. She declined to start insulin despite encouragement. Patient was counseled on appropriate fasting glucose and A1c targets and expressed interest in focusing on diet and lifestyle changes instead.    We also discussed initiating a GLP-1. Although initially hesitant due to her history of IBS, she agreed to start the medication given its once-weekly injection schedule.    Plan:  START Mounjaro 2.5 mg weekly  Counseled patient on MOA, expectations, side effects, duration of therapy, contraindications, administration techniques, and monitoring parameters  Answered all other questions and concerns

## 2025-06-28 LAB
25(OH)D3+25(OH)D2 SERPL-MCNC: 29 NG/ML (ref 30–100)
ALBUMIN SERPL-MCNC: 4.3 G/DL (ref 3.6–5.1)
ALBUMIN/CREAT UR: 8 MG/G CREAT
ALP SERPL-CCNC: 67 U/L (ref 31–125)
ALT SERPL-CCNC: 23 U/L (ref 6–29)
ANION GAP SERPL CALCULATED.4IONS-SCNC: 13 MMOL/L (CALC) (ref 7–17)
AST SERPL-CCNC: 16 U/L (ref 10–30)
BASOPHILS # BLD AUTO: 42 CELLS/UL (ref 0–200)
BASOPHILS NFR BLD AUTO: 0.7 %
BILIRUB SERPL-MCNC: 0.3 MG/DL (ref 0.2–1.2)
BUN SERPL-MCNC: 13 MG/DL (ref 7–25)
CALCIUM SERPL-MCNC: 9.4 MG/DL (ref 8.6–10.2)
CHLORIDE SERPL-SCNC: 104 MMOL/L (ref 98–110)
CO2 SERPL-SCNC: 22 MMOL/L (ref 20–32)
CREAT SERPL-MCNC: 0.71 MG/DL (ref 0.5–0.99)
CREAT UR-MCNC: 199 MG/DL (ref 20–275)
EGFRCR SERPLBLD CKD-EPI 2021: 109 ML/MIN/1.73M2
EOSINOPHIL # BLD AUTO: 120 CELLS/UL (ref 15–500)
EOSINOPHIL NFR BLD AUTO: 2 %
ERYTHROCYTE [DISTWIDTH] IN BLOOD BY AUTOMATED COUNT: 14.1 % (ref 11–15)
FERRITIN SERPL-MCNC: 63 NG/ML (ref 16–232)
FOLATE SERPL-MCNC: 8.6 NG/ML
GLUCOSE SERPL-MCNC: 158 MG/DL (ref 65–99)
HCT VFR BLD AUTO: 41.1 % (ref 35–45)
HGB BLD-MCNC: 13.4 G/DL (ref 11.7–15.5)
IRON SATN MFR SERPL: 26 % (CALC) (ref 16–45)
IRON SERPL-MCNC: 82 MCG/DL (ref 40–190)
LYMPHOCYTES # BLD AUTO: 2784 CELLS/UL (ref 850–3900)
LYMPHOCYTES NFR BLD AUTO: 46.4 %
MCH RBC QN AUTO: 29.5 PG (ref 27–33)
MCHC RBC AUTO-ENTMCNC: 32.6 G/DL (ref 32–36)
MCV RBC AUTO: 90.5 FL (ref 80–100)
MICROALBUMIN UR-MCNC: 1.5 MG/DL
MONOCYTES # BLD AUTO: 288 CELLS/UL (ref 200–950)
MONOCYTES NFR BLD AUTO: 4.8 %
NEUTROPHILS # BLD AUTO: 2766 CELLS/UL (ref 1500–7800)
NEUTROPHILS NFR BLD AUTO: 46.1 %
PLATELET # BLD AUTO: 339 THOUSAND/UL (ref 140–400)
PMV BLD REES-ECKER: 9.7 FL (ref 7.5–12.5)
POTASSIUM SERPL-SCNC: 4.2 MMOL/L (ref 3.5–5.3)
PROT SERPL-MCNC: 6.8 G/DL (ref 6.1–8.1)
RBC # BLD AUTO: 4.54 MILLION/UL (ref 3.8–5.1)
SODIUM SERPL-SCNC: 139 MMOL/L (ref 135–146)
TIBC SERPL-MCNC: 316 MCG/DL (CALC) (ref 250–450)
TSH SERPL-ACNC: 1.81 MIU/L
VIT B12 SERPL-MCNC: 485 PG/ML (ref 200–1100)
WBC # BLD AUTO: 6 THOUSAND/UL (ref 3.8–10.8)

## 2025-06-29 NOTE — PROGRESS NOTES
I reviewed the progress note and agree with the resident’s findings and plans as written. Case discussed with resident.    Piper Gonsalez, PharmD

## 2025-07-02 ENCOUNTER — APPOINTMENT (OUTPATIENT)
Dept: RADIOLOGY | Facility: HOSPITAL | Age: 45
End: 2025-07-02
Payer: MEDICARE

## 2025-07-02 DIAGNOSIS — E11.65 TYPE 2 DIABETES MELLITUS WITH HYPERGLYCEMIA, WITHOUT LONG-TERM CURRENT USE OF INSULIN: Primary | ICD-10-CM

## 2025-07-03 ENCOUNTER — APPOINTMENT (OUTPATIENT)
Dept: OBSTETRICS AND GYNECOLOGY | Facility: CLINIC | Age: 45
End: 2025-07-03
Payer: MEDICARE

## 2025-07-08 ENCOUNTER — HOSPITAL ENCOUNTER (OUTPATIENT)
Dept: RADIOLOGY | Facility: CLINIC | Age: 45
Discharge: HOME | End: 2025-07-08
Payer: MEDICARE

## 2025-07-08 DIAGNOSIS — R10.2 PELVIC PRESSURE IN FEMALE: ICD-10-CM

## 2025-07-08 PROCEDURE — 76856 US EXAM PELVIC COMPLETE: CPT

## 2025-07-08 PROCEDURE — 76856 US EXAM PELVIC COMPLETE: CPT | Performed by: STUDENT IN AN ORGANIZED HEALTH CARE EDUCATION/TRAINING PROGRAM

## 2025-07-08 PROCEDURE — 76830 TRANSVAGINAL US NON-OB: CPT | Performed by: STUDENT IN AN ORGANIZED HEALTH CARE EDUCATION/TRAINING PROGRAM

## 2025-07-09 ENCOUNTER — APPOINTMENT (OUTPATIENT)
Dept: PHARMACY | Facility: HOSPITAL | Age: 45
End: 2025-07-09
Payer: MEDICARE

## 2025-07-09 ASSESSMENT — DERMATOLOGY QUALITY OF LIFE (QOL) ASSESSMENT
WHAT SINGLE SKIN CONDITION LISTED BELOW IS THE PATIENT ANSWERING THE QUALITY-OF-LIFE ASSESSMENT QUESTIONS ABOUT: NONE OF THE ABOVE
RATE HOW BOTHERED YOU ARE BY SYMPTOMS OF YOUR SKIN PROBLEM (EG, ITCHING, STINGING BURNING, HURTING OR SKIN IRRITATION): 6 - ALWAYS BOTHERED
RATE HOW BOTHERED YOU ARE BY EFFECTS OF YOUR SKIN PROBLEMS ON YOUR ACTIVITIES (EG, GOING OUT, ACCOMPLISHING WHAT YOU WANT, WORK ACTIVITIES OR YOUR RELATIONSHIPS WITH OTHERS): 0 - NEVER BOTHERED
RATE HOW BOTHERED YOU ARE BY SYMPTOMS OF YOUR SKIN PROBLEM (EG, ITCHING, STINGING BURNING, HURTING OR SKIN IRRITATION): 6 - ALWAYS BOTHERED
RATE HOW BOTHERED YOU ARE BY EFFECTS OF YOUR SKIN PROBLEMS ON YOUR ACTIVITIES (EG, GOING OUT, ACCOMPLISHING WHAT YOU WANT, WORK ACTIVITIES OR YOUR RELATIONSHIPS WITH OTHERS): 0 - NEVER BOTHERED
RATE HOW EMOTIONALLY BOTHERED YOU ARE BY YOUR SKIN PROBLEM (FOR EXAMPLE, WORRY, EMBARRASSMENT, FRUSTRATION): 1
RATE HOW EMOTIONALLY BOTHERED YOU ARE BY YOUR SKIN PROBLEM (FOR EXAMPLE, WORRY, EMBARRASSMENT, FRUSTRATION): 1
WHAT SINGLE SKIN CONDITION LISTED BELOW IS THE PATIENT ANSWERING THE QUALITY-OF-LIFE ASSESSMENT QUESTIONS ABOUT: NONE OF THE ABOVE

## 2025-07-09 ASSESSMENT — PATIENT GLOBAL ASSESSMENT (PGA): WHAT IS THE PGA: PATIENT GLOBAL ASSESSMENT:  2 - MILD

## 2025-07-10 ENCOUNTER — APPOINTMENT (OUTPATIENT)
Dept: CARE COORDINATION | Facility: CLINIC | Age: 45
End: 2025-07-10
Payer: MEDICARE

## 2025-07-10 PROBLEM — N83.02 FOLLICULAR CYST OF LEFT OVARY: Status: RESOLVED | Noted: 2023-03-22 | Resolved: 2025-07-10

## 2025-07-10 NOTE — RESULT ENCOUNTER NOTE
"The ultrasound shows the uterus appears normal, no fibroids noted.  However the lining of the uterus is somewhat thickened and \"heterogeneous\".  It could be related to a polyp although the thickness of the endometrium may vary depending on your menstrual cycle.  The left and right ovary are normal.  Due to abnormal bleeding and endometrial thickening, I recommend returning to the office for an endometrial biopsy.  I will use a narrow straw to slip through the cervical opening to get a sample.  It may cause mild cramping, I recommend taking ibuprofen or Tylenol one hour before the visit.  Call [phone] to schedule an endometrial biopsy.  "

## 2025-07-10 NOTE — PROGRESS NOTES
INITIAL DIABETES EDUCATION VISIT    Reason for Nutrition Visit:  Pt is a 44 y.o. female being seen Virtual referred for DM    Past Medical Hx:  Problem List[1]     Diabetes related History:  Type of Diabetes: Type 2   What year were you diagnosed with diabetes? 2023  Family History: maternal but unsure   Have you had diabetes education in the past? No  What Concerns you most about having diabetes? Hospitalization and complications     Health Status:  Tobacco: Smoking/Tobacco Use: No, patient does not smoke or use tobacco.  ETOH: Alcohol Use: No, patient does not drink alcohol.    Diabetes Self-Management Skills and Behaviors:   Do you exercise regularly?: Yes. 5+ times/week.  Physical Activity : walking and weight lifting  No. Average amount of hours slept per night: 5-6 hours  How do you manage your diabetes when you are sick?: unsure  Fasting BG range: 140-150mg/dl  Postprandial BG range: not testing      Current Medications:   Medications Ordered Prior to Encounter[2]  **ordered but not started; does not want to take them; hesitant/scared  Monitorng: blood glucose meter: unknown brand  Acute Complications-Safety: carries glucose    Hypoglycemia: None  Hypoglycemia Treatment: reviewed  Symptoms?No    Hyperglycemia: None (most recent was end of May hospitalization)  Hyperglycemia Treatment: reviewed  Symptoms?No    Labs:  Lab Results   Component Value Date    HGBA1C 10 (H) 05/26/2025    HGBA1C 6.7 (H) 08/12/2024    HGBA1C 5.4 07/03/2023     06/27/2025    K 4.2 06/27/2025     06/27/2025    CO2 22 06/27/2025    BUN 13 06/27/2025    CREATININE 0.71 06/27/2025    CALCIUM 9.4 06/27/2025    ALBUMIN 4.3 06/27/2025    PROT 6.8 06/27/2025    BILITOT 0.3 06/27/2025    ALKPHOS 67 06/27/2025    ALT 23 06/27/2025    AST 16 06/27/2025    GLUCOSE 158 (H) 06/27/2025     Lab Results   Component Value Date    CHOL 172 08/12/2024    LDLCALC 93 08/12/2024    TRIG 154 (H) 08/12/2024    HDL 48.4 08/12/2024    LDLF 125 (H)  "07/03/2023      Personal Care/ Health Care:  Has your Doc examined your feet in the last 12 months? Not at this time  How often do you check your feet? Weekly    Have you had a DM eye exam? Yes  Have you seen your dentist in the last 12 months? Yes    Anthropometrics:   Ht Readings from Last 1 Encounters:   06/24/25 1.6 m (5' 3\")      BMI Readings from Last 1 Encounters:   06/24/25 39.86 kg/m²      Wt Readings from Last 10 Encounters:   06/24/25 102 kg (225 lb)   06/09/25 105 kg (231 lb 3.2 oz)   06/04/25 103 kg (228 lb)   05/20/25 107 kg (236 lb 14.4 oz)   05/07/25 107 kg (235 lb)   04/30/25 107 kg (235 lb)   04/22/25 109 kg (240 lb 9.6 oz)   04/11/25 110 kg (241 lb 11.2 oz)   04/09/25 110 kg (243 lb)   02/01/25 110 kg (243 lb 6.4 oz)      Weight change:    Weight Change:   Significant Weight Change: No    Diabetes Assessment:   DM Interferes with other aspects of my life: agree.  My level of stress is high: neutral.  I struggle with making changes in my life: disagree.  How do you handle stress? Comfort food, sweets  What are your current stressors? None reported    DSME - Meal Planning and Diet Recall  Are you currently following any meal plan? Carbohydrate Counting    Does your culture or Quaker require any of the following:   Who does the grocery shopping? patient  Who does the cooking in the home? patient  Food Allergies: Peanuts and Treenuts, strawberries and apples   Intolerance: Lactose   Appetite: Normal     24 Diet Recall:  Meal 1: [place] protein oatmeal or waffles + Greek vanilla yogurt + blueberries   Meal 2: chicken sausage + carrots  Meal 3: sloppy marge or turkey burger or grilled chicken sandwich   Snacks: minimal snacking; maybe popcorn or applesauce  Beverages: water  Eating out:  twice a week (chik wale a, cracker barrel)  Alcohol Intake:  none  Self-Identified Challenges: also has IBS (avoids dairy, onion, high fat/greasy foods)    Visit Summary:           Nutrition Diagnosis:  Diagnosis " "Statement 1:  Diagnosis Status: New  Diagnosis : Altered nutrition related lab values  related to food and nutrition related knowledge deficit concerning appropriate carbohydrate intake as evidenced by elevated plasma glucose and/or HgbA1c levels     Nutrition Interventions:  Provided nutrition education on the following topic(s): Carbohydrate Counting, Exercise, Intuitive Eating, Limited Added Sugars, Plate Method, and Physical Activity using ACONutritionCounseling: Goal Setting  Referred pt to Coordination of Care: None  Discussed with pt? Yes  Provided handouts on: DM Self Mgmt packet; BGL log    DSME Topics  A1c Review  Understanding Diabetes Basics  Learning to [place] with Stress, Depression and Other Concerns  Being Physically Active  Review Glycemic Goals (CGM or SMBG)  Reviewed Hypoglycemia Signs/Symptoms/Tx Plan  Reviewed Hyperglycemia Signs/Symptoms/Tx Plan  MyPlate Method    Nutrition Goals:  Start testing BGL 2-3 times per day, fasting and post-meal.  Walk 30 min 5 times per week.  Limit CHO to 30-45g per meal and 15g per snack.  Refer to snack list for ideas during \"on-the-go\".    Follow up Plan  Will follow-up x 2 wk         [1]   Patient Active Problem List  Diagnosis    Allergic rhinitis    Anxiety and depression    Chronic deep vein thrombosis (DVT) of popliteal vein of left lower extremity    Follicular cyst of left ovary    History of DVT (deep vein thrombosis)    Hypersomnolence    Hypertension    Hypothyroidism    IBS (irritable bowel syndrome)    Iron deficiency anemia    Low vitamin D level    Nightmares associated with chronic post-traumatic stress disorder    Vestibular migraine    Vitamin D deficiency    Obesity (BMI 30.0-34.9)    Mixed hyperlipidemia    Post-concussion syndrome    Concussion with no loss of consciousness, subsequent encounter    Dizziness    Cervicalgia    Acute deep vein thrombosis (DVT) of distal vein of left lower extremity    Anterior tibialis tendinitis    Binge eating "    Chronic diarrhea    Disturbance in sleep behavior    Insulin resistance    Nausea and vomiting    Neuroma of foot    Pain of right shoulder region    Polycystic ovary syndrome    Prediabetes    Radicular pain    Restless legs syndrome    Severe obesity (Multi)    Snoring    Migraine headache    Mild bulimia nervosa    Type 2 diabetes mellitus with hyperglycemia, without long-term current use of insulin   [2]   Current Outpatient Medications on File Prior to Visit   Medication Sig Dispense Refill    albuterol 90 mcg/actuation inhaler Inhale 2 puffs every 6 hours if needed for wheezing. 18 g 1    alcohol swabs pads, medicated To test once daily 100 each 1    atorvastatin (Lipitor) 20 mg tablet Take 1 tablet (20 mg) by mouth once daily.      blood sugar diagnostic (Blood Glucose Test) To test once daily 100 strip 1    cholecalciferol (Vitamin D-3) 50 mcg (2,000 units) tablet TAKE 1 TABLET BY MOUTH ONCE DAILY 90 tablet 0    enoxaparin (Lovenox) 100 mg/mL syringe Inject 1 mL (100 mg) under the skin 2 times a day.      EPINEPHrine 0.3 mg/0.3 mL injection syringe INJECT INTO LATERAL THIGH FOR ANAPHYLAXIS. CALL 911 AFTER USE. MAY REPEAT AFTER 5 MINUTES IF NOT EFFECTIVE. 2 each 0    fenofibrate (Triglide) 160 mg tablet Take 1 tablet (160 mg) by mouth once daily.      insulin glargine (Basaglar KwikPen U-100 Insulin) 100 unit/mL (3 mL) pen Inject 50 Units under the skin once daily at bedtime. Take as directed per insulin instructions.      lancets misc To test once daily 100 each 1    levothyroxine (Synthroid, Levoxyl) 112 mcg tablet Take 1 tablet (112 mcg) by mouth once daily. 90 tablet 1    pioglitazone (Actos) 15 mg tablet Take 1 tablet (15 mg) by mouth once daily.      tirzepatide (Mounjaro) 2.5 mg/0.5 mL pen injector Inject 2.5 mg under the skin 1 (one) time per week. 2 mL 0     No current facility-administered medications on file prior to visit.

## 2025-07-11 ENCOUNTER — APPOINTMENT (OUTPATIENT)
Dept: OBSTETRICS AND GYNECOLOGY | Facility: CLINIC | Age: 45
End: 2025-07-11
Payer: MEDICARE

## 2025-07-11 ENCOUNTER — TELEPHONE (OUTPATIENT)
Facility: CLINIC | Age: 45
End: 2025-07-11

## 2025-07-11 NOTE — TELEPHONE ENCOUNTER
"----- Message from Shanell Collins sent at 7/10/2025  5:32 PM EDT -----  The ultrasound shows the uterus appears normal, no fibroids noted.  However the lining of the uterus is somewhat thickened and \"heterogeneous\".  It could be related to a polyp although the thickness   of the endometrium may vary depending on your menstrual cycle.  The left and right ovary are normal.  Due to abnormal bleeding and endometrial thickening, I recommend returning to the office for an   endometrial biopsy.  I will use a narrow straw to slip through the cervical opening to get a sample.  It may cause mild cramping, I recommend taking ibuprofen or Tylenol one hour before the visit.    Call [phone] to schedule an endometrial biopsy.  ----- Message -----  From: Interface, Radiology Results In  Sent: 7/9/2025   6:42 AM EDT  To: Shanell Collins MD    "

## 2025-07-11 NOTE — PROGRESS NOTES
Clinical Pharmacy Appointment    Patient ID: Tsering Carranza is a 44 y.o. female who presents for No chief complaint on file..    Pt is here for First appointment.     Referring Provider: Mayela Torres DO  PCP: Mayela Torres DO  Last visit with PCP: 6/9/2025   Next visit with PCP: 9/10/2025    Subjective   Medication Reconciliation:  Changed: none  Added: none  Discontinued: none    Drug Interactions  No relevant drug interactions were noted.    Medication System Management  Patient's preferred pharmacy: [facility]  Adherence/Organization: Concerned for adherence; patient has not started a few medications due to concerns for side effects   Affordability/Accessibility: No concerns       Interval History  N/A    HPI  Type II Diabetes  Current  Pharmacotherapy:   Pioglitazone 15 mg daily  Basaglar 50 units nightly      SECONDARY PREVENTION  - Statin? Atorvastatin 20 mg   LDL: 93   TC: 172  - ACE-I/ARB? none   UACR: 0  ALBUMIN/CREATININE RATIO, RANDOM URINE   Date Value Ref Range Status   06/27/2025 8 <30 mg/g creat Final     Comment:        The ADA defines abnormalities in albumin  excretion as follows:     Albuminuria Category        Result (mg/g creatinine)     Normal to Mildly increased   <30  Moderately increased            Severely increased           > OR = 300     The ADA recommends that at least two of three  specimens collected within a 3-6 month period be  abnormal before considering a patient to be  within a diagnostic category.       Albumin/Creatine Ratio   Date Value Ref Range Status   07/03/2023 SEE COMMENT 0.0 - 30.0 ug/mg crt Final     Comment:     One or more analytes used in this calculation   is outside of the analytical measurement range.  Calculation cannot be performed.        BP: 135/83  - Aspirin? none       Current monitoring regimen:   Patient is using: finger sticks, in the morning      SMBG Fasting Readings: 160    Hypoglycemia? None    Pertinent PMH Review:  - PMH of  Pancreatitis: No  - PMH/FH of Medullary Thyroid Cancer: No  - PMH of Retinopathy: No   - PMH of Urinary Tract Infections: n/a    Complications:  - YADIRA: No  - CKD: No    -The 10-year ASCVD risk score (Merry TAO, et al., 2019) is: 1.3%    Values used to calculate the score:      Age: 44 years      Sex: Female      Is Non- : No      Diabetic: Yes      Tobacco smoker: No      Systolic Blood Pressure: 135 mmHg      Is BP treated: No      HDL Cholesterol: 48.4 mg/dL      Total Cholesterol: 172 mg/dL      Diet/Lifestyle:          Objective   Allergies[1]  Social History     Social History Narrative    Not on file      Medication Review  Current Outpatient Medications   Medication Instructions    albuterol 90 mcg/actuation inhaler 2 puffs, inhalation, Every 6 hours PRN    alcohol swabs pads, medicated To test once daily    atorvastatin (LIPITOR) 20 mg, oral, Daily    Basaglar KwikPen U-100 Insulin 50 Units, subcutaneous, Nightly, Take as directed per insulin instructions.    blood sugar diagnostic (Blood Glucose Test) To test once daily    cholecalciferol (VITAMIN D-3) 50 mcg, oral, Daily    enoxaparin (LOVENOX) 100 mg, 2 times daily    EPINEPHrine 0.3 mg/0.3 mL injection syringe INJECT INTO LATERAL THIGH FOR ANAPHYLAXIS. CALL 911 AFTER USE. MAY REPEAT AFTER 5 MINUTES IF NOT EFFECTIVE.    fenofibrate (TRIGLIDE) 160 mg, oral, Daily    lancets misc To test once daily    levothyroxine (SYNTHROID, LEVOXYL) 112 mcg, oral, Daily    Mounjaro 2.5 mg, subcutaneous, Weekly    pioglitazone (ACTOS) 15 mg, oral, Daily      Vitals  BP Readings from Last 2 Encounters:   06/09/25 135/83   06/04/25 124/84     BMI Readings from Last 1 Encounters:   06/24/25 39.86 kg/m²      Labs  A1C  Lab Results   Component Value Date    HGBA1C 10 (H) 05/26/2025    HGBA1C 6.7 (H) 08/12/2024    HGBA1C 5.4 07/03/2023     BMP  Lab Results   Component Value Date    CALCIUM 9.4 06/27/2025     06/27/2025    K 4.2 06/27/2025    CO2 22  06/27/2025     06/27/2025    BUN 13 06/27/2025    CREATININE 0.71 06/27/2025    EGFR 109 06/27/2025     LFTs  Lab Results   Component Value Date    ALT 23 06/27/2025    AST 16 06/27/2025    ALKPHOS 67 06/27/2025    BILITOT 0.3 06/27/2025     FLP  Lab Results   Component Value Date    TRIG 154 (H) 08/12/2024    CHOL 172 08/12/2024    LDLF 125 (H) 07/03/2023    LDLCALC 93 08/12/2024    HDL 48.4 08/12/2024     Urine Microalbumin  Lab Results   Component Value Date    MICROALBCREA 8 06/27/2025     Weight Management  Wt Readings from Last 3 Encounters:   06/24/25 102 kg (225 lb)   06/09/25 105 kg (231 lb 3.2 oz)   06/04/25 103 kg (228 lb)      There is no height or weight on file to calculate BMI.     Assessment/Plan   Problem List Items Addressed This Visit    None      Clinical Pharmacist follow-up: 7/9 @ 3:40, Telehealth visit    Continue all meds under the continuation of care with the referring provider and clinical pharmacy team.    Mor CasarezD  PGY-1 Pharmacy Resident     Verbal consent to manage patient's drug therapy was obtained from the patient. They were informed they may decline to participate or withdraw from participation in pharmacy services at any time.         [1]   Allergies  Allergen Reactions    Azithromycin Dizziness and Other    Bee Pollen Itching    Nut - Unspecified Itching    Pollen Extracts Itching    Prednisone Other    Strawberry Itching    Doxycycline Hives    Duloxetine Other and Unknown     change in mental heart racing elevated BP    Fluoxetine Hcl Anxiety and Unknown     Panic, high anxiety, fatigue    Grass Pollen Itching and Rash    House Dust Itching and Rash

## 2025-07-11 NOTE — TELEPHONE ENCOUNTER
Called patient LVM to return call to schedule EMB per providers recommendations.    English Wooten

## 2025-07-15 ENCOUNTER — APPOINTMENT (OUTPATIENT)
Dept: OPHTHALMOLOGY | Facility: CLINIC | Age: 45
End: 2025-07-15
Payer: MEDICARE

## 2025-07-15 DIAGNOSIS — H35.89 RPE MOTTLING OF MACULA: Primary | ICD-10-CM

## 2025-07-15 DIAGNOSIS — H16.223 BILATERAL KERATOCONJUNCTIVITIS SICCA: ICD-10-CM

## 2025-07-15 DIAGNOSIS — H02.88A MEIBOMIAN GLAND DYSFUNCTION RIGHT EYE, UPPER AND LOWER EYELIDS: ICD-10-CM

## 2025-07-15 DIAGNOSIS — H02.88B MEIBOMIAN GLAND DYSFUNCTION LEFT EYE, UPPER AND LOWER EYELIDS: ICD-10-CM

## 2025-07-15 PROCEDURE — 92134 CPTRZ OPH DX IMG PST SGM RTA: CPT | Performed by: OPTOMETRIST

## 2025-07-15 PROCEDURE — 2023F DILAT RTA XM W/O RTNOPTHY: CPT | Performed by: OPTOMETRIST

## 2025-07-15 PROCEDURE — 99214 OFFICE O/P EST MOD 30 MIN: CPT | Performed by: OPTOMETRIST

## 2025-07-15 ASSESSMENT — ENCOUNTER SYMPTOMS
ENDOCRINE NEGATIVE: 0
EYES NEGATIVE: 0
PSYCHIATRIC NEGATIVE: 0
MUSCULOSKELETAL NEGATIVE: 0
GASTROINTESTINAL NEGATIVE: 0
NEUROLOGICAL NEGATIVE: 0
ALLERGIC/IMMUNOLOGIC NEGATIVE: 0
RESPIRATORY NEGATIVE: 0
CARDIOVASCULAR NEGATIVE: 0
CONSTITUTIONAL NEGATIVE: 0
HEMATOLOGIC/LYMPHATIC NEGATIVE: 0

## 2025-07-15 ASSESSMENT — REFRACTION_CURRENTRX
OD_DIAMETER: 14.1
OS_SPHERE: -4.50
OD_BRAND: DAILIES TOTAL1
OS_DIAMETER: 14.1
OD_BASECURVE: 8.5
OS_BRAND: DAILIES TOTAL1
OD_ADDL_SPECS: TRIAL 3
OD_SPHERE: -4.75
OS_BASECURVE: 8.5

## 2025-07-15 ASSESSMENT — CONF VISUAL FIELD
METHOD: COUNTING FINGERS
OS_NORMAL: 1
OD_INFERIOR_TEMPORAL_RESTRICTION: 0
OS_SUPERIOR_NASAL_RESTRICTION: 0
OD_SUPERIOR_NASAL_RESTRICTION: 0
OD_INFERIOR_NASAL_RESTRICTION: 0
OD_NORMAL: 1
OD_SUPERIOR_TEMPORAL_RESTRICTION: 0
OS_SUPERIOR_TEMPORAL_RESTRICTION: 0
OS_INFERIOR_NASAL_RESTRICTION: 0
OS_INFERIOR_TEMPORAL_RESTRICTION: 0

## 2025-07-15 ASSESSMENT — CUP TO DISC RATIO
OS_RATIO: 0.4
OD_RATIO: 0.4

## 2025-07-15 ASSESSMENT — VISUAL ACUITY
OS_CC: 20/20
OD_CC: 20/20
METHOD: SNELLEN - LINEAR
CORRECTION_TYPE: GLASSES

## 2025-07-15 ASSESSMENT — EXTERNAL EXAM - RIGHT EYE: OD_EXAM: NORMAL

## 2025-07-15 ASSESSMENT — TONOMETRY
OS_IOP_MMHG: 20
OD_IOP_MMHG: 19
IOP_METHOD: GOLDMANN APPLANATION

## 2025-07-15 ASSESSMENT — EXTERNAL EXAM - LEFT EYE: OS_EXAM: NORMAL

## 2025-07-15 ASSESSMENT — SLIT LAMP EXAM - LIDS
COMMENTS: NORMAL
COMMENTS: NORMAL

## 2025-07-15 NOTE — Clinical Note
Both eyes (OU) Contact Lens Order Contact Lens Current Rx    Current Contact Lens Rx (Trial Lens, Ordered)      Brand Base Curve Diameter Sphere Addl. Specs   Right Dailies Total1 8.5 14.1 -4.75   Left Dailies Total1 8.5 14.1 -4.50         Quantity: 4 Package: TRIAL Appointment needed? No Medically necessary? No Ship To: Home Additional instructions: Hello, can you please order some trials and mail to this patient? We do not have this brand at [facility] and she did not like any of the previous brands we tried... Thanks!

## 2025-07-15 NOTE — PROGRESS NOTES
Assessment/Plan   Diagnoses and all orders for this visit:  RPE mottling of macula  -     OCT, Retina - OU - Both Eyes  Stable. Pt reports reduced stress/anxiety as of late. Discussed that pigment mottling may persist but at this time there is no SRF/ME that might be detrimental to vision. Recommend continue to carefully monitor at home w/ AG. RTC ASAP w/ changes on grid or April/May for full exam w/ OCT macula.     Meibomian gland dysfunction right eye, upper and lower eyelids  Meibomian gland dysfunction left eye, upper and lower eyelids  Bilateral keratoconjunctivitis sicca  Pt educated on exam findings. Start level 1 tx of ATs qid OU, gel/silviano lubricants qhs OU, warm compresses for 8-10min with lid massage, and lid hygeine. Discussed that dry eye is a chronic, multi-factorial problem that does require chronic, consistent treatment. RTC prn for follow up with Tear Osmolarity (or Inflammadry), Schirmers B test, OSDI.  Discussed that any CL may make existing dry eye worse.     Trial for Total 1 CL.

## 2025-07-16 ENCOUNTER — APPOINTMENT (OUTPATIENT)
Dept: DERMATOLOGY | Facility: CLINIC | Age: 45
End: 2025-07-16
Payer: MEDICARE

## 2025-07-16 DIAGNOSIS — D23.9 DERMATOFIBROMA: ICD-10-CM

## 2025-07-16 DIAGNOSIS — L85.3 XEROSIS CUTIS: Primary | ICD-10-CM

## 2025-07-16 DIAGNOSIS — R20.9 DISTURBANCE OF SKIN SENSATION: ICD-10-CM

## 2025-07-16 PROCEDURE — 1036F TOBACCO NON-USER: CPT | Performed by: DERMATOLOGY

## 2025-07-16 PROCEDURE — 99214 OFFICE O/P EST MOD 30 MIN: CPT | Performed by: DERMATOLOGY

## 2025-07-16 RX ORDER — LANCETS 33 GAUGE
EACH MISCELLANEOUS
COMMUNITY
Start: 2025-06-22

## 2025-07-16 NOTE — Clinical Note
Patient notices burning sensation of the skin when in the shower. Relates onset to after contracted fifths disease in January but issue persists.  -Discussed disturbance of skin sensation  -Recommend trial of CeraVe itch relief lotion with pramoxine to see if this will help to settle cutaneous nerves  -Short lukewarm showers  -Start emollients if can tolerate    Prior History:  Patient noticed sudden onset of scalp burning while having hair colored/dyed at salon. States that the scalp has been burning and itching in different areas intermittently for 1 week now. States there was never a rash. Patient reports going to the emergency room for this issue, and was given a claritin and discharged.   -There is no evidence of ACD or irritation or chemical burn; healthy normal skin throughout the scalp  -Discussed this is likely nerve etiology  -upon questioning, patient reports numerous neck issues as well as two concussions this year  -Encouraged patient to seek treatment for potential neural underlying etiology  -There is no skin disease  -For temporary relief, may trial Skin Medicinals lidoacine/amitryptilline/pramoxine cream to help calm the disturbance of skin sensation due to nerve irritation

## 2025-07-16 NOTE — Clinical Note
Patient reports dry skin on the face and to a lesser extent on the body as well as sensitivie skin since developing fifths disease in January  -Not using emollients; says they all irritate the skin  -using cetaphil cleanser  -Discussed options for emollients, numerous samples from vanicream line and cicaplast given today for trial on small areas to see if will be tolerated    -Patient requests patch testing. Discussed this is likely to be negative given there is no rash. Patient requests to proceed  -Patch testing process reviewed with the patient today  -Discussed the need for multiple appointments over a one week period  -Discussed the patient will need to keep the patches applied to the skin as instructed, keep the skin cool and dry. Do not get the affected area wet or sweaty as this can inactivate the test.  -There is a potential/theoretical risk of anaphylaxis or severe allergic reaction to occur when performing allergy testing. This is quite rare when testing for skin contact allergy, as this is often a delayed-type, slow onset allergy. However, if the patient develops signs or symptoms of anaphylaxis or systemic allergy, the patient should proceed immediately to the nearest emergency department.  -May proceed with patch testing to  Standard series  -Message sent to patch testing  to please reach out the patient to schedule and complete prior authorization

## 2025-07-16 NOTE — PROGRESS NOTES
Subjective     Tsering Carranza is a 44 y.o. female who presents for the following: other Dry Skin  (Generalized- currently on face. Pt stated first occurred as a rash- rash has since dissipated. Pt stated this started in January. Pt stated sites are flaky and itchy. Pt stated skin is burning after shower. Pt was recommended to do patch test by rheumatologist. Pt denies treatment at this time. ).     Review of Systems:  No other skin or systemic complaints other than what is documented elsewhere in the note.    The following portions of the chart were reviewed this encounter and updated as appropriate:   Tobacco  Allergies  Meds  Problems  Med Hx  Surg Hx  Fam Hx              Objective   Well appearing patient in no apparent distress; mood and affect are within normal limits.    A focused skin examination was performed. All findings within normal limits unless otherwise noted below.    Assessment/Plan   Skin Exam  1. XEROSIS CUTIS  Head - Anterior (Face)  Normal appearing skin on face and scalp  Patient reports dry skin on the face and to a lesser extent on the body as well as sensitivie skin since developing fifths disease in January  -Not using emollients; says they all irritate the skin  -using cetaphil cleanser  -Discussed options for emollients, numerous samples from vanicream line and cicaplast given today for trial on small areas to see if will be tolerated    -Patient requests patch testing. Discussed this is likely to be negative given there is no rash. Patient requests to proceed  -Patch testing process reviewed with the patient today  -Discussed the need for multiple appointments over a one week period  -Discussed the patient will need to keep the patches applied to the skin as instructed, keep the skin cool and dry. Do not get the affected area wet or sweaty as this can inactivate the test.  -There is a potential/theoretical risk of anaphylaxis or severe allergic reaction to occur when  performing allergy testing. This is quite rare when testing for skin contact allergy, as this is often a delayed-type, slow onset allergy. However, if the patient develops signs or symptoms of anaphylaxis or systemic allergy, the patient should proceed immediately to the nearest emergency department.  -May proceed with patch testing to  Standard series  -Message sent to patch testing  to please reach out the patient to schedule and complete prior authorization   2. DISTURBANCE OF SKIN SENSATION  Scalp  Normal skin exam; no erythema, no dermatitis, no scaling, no primary lesions  Patient notices burning sensation of the skin when in the shower. Relates onset to after contracted fifths disease in January but issue persists.  -Discussed disturbance of skin sensation  -Recommend trial of CeraVe itch relief lotion with pramoxine to see if this will help to settle cutaneous nerves  -Short lukewarm showers  -Start emollients if can tolerate    Prior History:  Patient noticed sudden onset of scalp burning while having hair colored/dyed at salon. States that the scalp has been burning and itching in different areas intermittently for 1 week now. States there was never a rash. Patient reports going to the emergency room for this issue, and was given a claritin and discharged.   -There is no evidence of ACD or irritation or chemical burn; healthy normal skin throughout the scalp  -Discussed this is likely nerve etiology  -upon questioning, patient reports numerous neck issues as well as two concussions this year  -Encouraged patient to seek treatment for potential neural underlying etiology  -There is no skin disease  -For temporary relief, may trial Skin Medicinals lidoacine/amitryptilline/pramoxine cream to help calm the disturbance of skin sensation due to nerve irritation  3. DERMATOFIBROMA  Right Upper Arm - Anterior  Firm hyperpigmented papule with positive dimple sign. On dermoscopy, there is a central  scar-like area with a uniform peripheral pigment network.  (lesion of patient's concern)  -Discussed nature of condition  -Reassurance, recommend observation  -Recommend that the patient avoid trauma/injury/manipulation to the area to avoid the lesion enlarging  -Recommend the patient return for re-evaluation if the lesion enlarges, becomes painful or symptomatic, or is changing    Follow up as scheduled for patch testing  Discussed if there are any changes or development of concerning symptoms (lesion/skin condition is changing, bleeding, enlarging, or worsening) the patient is to contact my office. The patient verbalizes understanding.    Miranda Carson MD  7/16/2025

## 2025-07-16 NOTE — Clinical Note
(lesion of patient's concern)  -Discussed nature of condition  -Reassurance, recommend observation  -Recommend that the patient avoid trauma/injury/manipulation to the area to avoid the lesion enlarging  -Recommend the patient return for re-evaluation if the lesion enlarges, becomes painful or symptomatic, or is changing

## 2025-07-18 ENCOUNTER — APPOINTMENT (OUTPATIENT)
Dept: PHARMACY | Facility: HOSPITAL | Age: 45
End: 2025-07-18
Payer: MEDICARE

## 2025-07-21 ENCOUNTER — APPOINTMENT (OUTPATIENT)
Dept: GASTROENTEROLOGY | Facility: CLINIC | Age: 45
End: 2025-07-21
Payer: MEDICARE

## 2025-07-24 ENCOUNTER — APPOINTMENT (OUTPATIENT)
Dept: CARE COORDINATION | Facility: CLINIC | Age: 45
End: 2025-07-24
Payer: MEDICARE

## 2025-07-28 ENCOUNTER — APPOINTMENT (OUTPATIENT)
Dept: ENDOCRINOLOGY | Facility: CLINIC | Age: 45
End: 2025-07-28
Payer: MEDICARE

## 2025-07-28 DIAGNOSIS — Z86.718 HISTORY OF DVT (DEEP VEIN THROMBOSIS): Primary | ICD-10-CM

## 2025-07-28 DIAGNOSIS — I82.4Z2 ACUTE DEEP VEIN THROMBOSIS (DVT) OF DISTAL VEIN OF LEFT LOWER EXTREMITY: ICD-10-CM

## 2025-07-28 RX ORDER — ENOXAPARIN SODIUM 100 MG/ML
100 INJECTION SUBCUTANEOUS 2 TIMES DAILY
Qty: 60 EACH | Refills: 1 | Status: SHIPPED | OUTPATIENT
Start: 2025-07-28

## 2025-07-31 ENCOUNTER — OFFICE VISIT (OUTPATIENT)
Dept: OBSTETRICS AND GYNECOLOGY | Facility: CLINIC | Age: 45
End: 2025-07-31
Payer: MEDICARE

## 2025-07-31 ENCOUNTER — APPOINTMENT (OUTPATIENT)
Dept: OBSTETRICS AND GYNECOLOGY | Facility: CLINIC | Age: 45
End: 2025-07-31
Payer: MEDICARE

## 2025-07-31 VITALS — SYSTOLIC BLOOD PRESSURE: 122 MMHG | BODY MASS INDEX: 39.82 KG/M2 | WEIGHT: 224.8 LBS | DIASTOLIC BLOOD PRESSURE: 80 MMHG

## 2025-07-31 DIAGNOSIS — N89.8 VAGINAL DISCHARGE: ICD-10-CM

## 2025-07-31 DIAGNOSIS — N89.8 VAGINAL IRRITATION: ICD-10-CM

## 2025-07-31 DIAGNOSIS — R10.2 PELVIC PAIN: Primary | ICD-10-CM

## 2025-07-31 PROCEDURE — 3074F SYST BP LT 130 MM HG: CPT | Performed by: OBSTETRICS & GYNECOLOGY

## 2025-07-31 PROCEDURE — 99214 OFFICE O/P EST MOD 30 MIN: CPT | Performed by: OBSTETRICS & GYNECOLOGY

## 2025-07-31 PROCEDURE — 1036F TOBACCO NON-USER: CPT | Performed by: OBSTETRICS & GYNECOLOGY

## 2025-07-31 PROCEDURE — 3079F DIAST BP 80-89 MM HG: CPT | Performed by: OBSTETRICS & GYNECOLOGY

## 2025-07-31 RX ORDER — ESTRADIOL 0.1 MG/G
CREAM VAGINAL
Qty: 42.5 G | Refills: 11 | Status: SHIPPED | OUTPATIENT
Start: 2025-07-31

## 2025-07-31 ASSESSMENT — PAIN SCALES - GENERAL: PAINLEVEL_OUTOF10: 3

## 2025-07-31 NOTE — PROGRESS NOTES
Assessment     PLAN  1. Pelvic pain (Primary)  - reviewed recent imaging. Possible adenomyosis vs pelvic floor discomfort. Will trial pelvic floor PT. Discussed medical vs surgical options for adenomyosis if symptoms do not improve  - POCT urinalysis dipstick manually resulted  - Referral to Physical Therapy; Future    2. Vaginal irritation  - vaginitis swab collected to rule out infection. Exam does not look concerning for infection. Possible slight atrophic changes. Will trial vaginal estrogen. Discussed minimal systemic absorption  - estradiol (Estrace) 0.01 % (0.1 mg/gram) vaginal cream; Apply pea sized amount to vaginal introitus 3 times weekly before bed  Dispense: 42.5 g; Refill: 11    Please return for your next visit in 1 year or sooner as needed.    Remedios Akers MD      Subjective     Tsering Carranza is a 44 y.o. female who is here for a problem visit  PCP = Mayela Torres DO    HPI:   - pelvic discomfort/vaginal discomfort has worsened for past 5 days, on and off. Feels like a squeezing. Daily. Constant. Worse at night. Worse with physical activity. Nothing makes it better. Not associated with periods. Regular BM. No urinary complaints.  Has been going on for close to a year but worse recently.  - c/o vaginal burning. Constant and daily for past 3 days. Always has discharge, not out of the ordinary, maybe more yellow. Slight odor.   - planning to return to Dr. Raymond for EMB.    Gynecologic History:    OBHx:  x 2  Menses: irregular, skips 1-2 months occasionally   Last Pap: NILM/neg HPV in 2022  Sexually active: Not active  STI testing: declines      PMH, PSH, FH, SH, medications and allergies reviewed and edited as needed.      Objective   /80   Wt 102 kg (224 lb 12.8 oz)   LMP 2025 (Approximate)   BMI 39.82 kg/m²      General:   Alert and oriented, in no acute distress   Neck: Supple. No visible thyromegaly.            Vulva: Normal architecture without erythema,  masses, or lesions.    Vagina: Slight erythema around introitus. Slight atrophic changes noted. No abnormal vaginal discharge.    Cervix: Normal without masses, lesions, or signs of cervicitis.    Uterus: Normal mobile, non-enlarged uterus    Adnexa: Normal without masses or lesions   Pelvic Floor No POP noted. No high tone pelvic floor   Psych Normal affect. Normal mood.      Pelvic ultrasound:    FINDINGS:  UTERUS:  The uterus measures   7.7 x  8.4 x  10.6 cm. Normal myometrium      ENDOMETRIUM:  Heterogenous thickened endometrium measuring 2.4 cm.      RIGHT ADNEXA:  The right ovary measures  2.7 x  2.9 x  2.4 and demonstrates normal  flow. No gross right adnexal masses are seen, no hydrosalpinx.      LEFT ADNEXA:  The left ovary measures  2.2 x  3.0 x  2.3 and demonstrates normal  flow. No gross left adnexal masses are seen, no hydrosalpinx.      CUL DE SAC:  No gross free fluid is seen in the pelvic cul-de-sac.      IMPRESSION:  1. No acute pelvic pathology.  2. Heterogenous and thickened endometrial stripe measuring 2.4 cm  without discrete lesions. Advise correlation with LMP and accordingly  further workup to be considered

## 2025-08-01 ENCOUNTER — TELEPHONE (OUTPATIENT)
Dept: OBSTETRICS AND GYNECOLOGY | Facility: CLINIC | Age: 45
End: 2025-08-01

## 2025-08-01 ENCOUNTER — HOSPITAL ENCOUNTER (OUTPATIENT)
Dept: RADIOLOGY | Facility: CLINIC | Age: 45
Discharge: HOME | End: 2025-08-01
Payer: MEDICARE

## 2025-08-01 DIAGNOSIS — B96.89 BV (BACTERIAL VAGINOSIS): Primary | ICD-10-CM

## 2025-08-01 DIAGNOSIS — N20.0 STONE IN KIDNEY: ICD-10-CM

## 2025-08-01 DIAGNOSIS — N76.0 BV (BACTERIAL VAGINOSIS): Primary | ICD-10-CM

## 2025-08-01 LAB — BV SCORE VAG QL: NORMAL

## 2025-08-01 PROCEDURE — 76770 US EXAM ABDO BACK WALL COMP: CPT

## 2025-08-01 RX ORDER — METRONIDAZOLE 7.5 MG/G
GEL VAGINAL DAILY
Qty: 70 G | Refills: 0 | Status: SHIPPED | OUTPATIENT
Start: 2025-08-01 | End: 2025-08-06

## 2025-08-08 ENCOUNTER — APPOINTMENT (OUTPATIENT)
Dept: CARE COORDINATION | Facility: CLINIC | Age: 45
End: 2025-08-08
Payer: MEDICARE

## 2025-08-20 ENCOUNTER — APPOINTMENT (OUTPATIENT)
Dept: OBSTETRICS AND GYNECOLOGY | Facility: CLINIC | Age: 45
End: 2025-08-20
Payer: MEDICARE

## 2025-08-20 ENCOUNTER — DOCUMENTATION (OUTPATIENT)
Dept: OPHTHALMOLOGY | Facility: CLINIC | Age: 45
End: 2025-08-20
Payer: MEDICARE

## 2025-08-20 ASSESSMENT — REFRACTION_CURRENTRX
OD_ADDL_SPECS: TRIAL 3
OD_DIAMETER: 14.1
OS_BRAND: DAILIES TOTAL1
OD_BRAND: DAILIES TOTAL1
OD_BASECURVE: 8.5
OS_BASECURVE: 8.5
OD_SPHERE: -4.75
OS_SPHERE: -4.50
OS_DIAMETER: 14.1

## 2025-08-21 ENCOUNTER — TELEPHONE (OUTPATIENT)
Dept: OPHTHALMOLOGY | Facility: CLINIC | Age: 45
End: 2025-08-21
Payer: MEDICARE

## 2025-08-27 ENCOUNTER — APPOINTMENT (OUTPATIENT)
Dept: OBSTETRICS AND GYNECOLOGY | Facility: CLINIC | Age: 45
End: 2025-08-27
Payer: MEDICARE

## 2025-08-27 ASSESSMENT — PAIN SCALES - GENERAL: PAINLEVEL_OUTOF10: 3

## 2025-08-27 ASSESSMENT — PATIENT HEALTH QUESTIONNAIRE - PHQ9
8. MOVING OR SPEAKING SO SLOWLY THAT OTHER PEOPLE COULD HAVE NOTICED. OR THE OPPOSITE, BEING SO FIGETY OR RESTLESS THAT YOU HAVE BEEN MOVING AROUND A LOT MORE THAN USUAL: SEVERAL DAYS
2. FEELING DOWN, DEPRESSED OR HOPELESS: SEVERAL DAYS
3. TROUBLE FALLING OR STAYING ASLEEP OR SLEEPING TOO MUCH: MORE THAN HALF THE DAYS
6. FEELING BAD ABOUT YOURSELF - OR THAT YOU ARE A FAILURE OR HAVE LET YOURSELF OR YOUR FAMILY DOWN: SEVERAL DAYS
5. POOR APPETITE OR OVEREATING: SEVERAL DAYS
9. THOUGHTS THAT YOU WOULD BE BETTER OFF DEAD, OR OF HURTING YOURSELF: NOT AT ALL
4. FEELING TIRED OR HAVING LITTLE ENERGY: NEARLY EVERY DAY
SUM OF ALL RESPONSES TO PHQ9 QUESTIONS 1 AND 2: 2
7. TROUBLE CONCENTRATING ON THINGS, SUCH AS READING THE NEWSPAPER OR WATCHING TELEVISION: NEARLY EVERY DAY
SUM OF ALL RESPONSES TO PHQ QUESTIONS 1-9: 13
1. LITTLE INTEREST OR PLEASURE IN DOING THINGS: SEVERAL DAYS

## 2025-08-27 ASSESSMENT — ENCOUNTER SYMPTOMS
DEPRESSION: 1
LOSS OF SENSATION IN FEET: 0
OCCASIONAL FEELINGS OF UNSTEADINESS: 0

## 2025-08-28 ENCOUNTER — APPOINTMENT (OUTPATIENT)
Dept: ALLERGY | Facility: CLINIC | Age: 45
End: 2025-08-28
Payer: MEDICARE

## 2025-08-28 ENCOUNTER — TELEMEDICINE CLINICAL SUPPORT (OUTPATIENT)
Dept: CARE COORDINATION | Facility: CLINIC | Age: 45
End: 2025-08-28
Payer: MEDICARE

## 2025-09-02 ENCOUNTER — APPOINTMENT (OUTPATIENT)
Dept: UROLOGY | Facility: CLINIC | Age: 45
End: 2025-09-02
Payer: MEDICARE

## 2025-09-04 ENCOUNTER — APPOINTMENT (OUTPATIENT)
Facility: CLINIC | Age: 45
End: 2025-09-04
Payer: MEDICARE

## 2025-09-05 ENCOUNTER — TELEMEDICINE (OUTPATIENT)
Dept: UROLOGY | Facility: CLINIC | Age: 45
End: 2025-09-05
Payer: MEDICARE

## 2025-09-05 DIAGNOSIS — N20.0 STONE IN KIDNEY: ICD-10-CM

## 2025-09-05 PROCEDURE — 99205 OFFICE O/P NEW HI 60 MIN: CPT

## 2025-09-10 ENCOUNTER — APPOINTMENT (OUTPATIENT)
Dept: DERMATOLOGY | Facility: CLINIC | Age: 45
End: 2025-09-10
Payer: MEDICARE

## 2025-09-12 ENCOUNTER — APPOINTMENT (OUTPATIENT)
Dept: CARE COORDINATION | Facility: CLINIC | Age: 45
End: 2025-09-12
Payer: MEDICARE

## 2025-10-03 ENCOUNTER — APPOINTMENT (OUTPATIENT)
Dept: RADIOLOGY | Facility: CLINIC | Age: 45
End: 2025-10-03
Payer: MEDICARE

## 2025-10-06 ENCOUNTER — APPOINTMENT (OUTPATIENT)
Dept: GASTROENTEROLOGY | Facility: CLINIC | Age: 45
End: 2025-10-06
Payer: MEDICARE

## 2025-10-08 ENCOUNTER — APPOINTMENT (OUTPATIENT)
Dept: UROLOGY | Facility: CLINIC | Age: 45
End: 2025-10-08
Payer: MEDICARE

## 2025-10-10 ENCOUNTER — APPOINTMENT (OUTPATIENT)
Dept: UROLOGY | Facility: CLINIC | Age: 45
End: 2025-10-10
Payer: MEDICARE

## 2025-10-13 ENCOUNTER — APPOINTMENT (OUTPATIENT)
Dept: CARDIOLOGY | Facility: CLINIC | Age: 45
End: 2025-10-13
Payer: MEDICARE

## 2025-10-24 ENCOUNTER — APPOINTMENT (OUTPATIENT)
Dept: PRIMARY CARE | Facility: HOSPITAL | Age: 45
End: 2025-10-24
Payer: MEDICARE

## 2025-10-27 ENCOUNTER — APPOINTMENT (OUTPATIENT)
Dept: DERMATOLOGY | Facility: CLINIC | Age: 45
End: 2025-10-27
Payer: MEDICARE

## 2025-10-28 ENCOUNTER — APPOINTMENT (OUTPATIENT)
Dept: DERMATOLOGY | Facility: CLINIC | Age: 45
End: 2025-10-28
Payer: MEDICARE

## 2025-10-30 ENCOUNTER — APPOINTMENT (OUTPATIENT)
Dept: ENDOCRINOLOGY | Facility: CLINIC | Age: 45
End: 2025-10-30
Payer: MEDICARE

## 2025-11-05 ENCOUNTER — APPOINTMENT (OUTPATIENT)
Dept: ENDOCRINOLOGY | Facility: CLINIC | Age: 45
End: 2025-11-05
Payer: MEDICARE

## 2025-11-17 ENCOUNTER — APPOINTMENT (OUTPATIENT)
Dept: CARDIOLOGY | Facility: CLINIC | Age: 45
End: 2025-11-17
Payer: MEDICARE

## 2025-11-21 ENCOUNTER — APPOINTMENT (OUTPATIENT)
Facility: CLINIC | Age: 45
End: 2025-11-21
Payer: MEDICARE

## 2025-12-11 ENCOUNTER — APPOINTMENT (OUTPATIENT)
Dept: UROLOGY | Facility: CLINIC | Age: 45
End: 2025-12-11
Payer: MEDICARE

## 2026-04-13 ENCOUNTER — APPOINTMENT (OUTPATIENT)
Dept: OPHTHALMOLOGY | Facility: CLINIC | Age: 46
End: 2026-04-13
Payer: MEDICARE

## 2026-04-14 ENCOUNTER — APPOINTMENT (OUTPATIENT)
Dept: OPHTHALMOLOGY | Facility: CLINIC | Age: 46
End: 2026-04-14
Payer: MEDICARE

## 2026-05-15 ENCOUNTER — APPOINTMENT (OUTPATIENT)
Dept: RADIOLOGY | Facility: CLINIC | Age: 46
End: 2026-05-15
Payer: MEDICARE

## 2026-05-15 DIAGNOSIS — Z12.31 SCREENING MAMMOGRAM FOR BREAST CANCER: ICD-10-CM
